# Patient Record
Sex: MALE | Race: WHITE | NOT HISPANIC OR LATINO | Employment: OTHER | URBAN - METROPOLITAN AREA
[De-identification: names, ages, dates, MRNs, and addresses within clinical notes are randomized per-mention and may not be internally consistent; named-entity substitution may affect disease eponyms.]

---

## 2018-01-15 NOTE — MISCELLANEOUS
Dear  Mr Salazarning Issac:      We missed you for your originally scheduled neurological followup appointment with Dr Saman Porras    Please call at your earliest convenience to reschedule this appointment  Sincerely,     Verenice Miranda      Extension  [phone]           Electronically signed Tim Nino   Feb 23 2016  3:02PM EST Author

## 2019-10-04 ENCOUNTER — TELEPHONE (OUTPATIENT)
Dept: UROLOGY | Facility: CLINIC | Age: 84
End: 2019-10-04

## 2019-10-04 NOTE — TELEPHONE ENCOUNTER
Received [facility] referral     Called pt and left voicemail for patient to call back and make appt - closest office is [place]  Referral is entered and scanned in [facility]      Records were faxed to [facility]

## 2021-01-01 ENCOUNTER — NURSING HOME VISIT (OUTPATIENT)
Dept: GERIATRICS | Facility: OTHER | Age: 86
End: 2021-01-01
Payer: MEDICARE

## 2021-01-01 VITALS
RESPIRATION RATE: 18 BRPM | SYSTOLIC BLOOD PRESSURE: 138 MMHG | HEART RATE: 67 BPM | WEIGHT: 162 LBS | DIASTOLIC BLOOD PRESSURE: 61 MMHG | OXYGEN SATURATION: 96 % | TEMPERATURE: 96.8 F | BODY MASS INDEX: 26.96 KG/M2

## 2021-01-01 VITALS
HEART RATE: 70 BPM | WEIGHT: 162 LBS | DIASTOLIC BLOOD PRESSURE: 55 MMHG | TEMPERATURE: 97.3 F | BODY MASS INDEX: 26.96 KG/M2 | OXYGEN SATURATION: 97 % | RESPIRATION RATE: 18 BRPM | SYSTOLIC BLOOD PRESSURE: 111 MMHG

## 2021-01-01 DIAGNOSIS — K86.9 PANCREATIC LESION: ICD-10-CM

## 2021-01-01 DIAGNOSIS — N18.32 STAGE 3B CHRONIC KIDNEY DISEASE (HCC): ICD-10-CM

## 2021-01-01 DIAGNOSIS — R26.2 AMBULATORY DYSFUNCTION: ICD-10-CM

## 2021-01-01 DIAGNOSIS — G70.00 MG (MYASTHENIA GRAVIS) (HCC): ICD-10-CM

## 2021-01-01 DIAGNOSIS — N40.0 BENIGN PROSTATIC HYPERPLASIA, UNSPECIFIED WHETHER LOWER URINARY TRACT SYMPTOMS PRESENT: ICD-10-CM

## 2021-01-01 DIAGNOSIS — G70.00 MG (MYASTHENIA GRAVIS) (HCC): Primary | ICD-10-CM

## 2021-01-01 DIAGNOSIS — K25.3 ACUTE ULCER OF PYLORIC ANTRUM: Primary | ICD-10-CM

## 2021-01-01 PROCEDURE — 99306 1ST NF CARE HIGH MDM 50: CPT | Performed by: FAMILY MEDICINE

## 2021-01-01 PROCEDURE — 99309 SBSQ NF CARE MODERATE MDM 30: CPT | Performed by: NURSE PRACTITIONER

## 2021-01-01 RX ORDER — PANTOPRAZOLE SODIUM 40 MG/1
40 TABLET, DELAYED RELEASE ORAL 2 TIMES DAILY
COMMUNITY
End: 2022-01-01 | Stop reason: HOSPADM

## 2021-01-01 RX ORDER — OXYBUTYNIN CHLORIDE 5 MG/1
1 TABLET, EXTENDED RELEASE ORAL DAILY
COMMUNITY
End: 2022-01-01 | Stop reason: ALTCHOICE

## 2021-01-01 RX ORDER — SUCRALFATE 1 G/1
1 TABLET ORAL 4 TIMES DAILY
COMMUNITY
End: 2022-01-01 | Stop reason: HOSPADM

## 2021-01-01 RX ORDER — GABAPENTIN 300 MG/1
300 CAPSULE ORAL
COMMUNITY
End: 2022-01-01 | Stop reason: HOSPADM

## 2021-01-01 RX ORDER — DOXEPIN HYDROCHLORIDE 10 MG/1
10 CAPSULE ORAL AS NEEDED
COMMUNITY
End: 2022-01-01 | Stop reason: ALTCHOICE

## 2021-01-01 RX ORDER — TERAZOSIN 10 MG/1
5 CAPSULE ORAL
COMMUNITY
End: 2022-01-01 | Stop reason: HOSPADM

## 2021-01-01 RX ORDER — FOLIC ACID 1 MG/1
TABLET ORAL DAILY
COMMUNITY
End: 2022-01-01 | Stop reason: ALTCHOICE

## 2021-01-01 RX ORDER — LOVASTATIN 40 MG/1
1 TABLET ORAL DAILY
COMMUNITY
End: 2022-01-01 | Stop reason: ALTCHOICE

## 2021-12-22 PROBLEM — G70.00 MG (MYASTHENIA GRAVIS) (HCC): Status: ACTIVE | Noted: 2021-01-01

## 2021-12-22 PROBLEM — K25.9 ANTRAL ULCER: Status: ACTIVE | Noted: 2021-01-01

## 2021-12-22 PROBLEM — R26.2 AMBULATORY DYSFUNCTION: Status: ACTIVE | Noted: 2021-01-01

## 2021-12-22 PROBLEM — H91.90 HEARING LOSS: Status: ACTIVE | Noted: 2021-01-01

## 2021-12-22 PROBLEM — N18.30 STAGE 3 CHRONIC KIDNEY DISEASE (HCC): Status: ACTIVE | Noted: 2021-01-01

## 2021-12-22 PROBLEM — R53.81 PHYSICAL DECONDITIONING: Status: ACTIVE | Noted: 2021-01-01

## 2021-12-22 PROBLEM — K86.9 PANCREATIC LESION: Status: ACTIVE | Noted: 2021-01-01

## 2021-12-30 NOTE — PROGRESS NOTES
[facility]  [address]ak[address]  [phone]  [place]  Code 31 (STR)    NAME: Carley Garcia  AGE: 81 y o  SEX: male CODE STATUS: DNR/DNI    DATE OF ENCOUNTER: 12/30/2021    Assessment and Plan     1  MG (myasthenia gravis) ([facility] [address] [address] )  Assessment & Plan:  · Follows with Neurology as OP  · Received IVIG cycles  · Continue PT/OT at [facility]  · Fall Precautions       2  Benign prostatic hyperplasia, unspecified whether lower urinary tract symptoms present  Assessment & Plan:  · Denies symptoms on exam   · Continue Oxybutynin and Terazosin      3  Stage 3b chronic kidney disease ([facility] [address] )  Assessment & Plan:  · JUDITH while inpatient - received gentle IVF  · BUN/Crt 42/1 67- stable  · Monitor with PCP at d/c from [facility]  · Avoid hypotension  · Avoid NSAIDS  · Renal dose medications       4  Ambulatory dysfunction  Assessment & Plan:  · Patient states he still feels weak  · Good Bed mobility- having difficulty with transfers and ambulation  · Continue PT/OT  · Fall Precautions  ·  following for d/c plan- potential LTC here vs Family to take home          All medications and routine orders were reviewed and updated as needed  Chief Complaint     STR follow up visit    History of Present Illness     Nette Xiao is a 81year old male admitted to [address] [place] [address] on 12/21/2021 for STR  Past Medical Hx including but not limited to Myastenia Gravis, BPH, Prostate CA, Neuropathy, ambulatory dysfunction seen in collaboration with nursing for medical mgmt and STR follow up  Presented to [facility] with c/o 2 week worsening weakness and loss of appetite  He developed coffee ground emesis- s/p EDG showing antral ulceration- started on PPI and carafate  Patient had JUDITH s/p gentle IVF with improvement  CT abdomen revealed a concerning pancreatic mass and an MRI was done to reveal 5 mm pancreatic cystic lesion- no change from prior imaging per records       Seen on exam today, he is sitting on edge of bed eating chocolate cake for breakfast  Patient states his appetite is returning  Patient denies any pain on exam  Patient is [place] but able to provide a reliable history  Patient states he still feels weak and is "thankful" for therapy but is requesting to go home  Patient denies any bowel or bladder issues  Patient denies CP/SOB/N/V/D  Denies lightheadedness, dizziness, headaches, vision changes  Per review of SNF records, Patient is eating 1-3 meals per day, consuming  %  Last documented BM 12/30/21 x 2  Patient is actively participating in therapy  No concerns from nursing at this time  The patient's allergies, past medical, surgical, social and family history were reviewed and unchanged  Review of Systems     Review of Systems   Constitutional: Positive for activity change  Negative for appetite change, fatigue and fever  HENT: Positive for hearing loss  Negative for ear pain, rhinorrhea and trouble swallowing  Eyes: Negative for pain and visual disturbance  Respiratory: Negative for cough, shortness of breath and wheezing  Cardiovascular: Negative for chest pain and palpitations  Gastrointestinal: Negative for abdominal distention, abdominal pain, blood in stool, constipation, diarrhea, nausea and vomiting  Genitourinary: Negative for decreased urine volume, difficulty urinating, dysuria, frequency and hematuria  Musculoskeletal: Positive for gait problem  Negative for arthralgias and back pain  Skin: Negative for color change and rash  Neurological: Positive for weakness  Negative for dizziness, syncope, light-headedness, numbness and headaches  Psychiatric/Behavioral: Negative for dysphoric mood and sleep disturbance  Objective     Vitals:   Vitals:    12/30/21 0523   BP: 138/61   Pulse: 67   Resp: 18   Temp: (!) 96 8 °F (36 °C)   SpO2: 96%         Physical Exam  Vitals and nursing note reviewed     Constitutional:       General: He is not in acute distress  Appearance: Normal appearance  HENT:      Head: Normocephalic and atraumatic  Ears:      Comments: Hard of Hearing      Nose: No congestion or rhinorrhea  Mouth/Throat:      Mouth: Mucous membranes are moist    Eyes:      General: No scleral icterus  Extraocular Movements: Extraocular movements intact  Conjunctiva/sclera: Conjunctivae normal       Pupils: Pupils are equal, round, and reactive to light  Cardiovascular:      Rate and Rhythm: Normal rate and regular rhythm  Pulses: Normal pulses  Heart sounds: Normal heart sounds  No murmur heard  Pulmonary:      Effort: Pulmonary effort is normal       Breath sounds: Normal breath sounds  No wheezing, rhonchi or rales  Abdominal:      General: Bowel sounds are normal  There is no distension  Palpations: Abdomen is soft  Tenderness: There is no abdominal tenderness  Musculoskeletal:         General: No swelling or signs of injury  Lymphadenopathy:      Cervical: No cervical adenopathy  Skin:     General: Skin is warm and dry  Findings: No erythema  Neurological:      Mental Status: He is alert and oriented to person, place, and time  Mental status is at baseline  Sensory: No sensory deficit  Motor: Weakness present  Gait: Gait abnormal       Comments: AAOx3 - forgetful about names of medications, "foggy" on details of recent events    Psychiatric:         Mood and Affect: Mood normal          Behavior: Behavior normal          Pertinent Laboratory/Diagnostic Studies:   Reviewed in facility chart-stable    Current Medications   Medications reviewed and updated see facility [facility] for details        Current Outpatient Medications:     Aspirin 81 MG CAPS, Take by mouth, Disp: , Rfl:     doxepin (SINEquan) 10 mg capsule, Take 10 mg by mouth as needed for insomnia, Disp: , Rfl:     folic acid (FOLVITE) 1 mg tablet, Take by mouth daily, Disp: , Rfl:     gabapentin (NEURONTIN) 300 mg capsule, Take 300 mg by mouth daily at bedtime, Disp: , Rfl:     lovastatin (MEVACOR) 40 MG tablet, Take 1 tablet by mouth daily, Disp: , Rfl:     oxybutynin (DITROPAN-XL) 5 mg 24 hr tablet, Take 1 tablet by mouth daily, Disp: , Rfl:     pantoprazole (PROTONIX) 40 mg tablet, Take 40 mg by mouth 2 (two) times a day, Disp: , Rfl:     sucralfate (CARAFATE) 1 g tablet, Take 1 g by mouth 4 (four) times a day, Disp: , Rfl:     terazosin (HYTRIN) 10 MG capsule, Take 10 mg by mouth daily at bedtime, Disp: , Rfl:      Plan discussed with Dr Analisa Lopez noted agreement with assessment and plan  Please note:  Voice-recognition software may have been used in the preparation of this document  Occasional wrong word or "sound-alike" substitutions may have occurred due to the inherent limitations of voice recognition software  Interpretation should be guided by JOSE Gamboa  12/30/2021  5:22 AM

## 2022-01-01 ENCOUNTER — NURSING HOME VISIT (OUTPATIENT)
Dept: GERIATRICS | Facility: OTHER | Age: 87
End: 2022-01-01
Payer: COMMERCIAL

## 2022-01-01 ENCOUNTER — TELEPHONE (OUTPATIENT)
Dept: SURGICAL ONCOLOGY | Facility: CLINIC | Age: 87
End: 2022-01-01

## 2022-01-01 ENCOUNTER — DOCUMENTATION (OUTPATIENT)
Dept: OTHER | Facility: HOSPITAL | Age: 87
End: 2022-01-01

## 2022-01-01 ENCOUNTER — TELEPHONE (OUTPATIENT)
Dept: NEUROLOGY | Facility: CLINIC | Age: 87
End: 2022-01-01

## 2022-01-01 ENCOUNTER — HOSPITAL ENCOUNTER (INPATIENT)
Facility: HOSPITAL | Age: 87
LOS: 8 days | Discharge: HOSPICE | DRG: 871 | End: 2022-03-16
Attending: EMERGENCY MEDICINE | Admitting: INTERNAL MEDICINE
Payer: COMMERCIAL

## 2022-01-01 ENCOUNTER — APPOINTMENT (EMERGENCY)
Dept: CT IMAGING | Facility: HOSPITAL | Age: 87
DRG: 871 | End: 2022-01-01
Payer: COMMERCIAL

## 2022-01-01 ENCOUNTER — TELEPHONE (OUTPATIENT)
Dept: OTHER | Facility: OTHER | Age: 87
End: 2022-01-01

## 2022-01-01 ENCOUNTER — OFFICE VISIT (OUTPATIENT)
Dept: NEUROLOGY | Facility: CLINIC | Age: 87
End: 2022-01-01
Payer: COMMERCIAL

## 2022-01-01 ENCOUNTER — TELEPHONE (OUTPATIENT)
Dept: HEMATOLOGY ONCOLOGY | Facility: CLINIC | Age: 87
End: 2022-01-01

## 2022-01-01 ENCOUNTER — NURSING HOME VISIT (OUTPATIENT)
Dept: WOUND CARE | Facility: HOSPITAL | Age: 87
End: 2022-01-01
Payer: COMMERCIAL

## 2022-01-01 VITALS — BODY MASS INDEX: 25.68 KG/M2 | WEIGHT: 154.3 LBS

## 2022-01-01 VITALS
OXYGEN SATURATION: 93 % | RESPIRATION RATE: 30 BRPM | HEIGHT: 65 IN | HEART RATE: 74 BPM | BODY MASS INDEX: 28.06 KG/M2 | TEMPERATURE: 99.1 F | WEIGHT: 168.43 LBS | SYSTOLIC BLOOD PRESSURE: 129 MMHG | DIASTOLIC BLOOD PRESSURE: 59 MMHG

## 2022-01-01 VITALS
HEART RATE: 74 BPM | DIASTOLIC BLOOD PRESSURE: 72 MMHG | TEMPERATURE: 95.9 F | OXYGEN SATURATION: 96 % | RESPIRATION RATE: 16 BRPM | SYSTOLIC BLOOD PRESSURE: 142 MMHG

## 2022-01-01 VITALS
TEMPERATURE: 98.2 F | BODY MASS INDEX: 25.68 KG/M2 | SYSTOLIC BLOOD PRESSURE: 94 MMHG | HEART RATE: 57 BPM | RESPIRATION RATE: 20 BRPM | OXYGEN SATURATION: 96 % | WEIGHT: 154.3 LBS | DIASTOLIC BLOOD PRESSURE: 55 MMHG

## 2022-01-01 DIAGNOSIS — J41.1 MUCOPURULENT CHRONIC BRONCHITIS (HCC): ICD-10-CM

## 2022-01-01 DIAGNOSIS — N40.0 BENIGN PROSTATIC HYPERPLASIA, UNSPECIFIED WHETHER LOWER URINARY TRACT SYMPTOMS PRESENT: ICD-10-CM

## 2022-01-01 DIAGNOSIS — G93.40 ENCEPHALOPATHY: Primary | ICD-10-CM

## 2022-01-01 DIAGNOSIS — G70.00 MG (MYASTHENIA GRAVIS) (HCC): ICD-10-CM

## 2022-01-01 DIAGNOSIS — G70.00 MG (MYASTHENIA GRAVIS) (HCC): Primary | ICD-10-CM

## 2022-01-01 DIAGNOSIS — T80.1XXA INTRAVENOUS INFILTRATION, INITIAL ENCOUNTER: Primary | ICD-10-CM

## 2022-01-01 DIAGNOSIS — N40.0 BENIGN PROSTATIC HYPERPLASIA, UNSPECIFIED WHETHER LOWER URINARY TRACT SYMPTOMS PRESENT: Primary | ICD-10-CM

## 2022-01-01 DIAGNOSIS — C61 MALIGNANT NEOPLASM OF PROSTATE (HCC): ICD-10-CM

## 2022-01-01 DIAGNOSIS — R54 FRAILTY SYNDROME IN GERIATRIC PATIENT: ICD-10-CM

## 2022-01-01 DIAGNOSIS — N18.32 STAGE 3B CHRONIC KIDNEY DISEASE (HCC): ICD-10-CM

## 2022-01-01 DIAGNOSIS — K86.9 PANCREATIC LESION: ICD-10-CM

## 2022-01-01 DIAGNOSIS — R26.2 AMBULATORY DYSFUNCTION: ICD-10-CM

## 2022-01-01 DIAGNOSIS — K25.3 ACUTE ULCER OF PYLORIC ANTRUM: Primary | ICD-10-CM

## 2022-01-01 DIAGNOSIS — E16.2 HYPOGLYCEMIA: ICD-10-CM

## 2022-01-01 LAB
2HR DELTA HS TROPONIN: -2 NG/L
4HR DELTA HS TROPONIN: 2 NG/L
ABO GROUP BLD: NORMAL
ACETOHEXAMIDE SERPL-MCNC: NEGATIVE UG/ML (ref 20–60)
ACHR BIND AB SER-SCNC: 7.21 NMOL/L (ref 0–0.24)
ACHR BLOCK AB/ACHR TOTAL SFR SER: 46 % (ref 0–25)
ALBUMIN SERPL BCP-MCNC: 1.4 G/DL (ref 3.5–5)
ALBUMIN SERPL BCP-MCNC: 1.6 G/DL (ref 3.5–5)
ALBUMIN SERPL BCP-MCNC: 2 G/DL (ref 3.5–5)
ALBUMIN SERPL BCP-MCNC: 2.6 G/DL (ref 3.5–5)
ALP SERPL-CCNC: 114 U/L (ref 46–116)
ALP SERPL-CCNC: 116 U/L (ref 46–116)
ALP SERPL-CCNC: 117 U/L (ref 46–116)
ALP SERPL-CCNC: 95 U/L (ref 46–116)
ALT SERPL W P-5'-P-CCNC: 49 U/L (ref 12–78)
ALT SERPL W P-5'-P-CCNC: 65 U/L (ref 12–78)
ALT SERPL W P-5'-P-CCNC: 69 U/L (ref 12–78)
ALT SERPL W P-5'-P-CCNC: 70 U/L (ref 12–78)
AMMONIA PLAS-SCNC: 16 UMOL/L (ref 11–35)
AMPHETAMINES SERPL QL SCN: NEGATIVE
ANION GAP SERPL CALCULATED.3IONS-SCNC: 10 MMOL/L (ref 4–13)
ANION GAP SERPL CALCULATED.3IONS-SCNC: 10 MMOL/L (ref 4–13)
ANION GAP SERPL CALCULATED.3IONS-SCNC: 11 MMOL/L (ref 4–13)
ANION GAP SERPL CALCULATED.3IONS-SCNC: 11 MMOL/L (ref 4–13)
ANION GAP SERPL CALCULATED.3IONS-SCNC: 12 MMOL/L (ref 4–13)
ANION GAP SERPL CALCULATED.3IONS-SCNC: 12 MMOL/L (ref 4–13)
ANION GAP SERPL CALCULATED.3IONS-SCNC: 14 MMOL/L (ref 4–13)
ANION GAP SERPL CALCULATED.3IONS-SCNC: 14 MMOL/L (ref 4–13)
ANION GAP SERPL CALCULATED.3IONS-SCNC: 9 MMOL/L (ref 4–13)
APAP SERPL-MCNC: <2 UG/ML (ref 10–20)
APTT PPP: 55 SECONDS (ref 23–37)
AST SERPL W P-5'-P-CCNC: 42 U/L (ref 5–45)
AST SERPL W P-5'-P-CCNC: 55 U/L (ref 5–45)
AST SERPL W P-5'-P-CCNC: 62 U/L (ref 5–45)
AST SERPL W P-5'-P-CCNC: 63 U/L (ref 5–45)
ATRIAL RATE: 0 BPM
ATRIAL RATE: 150 BPM
ATRIAL RATE: 441 BPM
ATRIAL RATE: 80 BPM
ATRIAL RATE: 86 BPM
ATRIAL RATE: 99 BPM
BACTERIA BLD CULT: NORMAL
BACTERIA BLD CULT: NORMAL
BACTERIA SPT RESP CULT: ABNORMAL
BACTERIA SPT RESP CULT: ABNORMAL
BACTERIA UR QL AUTO: ABNORMAL /HPF
BARBITURATES UR QL: NEGATIVE
BASOPHILS # BLD AUTO: 0.01 THOUSANDS/ΜL (ref 0–0.1)
BASOPHILS # BLD AUTO: 0.02 THOUSANDS/ΜL (ref 0–0.1)
BASOPHILS NFR BLD AUTO: 0 % (ref 0–1)
BENZODIAZ UR QL: NEGATIVE
BETA-HYDROXYBUTYRATE: 0 MMOL/L
BILIRUB SERPL-MCNC: 0.28 MG/DL (ref 0.2–1)
BILIRUB SERPL-MCNC: 0.31 MG/DL (ref 0.2–1)
BILIRUB SERPL-MCNC: 0.55 MG/DL (ref 0.2–1)
BILIRUB SERPL-MCNC: 0.67 MG/DL (ref 0.2–1)
BILIRUB UR QL STRIP: NEGATIVE
BLD GP AB SCN SERPL QL: NEGATIVE
BUN SERPL-MCNC: 23 MG/DL (ref 5–25)
BUN SERPL-MCNC: 24 MG/DL (ref 5–25)
BUN SERPL-MCNC: 24 MG/DL (ref 5–25)
BUN SERPL-MCNC: 25 MG/DL (ref 5–25)
BUN SERPL-MCNC: 26 MG/DL (ref 5–25)
BUN SERPL-MCNC: 30 MG/DL (ref 5–25)
BUN SERPL-MCNC: 30 MG/DL (ref 5–25)
BUN SERPL-MCNC: 36 MG/DL (ref 5–25)
BUN SERPL-MCNC: 51 MG/DL (ref 5–25)
C PEPTIDE SERPL-MCNC: 3.8 NG/ML (ref 1.1–4.4)
CA-I BLD-SCNC: 1.22 MMOL/L (ref 1.12–1.32)
CALCIUM ALBUM COR SERPL-MCNC: 10.1 MG/DL (ref 8.3–10.1)
CALCIUM ALBUM COR SERPL-MCNC: 10.3 MG/DL (ref 8.3–10.1)
CALCIUM ALBUM COR SERPL-MCNC: 11.1 MG/DL (ref 8.3–10.1)
CALCIUM ALBUM COR SERPL-MCNC: 11.4 MG/DL (ref 8.3–10.1)
CALCIUM SERPL-MCNC: 8.2 MG/DL (ref 8.3–10.1)
CALCIUM SERPL-MCNC: 8.3 MG/DL (ref 8.3–10.1)
CALCIUM SERPL-MCNC: 8.3 MG/DL (ref 8.3–10.1)
CALCIUM SERPL-MCNC: 8.5 MG/DL (ref 8.3–10.1)
CALCIUM SERPL-MCNC: 8.8 MG/DL (ref 8.3–10.1)
CALCIUM SERPL-MCNC: 8.9 MG/DL (ref 8.3–10.1)
CALCIUM SERPL-MCNC: 9.2 MG/DL (ref 8.3–10.1)
CALCIUM SERPL-MCNC: 9.2 MG/DL (ref 8.3–10.1)
CALCIUM SERPL-MCNC: 9.3 MG/DL (ref 8.3–10.1)
CARDIAC TROPONIN I PNL SERPL HS: 15 NG/L
CARDIAC TROPONIN I PNL SERPL HS: 17 NG/L
CARDIAC TROPONIN I PNL SERPL HS: 19 NG/L
CHLORIDE SERPL-SCNC: 106 MMOL/L (ref 100–108)
CHLORIDE SERPL-SCNC: 107 MMOL/L (ref 100–108)
CHLORIDE SERPL-SCNC: 112 MMOL/L (ref 100–108)
CHLORIDE SERPL-SCNC: 114 MMOL/L (ref 100–108)
CHLORIDE SERPL-SCNC: 115 MMOL/L (ref 100–108)
CHLORIDE SERPL-SCNC: 115 MMOL/L (ref 100–108)
CHLORIDE SERPL-SCNC: 117 MMOL/L (ref 100–108)
CHLORPROPAMIDE SERPL-MCNC: NEGATIVE UG/ML (ref 75–250)
CLARITY UR: CLEAR
CO2 SERPL-SCNC: 16 MMOL/L (ref 21–32)
CO2 SERPL-SCNC: 16 MMOL/L (ref 21–32)
CO2 SERPL-SCNC: 17 MMOL/L (ref 21–32)
CO2 SERPL-SCNC: 17 MMOL/L (ref 21–32)
CO2 SERPL-SCNC: 18 MMOL/L (ref 21–32)
CO2 SERPL-SCNC: 19 MMOL/L (ref 21–32)
CO2 SERPL-SCNC: 20 MMOL/L (ref 21–32)
CO2 SERPL-SCNC: 20 MMOL/L (ref 21–32)
CO2 SERPL-SCNC: 23 MMOL/L (ref 21–32)
COCAINE UR QL: NEGATIVE
COLOR UR: YELLOW
CORTIS SERPL-MCNC: 21.8 UG/DL
CORTIS SERPL-MCNC: 23.5 UG/DL
CREAT SERPL-MCNC: 1.66 MG/DL (ref 0.6–1.3)
CREAT SERPL-MCNC: 1.7 MG/DL (ref 0.6–1.3)
CREAT SERPL-MCNC: 1.73 MG/DL (ref 0.6–1.3)
CREAT SERPL-MCNC: 1.78 MG/DL (ref 0.6–1.3)
CREAT SERPL-MCNC: 1.78 MG/DL (ref 0.6–1.3)
CREAT SERPL-MCNC: 1.99 MG/DL (ref 0.6–1.3)
CREAT SERPL-MCNC: 2.18 MG/DL (ref 0.6–1.3)
CREAT SERPL-MCNC: 2.18 MG/DL (ref 0.6–1.3)
CREAT SERPL-MCNC: 2.23 MG/DL (ref 0.6–1.3)
EOSINOPHIL # BLD AUTO: 0.02 THOUSAND/ΜL (ref 0–0.61)
EOSINOPHIL # BLD AUTO: 0.06 THOUSAND/ΜL (ref 0–0.61)
EOSINOPHIL # BLD AUTO: 0.1 THOUSAND/ΜL (ref 0–0.61)
EOSINOPHIL # BLD AUTO: 0.14 THOUSAND/ΜL (ref 0–0.61)
EOSINOPHIL # BLD AUTO: 0.18 THOUSAND/ΜL (ref 0–0.61)
EOSINOPHIL NFR BLD AUTO: 0 % (ref 0–6)
EOSINOPHIL NFR BLD AUTO: 1 % (ref 0–6)
EOSINOPHIL NFR BLD AUTO: 1 % (ref 0–6)
EOSINOPHIL NFR BLD AUTO: 2 % (ref 0–6)
EOSINOPHIL NFR BLD AUTO: 3 % (ref 0–6)
ERYTHROCYTE [DISTWIDTH] IN BLOOD BY AUTOMATED COUNT: 14.8 % (ref 11.6–15.1)
ERYTHROCYTE [DISTWIDTH] IN BLOOD BY AUTOMATED COUNT: 15.2 % (ref 11.6–15.1)
ERYTHROCYTE [DISTWIDTH] IN BLOOD BY AUTOMATED COUNT: 15.2 % (ref 11.6–15.1)
ERYTHROCYTE [DISTWIDTH] IN BLOOD BY AUTOMATED COUNT: 15.3 % (ref 11.6–15.1)
ERYTHROCYTE [DISTWIDTH] IN BLOOD BY AUTOMATED COUNT: 15.4 % (ref 11.6–15.1)
ERYTHROCYTE [DISTWIDTH] IN BLOOD BY AUTOMATED COUNT: 15.4 % (ref 11.6–15.1)
ERYTHROCYTE [DISTWIDTH] IN BLOOD BY AUTOMATED COUNT: 15.9 % (ref 11.6–15.1)
EST. AVERAGE GLUCOSE BLD GHB EST-MCNC: 85 MG/DL
ETHANOL SERPL-MCNC: <3 MG/DL (ref 0–3)
FLUAV RNA RESP QL NAA+PROBE: NEGATIVE
FLUBV RNA RESP QL NAA+PROBE: NEGATIVE
GFR SERPL CREATININE-BSD FRML MDRD: 25 ML/MIN/1.73SQ M
GFR SERPL CREATININE-BSD FRML MDRD: 26 ML/MIN/1.73SQ M
GFR SERPL CREATININE-BSD FRML MDRD: 26 ML/MIN/1.73SQ M
GFR SERPL CREATININE-BSD FRML MDRD: 29 ML/MIN/1.73SQ M
GFR SERPL CREATININE-BSD FRML MDRD: 34 ML/MIN/1.73SQ M
GFR SERPL CREATININE-BSD FRML MDRD: 34 ML/MIN/1.73SQ M
GFR SERPL CREATININE-BSD FRML MDRD: 35 ML/MIN/1.73SQ M
GFR SERPL CREATININE-BSD FRML MDRD: 35 ML/MIN/1.73SQ M
GFR SERPL CREATININE-BSD FRML MDRD: 37 ML/MIN/1.73SQ M
GLIMEPIRIDE SERPL-MCNC: NEGATIVE NG/ML (ref 80–250)
GLIPIZIDE SERPL-MCNC: NEGATIVE NG/ML (ref 200–1000)
GLUCOSE SERPL-MCNC: 100 MG/DL (ref 65–140)
GLUCOSE SERPL-MCNC: 101 MG/DL (ref 65–140)
GLUCOSE SERPL-MCNC: 101 MG/DL (ref 65–140)
GLUCOSE SERPL-MCNC: 104 MG/DL (ref 65–140)
GLUCOSE SERPL-MCNC: 105 MG/DL (ref 65–140)
GLUCOSE SERPL-MCNC: 105 MG/DL (ref 65–140)
GLUCOSE SERPL-MCNC: 106 MG/DL (ref 65–140)
GLUCOSE SERPL-MCNC: 107 MG/DL (ref 65–140)
GLUCOSE SERPL-MCNC: 108 MG/DL (ref 65–140)
GLUCOSE SERPL-MCNC: 110 MG/DL (ref 65–140)
GLUCOSE SERPL-MCNC: 112 MG/DL (ref 65–140)
GLUCOSE SERPL-MCNC: 113 MG/DL (ref 65–140)
GLUCOSE SERPL-MCNC: 114 MG/DL (ref 65–140)
GLUCOSE SERPL-MCNC: 114 MG/DL (ref 65–140)
GLUCOSE SERPL-MCNC: 115 MG/DL (ref 65–140)
GLUCOSE SERPL-MCNC: 117 MG/DL (ref 65–140)
GLUCOSE SERPL-MCNC: 118 MG/DL (ref 65–140)
GLUCOSE SERPL-MCNC: 119 MG/DL (ref 65–140)
GLUCOSE SERPL-MCNC: 120 MG/DL (ref 65–140)
GLUCOSE SERPL-MCNC: 121 MG/DL (ref 65–140)
GLUCOSE SERPL-MCNC: 121 MG/DL (ref 65–140)
GLUCOSE SERPL-MCNC: 123 MG/DL (ref 65–140)
GLUCOSE SERPL-MCNC: 124 MG/DL (ref 65–140)
GLUCOSE SERPL-MCNC: 124 MG/DL (ref 65–140)
GLUCOSE SERPL-MCNC: 125 MG/DL (ref 65–140)
GLUCOSE SERPL-MCNC: 126 MG/DL (ref 65–140)
GLUCOSE SERPL-MCNC: 127 MG/DL (ref 65–140)
GLUCOSE SERPL-MCNC: 134 MG/DL (ref 65–140)
GLUCOSE SERPL-MCNC: 136 MG/DL (ref 65–140)
GLUCOSE SERPL-MCNC: 136 MG/DL (ref 65–140)
GLUCOSE SERPL-MCNC: 139 MG/DL (ref 65–140)
GLUCOSE SERPL-MCNC: 141 MG/DL (ref 65–140)
GLUCOSE SERPL-MCNC: 144 MG/DL (ref 65–140)
GLUCOSE SERPL-MCNC: 145 MG/DL (ref 65–140)
GLUCOSE SERPL-MCNC: 146 MG/DL (ref 65–140)
GLUCOSE SERPL-MCNC: 147 MG/DL (ref 65–140)
GLUCOSE SERPL-MCNC: 148 MG/DL (ref 65–140)
GLUCOSE SERPL-MCNC: 148 MG/DL (ref 65–140)
GLUCOSE SERPL-MCNC: 150 MG/DL (ref 65–140)
GLUCOSE SERPL-MCNC: 154 MG/DL (ref 65–140)
GLUCOSE SERPL-MCNC: 159 MG/DL (ref 65–140)
GLUCOSE SERPL-MCNC: 161 MG/DL (ref 65–140)
GLUCOSE SERPL-MCNC: 163 MG/DL (ref 65–140)
GLUCOSE SERPL-MCNC: 165 MG/DL (ref 65–140)
GLUCOSE SERPL-MCNC: 166 MG/DL (ref 65–140)
GLUCOSE SERPL-MCNC: 169 MG/DL (ref 65–140)
GLUCOSE SERPL-MCNC: 187 MG/DL (ref 65–140)
GLUCOSE SERPL-MCNC: 211 MG/DL (ref 65–140)
GLUCOSE SERPL-MCNC: 22 MG/DL (ref 65–140)
GLUCOSE SERPL-MCNC: 295 MG/DL (ref 65–140)
GLUCOSE SERPL-MCNC: 30 MG/DL (ref 65–140)
GLUCOSE SERPL-MCNC: 354 MG/DL (ref 65–140)
GLUCOSE SERPL-MCNC: 37 MG/DL (ref 65–140)
GLUCOSE SERPL-MCNC: 53 MG/DL (ref 65–140)
GLUCOSE SERPL-MCNC: 56 MG/DL (ref 65–140)
GLUCOSE SERPL-MCNC: 59 MG/DL (ref 65–140)
GLUCOSE SERPL-MCNC: 72 MG/DL (ref 65–140)
GLUCOSE SERPL-MCNC: 73 MG/DL (ref 65–140)
GLUCOSE SERPL-MCNC: 74 MG/DL (ref 65–140)
GLUCOSE SERPL-MCNC: 77 MG/DL (ref 65–140)
GLUCOSE SERPL-MCNC: 78 MG/DL (ref 65–140)
GLUCOSE SERPL-MCNC: 80 MG/DL (ref 65–140)
GLUCOSE SERPL-MCNC: 81 MG/DL (ref 65–140)
GLUCOSE SERPL-MCNC: 82 MG/DL (ref 65–140)
GLUCOSE SERPL-MCNC: 83 MG/DL (ref 65–140)
GLUCOSE SERPL-MCNC: 84 MG/DL (ref 65–140)
GLUCOSE SERPL-MCNC: 85 MG/DL (ref 65–140)
GLUCOSE SERPL-MCNC: 88 MG/DL (ref 65–140)
GLUCOSE SERPL-MCNC: 89 MG/DL (ref 65–140)
GLUCOSE SERPL-MCNC: 90 MG/DL (ref 65–140)
GLUCOSE SERPL-MCNC: 91 MG/DL (ref 65–140)
GLUCOSE SERPL-MCNC: 92 MG/DL (ref 65–140)
GLUCOSE SERPL-MCNC: 94 MG/DL (ref 65–140)
GLUCOSE SERPL-MCNC: 94 MG/DL (ref 65–140)
GLUCOSE SERPL-MCNC: 97 MG/DL (ref 65–140)
GLUCOSE SERPL-MCNC: 98 MG/DL (ref 65–140)
GLUCOSE SERPL-MCNC: 98 MG/DL (ref 65–140)
GLUCOSE SERPL-MCNC: 99 MG/DL (ref 65–140)
GLUCOSE UR STRIP-MCNC: NEGATIVE MG/DL
GLYBURIDE SERPL-MCNC: NEGATIVE NG/ML
GRAM STN SPEC: ABNORMAL
HBA1C MFR BLD: 4.6 %
HCT VFR BLD AUTO: 18.3 % (ref 36.5–49.3)
HCT VFR BLD AUTO: 18.7 % (ref 36.5–49.3)
HCT VFR BLD AUTO: 19.9 % (ref 36.5–49.3)
HCT VFR BLD AUTO: 20.3 % (ref 36.5–49.3)
HCT VFR BLD AUTO: 23.2 % (ref 36.5–49.3)
HCT VFR BLD AUTO: 25 % (ref 36.5–49.3)
HCT VFR BLD AUTO: 28.6 % (ref 36.5–49.3)
HGB BLD-MCNC: 10.3 G/DL (ref 12–17)
HGB BLD-MCNC: 6.7 G/DL (ref 12–17)
HGB BLD-MCNC: 6.7 G/DL (ref 12–17)
HGB BLD-MCNC: 7 G/DL (ref 12–17)
HGB BLD-MCNC: 7.4 G/DL (ref 12–17)
HGB BLD-MCNC: 7.6 G/DL (ref 12–17)
HGB BLD-MCNC: 7.7 G/DL (ref 12–17)
HGB BLD-MCNC: 8.5 G/DL (ref 12–17)
HGB BLD-MCNC: 8.9 G/DL (ref 12–17)
HGB UR QL STRIP.AUTO: ABNORMAL
HYALINE CASTS #/AREA URNS LPF: ABNORMAL /LPF
IMM GRANULOCYTES # BLD AUTO: 0.03 THOUSAND/UL (ref 0–0.2)
IMM GRANULOCYTES # BLD AUTO: 0.03 THOUSAND/UL (ref 0–0.2)
IMM GRANULOCYTES # BLD AUTO: 0.04 THOUSAND/UL (ref 0–0.2)
IMM GRANULOCYTES # BLD AUTO: 0.05 THOUSAND/UL (ref 0–0.2)
IMM GRANULOCYTES # BLD AUTO: 0.09 THOUSAND/UL (ref 0–0.2)
IMM GRANULOCYTES NFR BLD AUTO: 0 % (ref 0–2)
IMM GRANULOCYTES NFR BLD AUTO: 1 % (ref 0–2)
IMM GRANULOCYTES NFR BLD AUTO: 2 % (ref 0–2)
INR PPP: 1.14 (ref 0.84–1.19)
INR PPP: 1.28 (ref 0.84–1.19)
INSULIN AB SER-ACNC: 8.1 UU/ML
KETONES UR STRIP-MCNC: ABNORMAL MG/DL
LACTATE SERPL-SCNC: 2 MMOL/L (ref 0.5–2)
LACTATE SERPL-SCNC: 2.3 MMOL/L (ref 0.5–2)
LACTATE SERPL-SCNC: 2.3 MMOL/L (ref 0.5–2)
LACTATE SERPL-SCNC: 2.4 MMOL/L (ref 0.5–2)
LACTATE SERPL-SCNC: 2.5 MMOL/L (ref 0.5–2)
LACTATE SERPL-SCNC: 3.6 MMOL/L (ref 0.5–2)
LEUKOCYTE ESTERASE UR QL STRIP: NEGATIVE
LYMPHOCYTES # BLD AUTO: 0.51 THOUSANDS/ΜL (ref 0.6–4.47)
LYMPHOCYTES # BLD AUTO: 0.55 THOUSANDS/ΜL (ref 0.6–4.47)
LYMPHOCYTES # BLD AUTO: 0.65 THOUSANDS/ΜL (ref 0.6–4.47)
LYMPHOCYTES # BLD AUTO: 0.73 THOUSANDS/ΜL (ref 0.6–4.47)
LYMPHOCYTES # BLD AUTO: 0.87 THOUSANDS/ΜL (ref 0.6–4.47)
LYMPHOCYTES NFR BLD AUTO: 12 % (ref 14–44)
LYMPHOCYTES NFR BLD AUTO: 13 % (ref 14–44)
LYMPHOCYTES NFR BLD AUTO: 6 % (ref 14–44)
LYMPHOCYTES NFR BLD AUTO: 9 % (ref 14–44)
LYMPHOCYTES NFR BLD AUTO: 9 % (ref 14–44)
MAGNESIUM SERPL-MCNC: 1.8 MG/DL (ref 1.6–2.6)
MAGNESIUM SERPL-MCNC: 1.9 MG/DL (ref 1.6–2.6)
MAGNESIUM SERPL-MCNC: 1.9 MG/DL (ref 1.6–2.6)
MCH RBC QN AUTO: 33.7 PG (ref 26.8–34.3)
MCH RBC QN AUTO: 33.9 PG (ref 26.8–34.3)
MCH RBC QN AUTO: 34.5 PG (ref 26.8–34.3)
MCH RBC QN AUTO: 34.7 PG (ref 26.8–34.3)
MCH RBC QN AUTO: 34.7 PG (ref 26.8–34.3)
MCH RBC QN AUTO: 35.1 PG (ref 26.8–34.3)
MCH RBC QN AUTO: 36 PG (ref 26.8–34.3)
MCHC RBC AUTO-ENTMCNC: 35.6 G/DL (ref 31.4–37.4)
MCHC RBC AUTO-ENTMCNC: 36 G/DL (ref 31.4–37.4)
MCHC RBC AUTO-ENTMCNC: 36.6 G/DL (ref 31.4–37.4)
MCHC RBC AUTO-ENTMCNC: 36.6 G/DL (ref 31.4–37.4)
MCHC RBC AUTO-ENTMCNC: 37.2 G/DL (ref 31.4–37.4)
MCHC RBC AUTO-ENTMCNC: 37.4 G/DL (ref 31.4–37.4)
MCHC RBC AUTO-ENTMCNC: 37.4 G/DL (ref 31.4–37.4)
MCV RBC AUTO: 92 FL (ref 82–98)
MCV RBC AUTO: 94 FL (ref 82–98)
MCV RBC AUTO: 94 FL (ref 82–98)
MCV RBC AUTO: 95 FL (ref 82–98)
MCV RBC AUTO: 96 FL (ref 82–98)
METHADONE UR QL: NEGATIVE
MISCELLANEOUS LAB TEST RESULT: NORMAL
MONOCYTES # BLD AUTO: 0.32 THOUSAND/ΜL (ref 0.17–1.22)
MONOCYTES # BLD AUTO: 0.4 THOUSAND/ΜL (ref 0.17–1.22)
MONOCYTES # BLD AUTO: 0.57 THOUSAND/ΜL (ref 0.17–1.22)
MONOCYTES # BLD AUTO: 0.6 THOUSAND/ΜL (ref 0.17–1.22)
MONOCYTES # BLD AUTO: 0.66 THOUSAND/ΜL (ref 0.17–1.22)
MONOCYTES NFR BLD AUTO: 11 % (ref 4–12)
MONOCYTES NFR BLD AUTO: 5 % (ref 4–12)
MONOCYTES NFR BLD AUTO: 7 % (ref 4–12)
MUSK AB SER IA-ACNC: <1 U/ML
NATEGLINIDE SERPL-MCNC: NEGATIVE NG/ML
NEUTROPHILS # BLD AUTO: 3.92 THOUSANDS/ΜL (ref 1.85–7.62)
NEUTROPHILS # BLD AUTO: 4.48 THOUSANDS/ΜL (ref 1.85–7.62)
NEUTROPHILS # BLD AUTO: 5.24 THOUSANDS/ΜL (ref 1.85–7.62)
NEUTROPHILS # BLD AUTO: 7.38 THOUSANDS/ΜL (ref 1.85–7.62)
NEUTROPHILS # BLD AUTO: 8.15 THOUSANDS/ΜL (ref 1.85–7.62)
NEUTS SEG NFR BLD AUTO: 72 % (ref 43–75)
NEUTS SEG NFR BLD AUTO: 77 % (ref 43–75)
NEUTS SEG NFR BLD AUTO: 83 % (ref 43–75)
NEUTS SEG NFR BLD AUTO: 85 % (ref 43–75)
NEUTS SEG NFR BLD AUTO: 85 % (ref 43–75)
NITRITE UR QL STRIP: NEGATIVE
NON-SQ EPI CELLS URNS QL MICRO: ABNORMAL /HPF
NRBC BLD AUTO-RTO: 0 /100 WBCS
OPIATES UR QL SCN: NEGATIVE
OXYCODONE+OXYMORPHONE UR QL SCN: NEGATIVE
P AXIS: 58 DEGREES
P AXIS: 68 DEGREES
P AXIS: 83 DEGREES
PCP UR QL: NEGATIVE
PH UR STRIP.AUTO: 5 [PH]
PHOSPHATE SERPL-MCNC: 3.2 MG/DL (ref 2.3–4.1)
PLATELET # BLD AUTO: 138 THOUSANDS/UL (ref 149–390)
PLATELET # BLD AUTO: 140 THOUSANDS/UL (ref 149–390)
PLATELET # BLD AUTO: 142 THOUSANDS/UL (ref 149–390)
PLATELET # BLD AUTO: 149 THOUSANDS/UL (ref 149–390)
PLATELET # BLD AUTO: 159 THOUSANDS/UL (ref 149–390)
PLATELET # BLD AUTO: 165 THOUSANDS/UL (ref 149–390)
PLATELET # BLD AUTO: 175 THOUSANDS/UL (ref 149–390)
PLATELET # BLD AUTO: 186 THOUSANDS/UL (ref 149–390)
PMV BLD AUTO: 11.3 FL (ref 8.9–12.7)
PMV BLD AUTO: 11.3 FL (ref 8.9–12.7)
PMV BLD AUTO: 11.4 FL (ref 8.9–12.7)
PMV BLD AUTO: 11.5 FL (ref 8.9–12.7)
PMV BLD AUTO: 11.6 FL (ref 8.9–12.7)
PMV BLD AUTO: 11.7 FL (ref 8.9–12.7)
PMV BLD AUTO: 11.8 FL (ref 8.9–12.7)
PMV BLD AUTO: 11.9 FL (ref 8.9–12.7)
POTASSIUM SERPL-SCNC: 3.2 MMOL/L (ref 3.5–5.3)
POTASSIUM SERPL-SCNC: 3.4 MMOL/L (ref 3.5–5.3)
POTASSIUM SERPL-SCNC: 3.7 MMOL/L (ref 3.5–5.3)
POTASSIUM SERPL-SCNC: 3.7 MMOL/L (ref 3.5–5.3)
POTASSIUM SERPL-SCNC: 3.9 MMOL/L (ref 3.5–5.3)
POTASSIUM SERPL-SCNC: 3.9 MMOL/L (ref 3.5–5.3)
POTASSIUM SERPL-SCNC: 4.1 MMOL/L (ref 3.5–5.3)
PR INTERVAL: 284 MS
PR INTERVAL: 312 MS
PR INTERVAL: 354 MS
PROCALCITONIN SERPL-MCNC: 0.08 NG/ML
PROCALCITONIN SERPL-MCNC: 0.6 NG/ML
PROCALCITONIN SERPL-MCNC: 0.85 NG/ML
PROCALCITONIN SERPL-MCNC: 1.06 NG/ML
PROCALCITONIN SERPL-MCNC: 1.09 NG/ML
PROT SERPL-MCNC: 5.8 G/DL (ref 6.4–8.2)
PROT SERPL-MCNC: 5.8 G/DL (ref 6.4–8.2)
PROT SERPL-MCNC: 6.2 G/DL (ref 6.4–8.2)
PROT SERPL-MCNC: 6.4 G/DL (ref 6.4–8.2)
PROT UR STRIP-MCNC: NEGATIVE MG/DL
PROTHROMBIN TIME: 14.6 SECONDS (ref 11.6–14.5)
PROTHROMBIN TIME: 15.9 SECONDS (ref 11.6–14.5)
QRS AXIS: 0 DEGREES
QRS AXIS: 25 DEGREES
QRS AXIS: 36 DEGREES
QRS AXIS: 4 DEGREES
QRS AXIS: 45 DEGREES
QRS AXIS: 6 DEGREES
QRSD INTERVAL: 0 MS
QRSD INTERVAL: 100 MS
QRSD INTERVAL: 102 MS
QRSD INTERVAL: 112 MS
QRSD INTERVAL: 94 MS
QRSD INTERVAL: 98 MS
QT INTERVAL: 0 MS
QT INTERVAL: 314 MS
QT INTERVAL: 358 MS
QT INTERVAL: 360 MS
QT INTERVAL: 400 MS
QT INTERVAL: 408 MS
QTC INTERVAL: 0 MS
QTC INTERVAL: 402 MS
QTC INTERVAL: 408 MS
QTC INTERVAL: 412 MS
QTC INTERVAL: 415 MS
QTC INTERVAL: 428 MS
RBC # BLD AUTO: 1.93 MILLION/UL (ref 3.88–5.62)
RBC # BLD AUTO: 2.03 MILLION/UL (ref 3.88–5.62)
RBC # BLD AUTO: 2.11 MILLION/UL (ref 3.88–5.62)
RBC # BLD AUTO: 2.11 MILLION/UL (ref 3.88–5.62)
RBC # BLD AUTO: 2.45 MILLION/UL (ref 3.88–5.62)
RBC # BLD AUTO: 2.64 MILLION/UL (ref 3.88–5.62)
RBC # BLD AUTO: 3.04 MILLION/UL (ref 3.88–5.62)
RBC #/AREA URNS AUTO: ABNORMAL /HPF
REPAGLINIDE SERPL-MCNC: NEGATIVE NG/ML
RH BLD: POSITIVE
RSV RNA RESP QL NAA+PROBE: NEGATIVE
SALICYLATES SERPL-MCNC: <3 MG/DL (ref 3–20)
SARS-COV-2 RNA RESP QL NAA+PROBE: NEGATIVE
SODIUM SERPL-SCNC: 134 MMOL/L (ref 136–145)
SODIUM SERPL-SCNC: 138 MMOL/L (ref 136–145)
SODIUM SERPL-SCNC: 142 MMOL/L (ref 136–145)
SODIUM SERPL-SCNC: 143 MMOL/L (ref 136–145)
SODIUM SERPL-SCNC: 144 MMOL/L (ref 136–145)
SODIUM SERPL-SCNC: 144 MMOL/L (ref 136–145)
SODIUM SERPL-SCNC: 145 MMOL/L (ref 136–145)
SODIUM SERPL-SCNC: 145 MMOL/L (ref 136–145)
SODIUM SERPL-SCNC: 148 MMOL/L (ref 136–145)
SP GR UR STRIP.AUTO: 1.02 (ref 1–1.03)
SPECIMEN EXPIRATION DATE: NORMAL
T WAVE AXIS: 0 DEGREES
T WAVE AXIS: 101 DEGREES
T WAVE AXIS: 115 DEGREES
T WAVE AXIS: 164 DEGREES
T WAVE AXIS: 72 DEGREES
T WAVE AXIS: 84 DEGREES
T3 SERPL-MCNC: 0.5 NG/ML (ref 0.6–1.8)
T4 FREE SERPL-MCNC: 1.21 NG/DL (ref 0.76–1.46)
THC UR QL: NEGATIVE
TOLAZAMIDE SERPL-MCNC: NEGATIVE UG/ML
TOLBUTAMIDE SERPL-MCNC: NEGATIVE UG/ML (ref 40–100)
TSH SERPL DL<=0.05 MIU/L-ACNC: 4.98 UIU/ML (ref 0.36–3.74)
UROBILINOGEN UR QL STRIP.AUTO: 0.2 E.U./DL
VENTRICULAR RATE: 0 BPM
VENTRICULAR RATE: 60 BPM
VENTRICULAR RATE: 64 BPM
VENTRICULAR RATE: 80 BPM
VENTRICULAR RATE: 86 BPM
VENTRICULAR RATE: 99 BPM
WBC # BLD AUTO: 5.45 THOUSAND/UL (ref 4.31–10.16)
WBC # BLD AUTO: 5.57 THOUSAND/UL (ref 4.31–10.16)
WBC # BLD AUTO: 5.81 THOUSAND/UL (ref 4.31–10.16)
WBC # BLD AUTO: 6.17 THOUSAND/UL (ref 4.31–10.16)
WBC # BLD AUTO: 7.31 THOUSAND/UL (ref 4.31–10.16)
WBC # BLD AUTO: 8.61 THOUSAND/UL (ref 4.31–10.16)
WBC # BLD AUTO: 9.79 THOUSAND/UL (ref 4.31–10.16)
WBC #/AREA URNS AUTO: ABNORMAL /HPF

## 2022-01-01 PROCEDURE — 85025 COMPLETE CBC W/AUTO DIFF WBC: CPT

## 2022-01-01 PROCEDURE — 83605 ASSAY OF LACTIC ACID: CPT | Performed by: PHYSICIAN ASSISTANT

## 2022-01-01 PROCEDURE — 94640 AIRWAY INHALATION TREATMENT: CPT

## 2022-01-01 PROCEDURE — 82330 ASSAY OF CALCIUM: CPT | Performed by: PHYSICIAN ASSISTANT

## 2022-01-01 PROCEDURE — 92610 EVALUATE SWALLOWING FUNCTION: CPT

## 2022-01-01 PROCEDURE — 94760 N-INVAS EAR/PLS OXIMETRY 1: CPT

## 2022-01-01 PROCEDURE — C9113 INJ PANTOPRAZOLE SODIUM, VIA: HCPCS

## 2022-01-01 PROCEDURE — 84484 ASSAY OF TROPONIN QUANT: CPT

## 2022-01-01 PROCEDURE — 85730 THROMBOPLASTIN TIME PARTIAL: CPT

## 2022-01-01 PROCEDURE — 85027 COMPLETE CBC AUTOMATED: CPT | Performed by: INTERNAL MEDICINE

## 2022-01-01 PROCEDURE — 80377 DRUG/SUBSTANCE NOS 7/MORE: CPT | Performed by: INTERNAL MEDICINE

## 2022-01-01 PROCEDURE — 99232 SBSQ HOSP IP/OBS MODERATE 35: CPT | Performed by: INTERNAL MEDICINE

## 2022-01-01 PROCEDURE — 82948 REAGENT STRIP/BLOOD GLUCOSE: CPT

## 2022-01-01 PROCEDURE — 99291 CRITICAL CARE FIRST HOUR: CPT

## 2022-01-01 PROCEDURE — 85610 PROTHROMBIN TIME: CPT

## 2022-01-01 PROCEDURE — 83735 ASSAY OF MAGNESIUM: CPT | Performed by: PHYSICIAN ASSISTANT

## 2022-01-01 PROCEDURE — 99233 SBSQ HOSP IP/OBS HIGH 50: CPT | Performed by: INTERNAL MEDICINE

## 2022-01-01 PROCEDURE — 84145 PROCALCITONIN (PCT): CPT | Performed by: INTERNAL MEDICINE

## 2022-01-01 PROCEDURE — 87040 BLOOD CULTURE FOR BACTERIA: CPT

## 2022-01-01 PROCEDURE — 84443 ASSAY THYROID STIM HORMONE: CPT

## 2022-01-01 PROCEDURE — 93005 ELECTROCARDIOGRAM TRACING: CPT

## 2022-01-01 PROCEDURE — 0241U HB NFCT DS VIR RESP RNA 4 TRGT: CPT

## 2022-01-01 PROCEDURE — 99204 OFFICE O/P NEW MOD 45 MIN: CPT | Performed by: PSYCHIATRY & NEUROLOGY

## 2022-01-01 PROCEDURE — 83605 ASSAY OF LACTIC ACID: CPT

## 2022-01-01 PROCEDURE — 84145 PROCALCITONIN (PCT): CPT | Performed by: NURSE PRACTITIONER

## 2022-01-01 PROCEDURE — 84145 PROCALCITONIN (PCT): CPT

## 2022-01-01 PROCEDURE — 99309 SBSQ NF CARE MODERATE MDM 30: CPT | Performed by: NURSE PRACTITIONER

## 2022-01-01 PROCEDURE — 96361 HYDRATE IV INFUSION ADD-ON: CPT

## 2022-01-01 PROCEDURE — 82140 ASSAY OF AMMONIA: CPT | Performed by: PSYCHIATRY & NEUROLOGY

## 2022-01-01 PROCEDURE — 94669 MECHANICAL CHEST WALL OSCILL: CPT

## 2022-01-01 PROCEDURE — 94664 DEMO&/EVAL PT USE INHALER: CPT

## 2022-01-01 PROCEDURE — 86337 INSULIN ANTIBODIES: CPT | Performed by: INTERNAL MEDICINE

## 2022-01-01 PROCEDURE — 70450 CT HEAD/BRAIN W/O DYE: CPT

## 2022-01-01 PROCEDURE — 87186 SC STD MICRODIL/AGAR DIL: CPT | Performed by: NURSE PRACTITIONER

## 2022-01-01 PROCEDURE — 86850 RBC ANTIBODY SCREEN: CPT | Performed by: PHYSICIAN ASSISTANT

## 2022-01-01 PROCEDURE — 85025 COMPLETE CBC W/AUTO DIFF WBC: CPT | Performed by: INTERNAL MEDICINE

## 2022-01-01 PROCEDURE — 83036 HEMOGLOBIN GLYCOSYLATED A1C: CPT | Performed by: INTERNAL MEDICINE

## 2022-01-01 PROCEDURE — 82010 KETONE BODYS QUAN: CPT | Performed by: PHYSICIAN ASSISTANT

## 2022-01-01 PROCEDURE — 80143 DRUG ASSAY ACETAMINOPHEN: CPT

## 2022-01-01 PROCEDURE — 80307 DRUG TEST PRSMV CHEM ANLYZR: CPT

## 2022-01-01 PROCEDURE — 80048 BASIC METABOLIC PNL TOTAL CA: CPT | Performed by: INTERNAL MEDICINE

## 2022-01-01 PROCEDURE — 86901 BLOOD TYPING SEROLOGIC RH(D): CPT | Performed by: PHYSICIAN ASSISTANT

## 2022-01-01 PROCEDURE — 99232 SBSQ HOSP IP/OBS MODERATE 35: CPT | Performed by: PSYCHIATRY & NEUROLOGY

## 2022-01-01 PROCEDURE — 85049 AUTOMATED PLATELET COUNT: CPT

## 2022-01-01 PROCEDURE — 87070 CULTURE OTHR SPECIMN AEROBIC: CPT | Performed by: NURSE PRACTITIONER

## 2022-01-01 PROCEDURE — 83519 RIA NONANTIBODY: CPT | Performed by: PHYSICIAN ASSISTANT

## 2022-01-01 PROCEDURE — 99222 1ST HOSP IP/OBS MODERATE 55: CPT | Performed by: PSYCHIATRY & NEUROLOGY

## 2022-01-01 PROCEDURE — 74177 CT ABD & PELVIS W/CONTRAST: CPT

## 2022-01-01 PROCEDURE — 99291 CRITICAL CARE FIRST HOUR: CPT | Performed by: EMERGENCY MEDICINE

## 2022-01-01 PROCEDURE — 99222 1ST HOSP IP/OBS MODERATE 55: CPT | Performed by: INTERNAL MEDICINE

## 2022-01-01 PROCEDURE — 96365 THER/PROPH/DIAG IV INF INIT: CPT

## 2022-01-01 PROCEDURE — 80053 COMPREHEN METABOLIC PANEL: CPT

## 2022-01-01 PROCEDURE — 84100 ASSAY OF PHOSPHORUS: CPT | Performed by: PHYSICIAN ASSISTANT

## 2022-01-01 PROCEDURE — 85025 COMPLETE CBC W/AUTO DIFF WBC: CPT | Performed by: PHYSICIAN ASSISTANT

## 2022-01-01 PROCEDURE — NC001 PR NO CHARGE: Performed by: PHYSICIAN ASSISTANT

## 2022-01-01 PROCEDURE — 93010 ELECTROCARDIOGRAM REPORT: CPT | Performed by: INTERNAL MEDICINE

## 2022-01-01 PROCEDURE — 81001 URINALYSIS AUTO W/SCOPE: CPT

## 2022-01-01 PROCEDURE — 80053 COMPREHEN METABOLIC PANEL: CPT | Performed by: INTERNAL MEDICINE

## 2022-01-01 PROCEDURE — 92526 ORAL FUNCTION THERAPY: CPT

## 2022-01-01 PROCEDURE — 80053 COMPREHEN METABOLIC PANEL: CPT | Performed by: PHYSICIAN ASSISTANT

## 2022-01-01 PROCEDURE — G1004 CDSM NDSC: HCPCS

## 2022-01-01 PROCEDURE — 80179 DRUG ASSAY SALICYLATE: CPT

## 2022-01-01 PROCEDURE — 80048 BASIC METABOLIC PNL TOTAL CA: CPT | Performed by: PHYSICIAN ASSISTANT

## 2022-01-01 PROCEDURE — 85610 PROTHROMBIN TIME: CPT | Performed by: PHYSICIAN ASSISTANT

## 2022-01-01 PROCEDURE — 84480 ASSAY TRIIODOTHYRONINE (T3): CPT

## 2022-01-01 PROCEDURE — 84439 ASSAY OF FREE THYROXINE: CPT

## 2022-01-01 PROCEDURE — 99448 NTRPROF PH1/NTRNET/EHR 21-30: CPT | Performed by: EMERGENCY MEDICINE

## 2022-01-01 PROCEDURE — 99239 HOSP IP/OBS DSCHRG MGMT >30: CPT | Performed by: HOSPITALIST

## 2022-01-01 PROCEDURE — 83605 ASSAY OF LACTIC ACID: CPT | Performed by: NURSE PRACTITIONER

## 2022-01-01 PROCEDURE — 99232 SBSQ HOSP IP/OBS MODERATE 35: CPT | Performed by: PHYSICIAN ASSISTANT

## 2022-01-01 PROCEDURE — 82533 TOTAL CORTISOL: CPT | Performed by: PHYSICIAN ASSISTANT

## 2022-01-01 PROCEDURE — 94668 MNPJ CHEST WALL SBSQ: CPT

## 2022-01-01 PROCEDURE — NC001 PR NO CHARGE: Performed by: INTERNAL MEDICINE

## 2022-01-01 PROCEDURE — 71260 CT THORAX DX C+: CPT

## 2022-01-01 PROCEDURE — 84681 ASSAY OF C-PEPTIDE: CPT | Performed by: INTERNAL MEDICINE

## 2022-01-01 PROCEDURE — 87205 SMEAR GRAM STAIN: CPT | Performed by: NURSE PRACTITIONER

## 2022-01-01 PROCEDURE — 82533 TOTAL CORTISOL: CPT | Performed by: INTERNAL MEDICINE

## 2022-01-01 PROCEDURE — 99291 CRITICAL CARE FIRST HOUR: CPT | Performed by: NURSE PRACTITIONER

## 2022-01-01 PROCEDURE — 83519 RIA NONANTIBODY: CPT

## 2022-01-01 PROCEDURE — 36415 COLL VENOUS BLD VENIPUNCTURE: CPT

## 2022-01-01 PROCEDURE — 85018 HEMOGLOBIN: CPT | Performed by: PHYSICIAN ASSISTANT

## 2022-01-01 PROCEDURE — 96375 TX/PRO/DX INJ NEW DRUG ADDON: CPT

## 2022-01-01 PROCEDURE — 86900 BLOOD TYPING SEROLOGIC ABO: CPT | Performed by: PHYSICIAN ASSISTANT

## 2022-01-01 PROCEDURE — 99304 1ST NF CARE SF/LOW MDM 25: CPT | Performed by: NURSE PRACTITIONER

## 2022-01-01 PROCEDURE — 96367 TX/PROPH/DG ADDL SEQ IV INF: CPT

## 2022-01-01 PROCEDURE — 82077 ASSAY SPEC XCP UR&BREATH IA: CPT

## 2022-01-01 RX ORDER — DIPHENHYDRAMINE HCL 25 MG
25 TABLET ORAL ONCE
Status: CANCELLED | OUTPATIENT
Start: 2022-01-01

## 2022-01-01 RX ORDER — SODIUM CHLORIDE 9 MG/ML
20 INJECTION, SOLUTION INTRAVENOUS ONCE
Status: CANCELLED | OUTPATIENT
Start: 2022-01-01

## 2022-01-01 RX ORDER — POTASSIUM CHLORIDE 14.9 MG/ML
20 INJECTION INTRAVENOUS
Status: COMPLETED | OUTPATIENT
Start: 2022-01-01 | End: 2022-01-01

## 2022-01-01 RX ORDER — LIDOCAINE 50 MG/G
1 PATCH TOPICAL DAILY
Status: DISCONTINUED | OUTPATIENT
Start: 2022-01-01 | End: 2022-01-01

## 2022-01-01 RX ORDER — ACETAMINOPHEN 325 MG/1
975 TABLET ORAL ONCE
Status: CANCELLED | OUTPATIENT
Start: 2022-01-01

## 2022-01-01 RX ORDER — MAGNESIUM SULFATE HEPTAHYDRATE 40 MG/ML
2 INJECTION, SOLUTION INTRAVENOUS ONCE
Status: COMPLETED | OUTPATIENT
Start: 2022-01-01 | End: 2022-01-01

## 2022-01-01 RX ORDER — MORPHINE SULFATE 100 MG/5ML
10 SOLUTION ORAL EVERY 2 HOUR PRN
Qty: 60 ML | Refills: 0 | Status: SHIPPED | OUTPATIENT
Start: 2022-01-01 | End: 2022-03-26

## 2022-01-01 RX ORDER — POTASSIUM CHLORIDE 20MEQ/15ML
40 LIQUID (ML) ORAL ONCE
Status: DISCONTINUED | OUTPATIENT
Start: 2022-01-01 | End: 2022-01-01

## 2022-01-01 RX ORDER — POTASSIUM CHLORIDE 14.9 MG/ML
20 INJECTION INTRAVENOUS ONCE
Status: COMPLETED | OUTPATIENT
Start: 2022-01-01 | End: 2022-01-01

## 2022-01-01 RX ORDER — DEXTROSE AND SODIUM CHLORIDE 5; .45 G/100ML; G/100ML
150 INJECTION, SOLUTION INTRAVENOUS CONTINUOUS
Status: DISCONTINUED | OUTPATIENT
Start: 2022-01-01 | End: 2022-01-01

## 2022-01-01 RX ORDER — DOXEPIN HYDROCHLORIDE 6 MG/1
6 TABLET ORAL DAILY PRN
COMMUNITY
End: 2022-01-01 | Stop reason: HOSPADM

## 2022-01-01 RX ORDER — GLYCOPYRROLATE 0.2 MG/ML
0.1 INJECTION INTRAMUSCULAR; INTRAVENOUS EVERY 4 HOURS PRN
Status: DISCONTINUED | OUTPATIENT
Start: 2022-01-01 | End: 2022-01-01 | Stop reason: HOSPADM

## 2022-01-01 RX ORDER — IPRATROPIUM BROMIDE AND ALBUTEROL SULFATE 2.5; .5 MG/3ML; MG/3ML
3 SOLUTION RESPIRATORY (INHALATION) 4 TIMES DAILY
COMMUNITY

## 2022-01-01 RX ORDER — LORAZEPAM 2 MG/ML
1 CONCENTRATE ORAL EVERY 4 HOURS PRN
Qty: 30 ML | Refills: 0 | Status: SHIPPED | OUTPATIENT
Start: 2022-01-01 | End: 2022-03-26

## 2022-01-01 RX ORDER — FENTANYL CITRATE 50 UG/ML
50 INJECTION, SOLUTION INTRAMUSCULAR; INTRAVENOUS ONCE
Status: COMPLETED | OUTPATIENT
Start: 2022-01-01 | End: 2022-01-01

## 2022-01-01 RX ORDER — DEXTROSE MONOHYDRATE 100 MG/ML
INJECTION, SOLUTION INTRAVENOUS
Status: COMPLETED
Start: 2022-01-01 | End: 2022-01-01

## 2022-01-01 RX ORDER — DEXTROSE AND SODIUM CHLORIDE 5; .9 G/100ML; G/100ML
125 INJECTION, SOLUTION INTRAVENOUS CONTINUOUS
Status: DISCONTINUED | OUTPATIENT
Start: 2022-01-01 | End: 2022-01-01

## 2022-01-01 RX ORDER — ESOMEPRAZOLE MAGNESIUM 40 MG/1
FOR SUSPENSION ORAL
COMMUNITY
Start: 2022-01-01 | End: 2022-01-01 | Stop reason: HOSPADM

## 2022-01-01 RX ORDER — DEXTROSE 10 % IN WATER 10 %
250 INTRAVENOUS SOLUTION INTRAVENOUS ONCE
Status: COMPLETED | OUTPATIENT
Start: 2022-01-01 | End: 2022-01-01

## 2022-01-01 RX ORDER — POTASSIUM CHLORIDE 14.9 MG/ML
20 INJECTION INTRAVENOUS ONCE
Status: DISCONTINUED | OUTPATIENT
Start: 2022-01-01 | End: 2022-01-01

## 2022-01-01 RX ORDER — HALOPERIDOL 5 MG/ML
0.5 INJECTION INTRAMUSCULAR EVERY 4 HOURS
Status: DISCONTINUED | OUTPATIENT
Start: 2022-01-01 | End: 2022-01-01 | Stop reason: HOSPADM

## 2022-01-01 RX ORDER — GLYCOPYRROLATE 0.2 MG/ML
0.1 INJECTION INTRAMUSCULAR; INTRAVENOUS EVERY 4 HOURS PRN
Qty: 1 ML | Refills: 0 | Status: SHIPPED | OUTPATIENT
Start: 2022-01-01

## 2022-01-01 RX ORDER — DEXTROSE MONOHYDRATE 100 MG/ML
75 INJECTION, SOLUTION INTRAVENOUS CONTINUOUS
Status: DISCONTINUED | OUTPATIENT
Start: 2022-01-01 | End: 2022-01-01

## 2022-01-01 RX ORDER — ACETAMINOPHEN 160 MG/5ML
650 SUSPENSION, ORAL (FINAL DOSE FORM) ORAL EVERY 6 HOURS SCHEDULED
Status: DISCONTINUED | OUTPATIENT
Start: 2022-01-01 | End: 2022-01-01

## 2022-01-01 RX ORDER — LEVALBUTEROL 1.25 MG/.5ML
1.25 SOLUTION, CONCENTRATE RESPIRATORY (INHALATION) 3 TIMES DAILY PRN
Status: DISCONTINUED | OUTPATIENT
Start: 2022-01-01 | End: 2022-01-01 | Stop reason: HOSPADM

## 2022-01-01 RX ORDER — ERGOCALCIFEROL (VITAMIN D2) 1250 MCG
50000 CAPSULE ORAL WEEKLY
COMMUNITY
End: 2022-01-01 | Stop reason: HOSPADM

## 2022-01-01 RX ORDER — HALOPERIDOL 5 MG/ML
2 INJECTION INTRAMUSCULAR ONCE
Status: COMPLETED | OUTPATIENT
Start: 2022-01-01 | End: 2022-01-01

## 2022-01-01 RX ORDER — HALOPERIDOL 5 MG/ML
0.5 INJECTION INTRAMUSCULAR EVERY 2 HOUR PRN
Status: DISCONTINUED | OUTPATIENT
Start: 2022-01-01 | End: 2022-01-01

## 2022-01-01 RX ORDER — AMLODIPINE BESYLATE 5 MG/1
5 TABLET ORAL DAILY
COMMUNITY

## 2022-01-01 RX ORDER — HALOPERIDOL 5 MG/ML
5 INJECTION INTRAMUSCULAR ONCE
Status: COMPLETED | OUTPATIENT
Start: 2022-01-01 | End: 2022-01-01

## 2022-01-01 RX ORDER — MORPHINE SULFATE 100 MG/5ML
10 SOLUTION ORAL EVERY 2 HOUR PRN
Qty: 60 ML | Refills: 0 | Status: SHIPPED | OUTPATIENT
Start: 2022-01-01 | End: 2022-01-01 | Stop reason: SDUPTHER

## 2022-01-01 RX ORDER — LEVALBUTEROL INHALATION SOLUTION 1.25 MG/3ML
1.25 SOLUTION RESPIRATORY (INHALATION)
Status: DISCONTINUED | OUTPATIENT
Start: 2022-01-01 | End: 2022-01-01

## 2022-01-01 RX ORDER — DEXTROSE, SODIUM CHLORIDE, AND POTASSIUM CHLORIDE 5; .45; .15 G/100ML; G/100ML; G/100ML
50 INJECTION INTRAVENOUS CONTINUOUS
Status: DISCONTINUED | OUTPATIENT
Start: 2022-01-01 | End: 2022-01-01

## 2022-01-01 RX ORDER — IPRATROPIUM BROMIDE AND ALBUTEROL SULFATE 2.5; .5 MG/3ML; MG/3ML
3 SOLUTION RESPIRATORY (INHALATION) EVERY 6 HOURS PRN
COMMUNITY
End: 2022-01-01 | Stop reason: HOSPADM

## 2022-01-01 RX ORDER — VANCOMYCIN HYDROCHLORIDE 1 G/200ML
12.5 INJECTION, SOLUTION INTRAVENOUS EVERY 24 HOURS
Status: DISCONTINUED | OUTPATIENT
Start: 2022-01-01 | End: 2022-01-01

## 2022-01-01 RX ORDER — DEXTROSE MONOHYDRATE 100 MG/ML
125 INJECTION, SOLUTION INTRAVENOUS CONTINUOUS
Status: DISCONTINUED | OUTPATIENT
Start: 2022-01-01 | End: 2022-01-01

## 2022-01-01 RX ORDER — LIDOCAINE 50 MG/G
1 PATCH TOPICAL DAILY
Status: DISCONTINUED | OUTPATIENT
Start: 2022-01-01 | End: 2022-01-01 | Stop reason: HOSPADM

## 2022-01-01 RX ORDER — SODIUM CHLORIDE 9 MG/ML
3 INJECTION INTRAVENOUS
Status: DISCONTINUED | OUTPATIENT
Start: 2022-01-01 | End: 2022-01-01 | Stop reason: HOSPADM

## 2022-01-01 RX ORDER — HEPARIN SODIUM 5000 [USP'U]/ML
5000 INJECTION, SOLUTION INTRAVENOUS; SUBCUTANEOUS EVERY 8 HOURS SCHEDULED
Status: CANCELLED | OUTPATIENT
Start: 2022-01-01

## 2022-01-01 RX ORDER — PANTOPRAZOLE SODIUM 40 MG/1
40 INJECTION, POWDER, FOR SOLUTION INTRAVENOUS EVERY 12 HOURS SCHEDULED
Status: DISCONTINUED | OUTPATIENT
Start: 2022-01-01 | End: 2022-01-01

## 2022-01-01 RX ORDER — VIT A/VIT C/VIT E/ZINC/COPPER 2148-113
1 TABLET ORAL DAILY
COMMUNITY
End: 2022-01-01 | Stop reason: HOSPADM

## 2022-01-01 RX ORDER — OCTREOTIDE ACETATE 100 UG/ML
50 INJECTION, SOLUTION INTRAVENOUS; SUBCUTANEOUS ONCE
Status: COMPLETED | OUTPATIENT
Start: 2022-01-01 | End: 2022-01-01

## 2022-01-01 RX ORDER — OXYBUTYNIN CHLORIDE 5 MG/1
5 TABLET ORAL DAILY
COMMUNITY
End: 2022-01-01 | Stop reason: ALTCHOICE

## 2022-01-01 RX ORDER — LORAZEPAM 2 MG/ML
1 CONCENTRATE ORAL EVERY 4 HOURS PRN
Qty: 30 ML | Refills: 0 | Status: SHIPPED | OUTPATIENT
Start: 2022-01-01 | End: 2022-01-01 | Stop reason: SDUPTHER

## 2022-01-01 RX ORDER — HEPARIN SODIUM 5000 [USP'U]/ML
5000 INJECTION, SOLUTION INTRAVENOUS; SUBCUTANEOUS EVERY 8 HOURS SCHEDULED
Status: DISCONTINUED | OUTPATIENT
Start: 2022-01-01 | End: 2022-01-01

## 2022-01-01 RX ORDER — HALOPERIDOL 5 MG/ML
INJECTION INTRAMUSCULAR
Status: COMPLETED
Start: 2022-01-01 | End: 2022-01-01

## 2022-01-01 RX ORDER — DEXTROSE 10 % IN WATER 10 %
100 INTRAVENOUS SOLUTION INTRAVENOUS ONCE
Status: COMPLETED | OUTPATIENT
Start: 2022-01-01 | End: 2022-01-01

## 2022-01-01 RX ORDER — ATROPINE SULFATE 1 MG/ML
0.5 INJECTION, SOLUTION INTRAMUSCULAR; INTRAVENOUS; SUBCUTANEOUS ONCE
Status: DISCONTINUED | OUTPATIENT
Start: 2022-01-01 | End: 2022-01-01

## 2022-01-01 RX ORDER — VANCOMYCIN HYDROCHLORIDE 1 G/200ML
15 INJECTION, SOLUTION INTRAVENOUS ONCE
Status: COMPLETED | OUTPATIENT
Start: 2022-01-01 | End: 2022-01-01

## 2022-01-01 RX ADMIN — LEVALBUTEROL HYDROCHLORIDE 1.25 MG: 1.25 SOLUTION RESPIRATORY (INHALATION) at 08:05

## 2022-01-01 RX ADMIN — LEVALBUTEROL HYDROCHLORIDE 1.25 MG: 1.25 SOLUTION RESPIRATORY (INHALATION) at 21:25

## 2022-01-01 RX ADMIN — Medication 20 G: at 00:28

## 2022-01-01 RX ADMIN — HALOPERIDOL 5 MG: 5 INJECTION INTRAMUSCULAR at 01:06

## 2022-01-01 RX ADMIN — Medication 20 G: at 00:24

## 2022-01-01 RX ADMIN — LEVALBUTEROL HYDROCHLORIDE 1.25 MG: 1.25 SOLUTION RESPIRATORY (INHALATION) at 14:29

## 2022-01-01 RX ADMIN — MAGNESIUM SULFATE HEPTAHYDRATE 2 G: 40 INJECTION, SOLUTION INTRAVENOUS at 09:40

## 2022-01-01 RX ADMIN — LEVALBUTEROL HYDROCHLORIDE 1.25 MG: 1.25 SOLUTION RESPIRATORY (INHALATION) at 08:15

## 2022-01-01 RX ADMIN — VANCOMYCIN HYDROCHLORIDE 1000 MG: 1 INJECTION, SOLUTION INTRAVENOUS at 16:10

## 2022-01-01 RX ADMIN — HEPARIN SODIUM 5000 UNITS: 5000 INJECTION INTRAVENOUS; SUBCUTANEOUS at 21:04

## 2022-01-01 RX ADMIN — DEXTROSE, SODIUM CHLORIDE, AND POTASSIUM CHLORIDE 75 ML/HR: 5; .45; .15 INJECTION INTRAVENOUS at 05:00

## 2022-01-01 RX ADMIN — PANTOPRAZOLE SODIUM 40 MG: 40 INJECTION, POWDER, FOR SOLUTION INTRAVENOUS at 07:46

## 2022-01-01 RX ADMIN — PANTOPRAZOLE SODIUM 40 MG: 40 INJECTION, POWDER, FOR SOLUTION INTRAVENOUS at 21:17

## 2022-01-01 RX ADMIN — POTASSIUM CHLORIDE 20 MEQ: 200 INJECTION, SOLUTION INTRAVENOUS at 05:23

## 2022-01-01 RX ADMIN — PANTOPRAZOLE SODIUM 40 MG: 40 INJECTION, POWDER, FOR SOLUTION INTRAVENOUS at 07:39

## 2022-01-01 RX ADMIN — DEXTROSE AND SODIUM CHLORIDE 100 ML/HR: 5; .45 INJECTION, SOLUTION INTRAVENOUS at 16:54

## 2022-01-01 RX ADMIN — PANTOPRAZOLE SODIUM 40 MG: 40 INJECTION, POWDER, FOR SOLUTION INTRAVENOUS at 07:52

## 2022-01-01 RX ADMIN — HEPARIN SODIUM 5000 UNITS: 5000 INJECTION INTRAVENOUS; SUBCUTANEOUS at 14:32

## 2022-01-01 RX ADMIN — LEVALBUTEROL HYDROCHLORIDE 1.25 MG: 1.25 SOLUTION RESPIRATORY (INHALATION) at 13:15

## 2022-01-01 RX ADMIN — LIDOCAINE 5% 1 PATCH: 700 PATCH TOPICAL at 10:40

## 2022-01-01 RX ADMIN — HEPARIN SODIUM 5000 UNITS: 5000 INJECTION INTRAVENOUS; SUBCUTANEOUS at 21:32

## 2022-01-01 RX ADMIN — LIDOCAINE 5% 1 PATCH: 700 PATCH TOPICAL at 07:46

## 2022-01-01 RX ADMIN — IPRATROPIUM BROMIDE 0.5 MG: 0.5 SOLUTION RESPIRATORY (INHALATION) at 08:30

## 2022-01-01 RX ADMIN — PANTOPRAZOLE SODIUM 40 MG: 40 INJECTION, POWDER, FOR SOLUTION INTRAVENOUS at 08:08

## 2022-01-01 RX ADMIN — POTASSIUM CHLORIDE 20 MEQ: 200 INJECTION, SOLUTION INTRAVENOUS at 22:19

## 2022-01-01 RX ADMIN — HEPARIN SODIUM 5000 UNITS: 5000 INJECTION INTRAVENOUS; SUBCUTANEOUS at 05:56

## 2022-01-01 RX ADMIN — THIAMINE HYDROCHLORIDE 100 MG: 100 INJECTION, SOLUTION INTRAMUSCULAR; INTRAVENOUS at 12:28

## 2022-01-01 RX ADMIN — MAGNESIUM SULFATE HEPTAHYDRATE 2 G: 40 INJECTION, SOLUTION INTRAVENOUS at 13:40

## 2022-01-01 RX ADMIN — Medication 250 ML: at 13:39

## 2022-01-01 RX ADMIN — IPRATROPIUM BROMIDE 0.5 MG: 0.5 SOLUTION RESPIRATORY (INHALATION) at 08:05

## 2022-01-01 RX ADMIN — POTASSIUM CHLORIDE 20 MEQ: 200 INJECTION, SOLUTION INTRAVENOUS at 11:30

## 2022-01-01 RX ADMIN — HEPARIN SODIUM 5000 UNITS: 5000 INJECTION INTRAVENOUS; SUBCUTANEOUS at 21:52

## 2022-01-01 RX ADMIN — FENTANYL CITRATE 50 MCG: 50 INJECTION INTRAMUSCULAR; INTRAVENOUS at 06:14

## 2022-01-01 RX ADMIN — DEXTROSE 125 ML/HR: 10 SOLUTION INTRAVENOUS at 07:06

## 2022-01-01 RX ADMIN — SODIUM CHLORIDE 500 ML: 0.9 INJECTION, SOLUTION INTRAVENOUS at 13:18

## 2022-01-01 RX ADMIN — MORPHINE SULFATE 2 MG: 2 INJECTION, SOLUTION INTRAMUSCULAR; INTRAVENOUS at 19:10

## 2022-01-01 RX ADMIN — POTASSIUM CHLORIDE 20 MEQ: 14.9 INJECTION, SOLUTION INTRAVENOUS at 09:40

## 2022-01-01 RX ADMIN — LEVALBUTEROL HYDROCHLORIDE 1.25 MG: 1.25 SOLUTION RESPIRATORY (INHALATION) at 13:08

## 2022-01-01 RX ADMIN — DEXTROSE 125 ML/HR: 10 SOLUTION INTRAVENOUS at 22:37

## 2022-01-01 RX ADMIN — HEPARIN SODIUM 5000 UNITS: 5000 INJECTION INTRAVENOUS; SUBCUTANEOUS at 05:19

## 2022-01-01 RX ADMIN — IPRATROPIUM BROMIDE 0.5 MG: 0.5 SOLUTION RESPIRATORY (INHALATION) at 19:40

## 2022-01-01 RX ADMIN — POTASSIUM CHLORIDE 20 MEQ: 200 INJECTION, SOLUTION INTRAVENOUS at 07:39

## 2022-01-01 RX ADMIN — PANTOPRAZOLE SODIUM 40 MG: 40 INJECTION, POWDER, FOR SOLUTION INTRAVENOUS at 21:52

## 2022-01-01 RX ADMIN — LEVALBUTEROL HYDROCHLORIDE 1.25 MG: 1.25 SOLUTION RESPIRATORY (INHALATION) at 08:42

## 2022-01-01 RX ADMIN — CEFEPIME HYDROCHLORIDE 1000 MG: 2 INJECTION, POWDER, FOR SOLUTION INTRAVENOUS at 14:32

## 2022-01-01 RX ADMIN — IPRATROPIUM BROMIDE 0.5 MG: 0.5 SOLUTION RESPIRATORY (INHALATION) at 08:42

## 2022-01-01 RX ADMIN — CEFEPIME HYDROCHLORIDE 1000 MG: 2 INJECTION, POWDER, FOR SOLUTION INTRAVENOUS at 01:48

## 2022-01-01 RX ADMIN — LEVALBUTEROL HYDROCHLORIDE 1.25 MG: 1.25 SOLUTION RESPIRATORY (INHALATION) at 08:30

## 2022-01-01 RX ADMIN — POTASSIUM CHLORIDE 20 MEQ: 14.9 INJECTION, SOLUTION INTRAVENOUS at 18:31

## 2022-01-01 RX ADMIN — ACETAMINOPHEN 650 MG: 650 SUSPENSION ORAL at 17:00

## 2022-01-01 RX ADMIN — PANTOPRAZOLE SODIUM 40 MG: 40 INJECTION, POWDER, FOR SOLUTION INTRAVENOUS at 21:05

## 2022-01-01 RX ADMIN — CEFEPIME HYDROCHLORIDE 1000 MG: 2 INJECTION, POWDER, FOR SOLUTION INTRAVENOUS at 01:58

## 2022-01-01 RX ADMIN — Medication 250 ML: at 15:10

## 2022-01-01 RX ADMIN — HALOPERIDOL LACTATE 0.5 MG: 5 INJECTION, SOLUTION INTRAMUSCULAR at 13:43

## 2022-01-01 RX ADMIN — THIAMINE HYDROCHLORIDE 100 MG: 100 INJECTION, SOLUTION INTRAMUSCULAR; INTRAVENOUS at 08:09

## 2022-01-01 RX ADMIN — HALOPERIDOL LACTATE 5 MG: 5 INJECTION, SOLUTION INTRAMUSCULAR at 01:06

## 2022-01-01 RX ADMIN — IODIXANOL 100 ML: 320 INJECTION, SOLUTION INTRAVASCULAR at 14:56

## 2022-01-01 RX ADMIN — VANCOMYCIN HYDROCHLORIDE 1500 MG: 5 INJECTION, POWDER, LYOPHILIZED, FOR SOLUTION INTRAVENOUS at 13:35

## 2022-01-01 RX ADMIN — POTASSIUM CHLORIDE 20 MEQ: 200 INJECTION, SOLUTION INTRAVENOUS at 04:27

## 2022-01-01 RX ADMIN — CEFEPIME HYDROCHLORIDE 1000 MG: 2 INJECTION, POWDER, FOR SOLUTION INTRAVENOUS at 13:52

## 2022-01-01 RX ADMIN — PANTOPRAZOLE SODIUM 40 MG: 40 INJECTION, POWDER, FOR SOLUTION INTRAVENOUS at 20:38

## 2022-01-01 RX ADMIN — DEXTROSE MONOHYDRATE 250 ML: 100 INJECTION, SOLUTION INTRAVENOUS at 15:10

## 2022-01-01 RX ADMIN — THIAMINE HYDROCHLORIDE 500 MG: 100 INJECTION, SOLUTION INTRAMUSCULAR; INTRAVENOUS at 17:22

## 2022-01-01 RX ADMIN — THIAMINE HYDROCHLORIDE 500 MG: 100 INJECTION, SOLUTION INTRAMUSCULAR; INTRAVENOUS at 07:46

## 2022-01-01 RX ADMIN — PANTOPRAZOLE SODIUM 40 MG: 40 INJECTION, POWDER, FOR SOLUTION INTRAVENOUS at 08:09

## 2022-01-01 RX ADMIN — OCTREOTIDE ACETATE 50 MCG: 100 INJECTION, SOLUTION INTRAVENOUS; SUBCUTANEOUS at 15:24

## 2022-01-01 RX ADMIN — IPRATROPIUM BROMIDE 0.5 MG: 0.5 SOLUTION RESPIRATORY (INHALATION) at 13:15

## 2022-01-01 RX ADMIN — HEPARIN SODIUM 5000 UNITS: 5000 INJECTION INTRAVENOUS; SUBCUTANEOUS at 05:00

## 2022-01-01 RX ADMIN — PANTOPRAZOLE SODIUM 40 MG: 40 INJECTION, POWDER, FOR SOLUTION INTRAVENOUS at 08:30

## 2022-01-01 RX ADMIN — DEXTROSE MONOHYDRATE 250 ML: 100 INJECTION, SOLUTION INTRAVENOUS at 13:39

## 2022-01-01 RX ADMIN — DEXTROSE AND SODIUM CHLORIDE 125 ML/HR: 5; .9 INJECTION, SOLUTION INTRAVENOUS at 15:03

## 2022-01-01 RX ADMIN — IPRATROPIUM BROMIDE 0.5 MG: 0.5 SOLUTION RESPIRATORY (INHALATION) at 08:15

## 2022-01-01 RX ADMIN — POTASSIUM CHLORIDE 20 MEQ: 200 INJECTION, SOLUTION INTRAVENOUS at 00:19

## 2022-01-01 RX ADMIN — HEPARIN SODIUM 5000 UNITS: 5000 INJECTION INTRAVENOUS; SUBCUTANEOUS at 13:40

## 2022-01-01 RX ADMIN — IPRATROPIUM BROMIDE 0.5 MG: 0.5 SOLUTION RESPIRATORY (INHALATION) at 14:29

## 2022-01-01 RX ADMIN — HEPARIN SODIUM 5000 UNITS: 5000 INJECTION INTRAVENOUS; SUBCUTANEOUS at 23:00

## 2022-01-01 RX ADMIN — PANTOPRAZOLE SODIUM 40 MG: 40 INJECTION, POWDER, FOR SOLUTION INTRAVENOUS at 21:31

## 2022-01-01 RX ADMIN — CEFTRIAXONE SODIUM 2000 MG: 10 INJECTION, POWDER, FOR SOLUTION INTRAVENOUS at 15:26

## 2022-01-01 RX ADMIN — HEPARIN SODIUM 5000 UNITS: 5000 INJECTION INTRAVENOUS; SUBCUTANEOUS at 21:17

## 2022-01-01 RX ADMIN — Medication 20 G: at 00:50

## 2022-01-01 RX ADMIN — IPRATROPIUM BROMIDE 0.5 MG: 0.5 SOLUTION RESPIRATORY (INHALATION) at 21:25

## 2022-01-01 RX ADMIN — SODIUM CHLORIDE 125 ML/HR: 234 INJECTION, SOLUTION INTRAVENOUS at 14:22

## 2022-01-01 RX ADMIN — HALOPERIDOL LACTATE 0.5 MG: 5 INJECTION, SOLUTION INTRAMUSCULAR at 10:39

## 2022-01-01 RX ADMIN — DEXTROSE MONOHYDRATE 250 ML: 100 INJECTION, SOLUTION INTRAVENOUS at 19:23

## 2022-01-01 RX ADMIN — IPRATROPIUM BROMIDE 0.5 MG: 0.5 SOLUTION RESPIRATORY (INHALATION) at 13:59

## 2022-01-01 RX ADMIN — SODIUM CHLORIDE 70 ML/HR: 234 INJECTION, SOLUTION INTRAVENOUS at 01:49

## 2022-01-01 RX ADMIN — THIAMINE HYDROCHLORIDE 500 MG: 100 INJECTION, SOLUTION INTRAMUSCULAR; INTRAVENOUS at 12:53

## 2022-01-01 RX ADMIN — SODIUM CHLORIDE 100 ML/HR: 234 INJECTION, SOLUTION INTRAVENOUS at 08:34

## 2022-01-01 RX ADMIN — HEPARIN SODIUM 5000 UNITS: 5000 INJECTION INTRAVENOUS; SUBCUTANEOUS at 22:31

## 2022-01-01 RX ADMIN — PANTOPRAZOLE SODIUM 40 MG: 40 INJECTION, POWDER, FOR SOLUTION INTRAVENOUS at 22:20

## 2022-01-01 RX ADMIN — LIDOCAINE 5% 1 PATCH: 700 PATCH TOPICAL at 17:22

## 2022-01-01 RX ADMIN — HALOPERIDOL LACTATE 0.5 MG: 5 INJECTION, SOLUTION INTRAMUSCULAR at 17:13

## 2022-01-01 RX ADMIN — IPRATROPIUM BROMIDE 0.5 MG: 0.5 SOLUTION RESPIRATORY (INHALATION) at 20:42

## 2022-01-01 RX ADMIN — HEPARIN SODIUM 5000 UNITS: 5000 INJECTION INTRAVENOUS; SUBCUTANEOUS at 13:51

## 2022-01-01 RX ADMIN — LEVALBUTEROL HYDROCHLORIDE 1.25 MG: 1.25 SOLUTION RESPIRATORY (INHALATION) at 13:59

## 2022-01-01 RX ADMIN — HEPARIN SODIUM 5000 UNITS: 5000 INJECTION INTRAVENOUS; SUBCUTANEOUS at 06:03

## 2022-01-01 RX ADMIN — THIAMINE HYDROCHLORIDE 500 MG: 100 INJECTION, SOLUTION INTRAMUSCULAR; INTRAVENOUS at 20:54

## 2022-01-01 RX ADMIN — HEPARIN SODIUM 5000 UNITS: 5000 INJECTION INTRAVENOUS; SUBCUTANEOUS at 13:52

## 2022-01-01 RX ADMIN — DEXTROSE, SODIUM CHLORIDE, AND POTASSIUM CHLORIDE 75 ML/HR: 5; .45; .15 INJECTION INTRAVENOUS at 15:41

## 2022-01-01 RX ADMIN — DEXTROSE 100 ML/HR: 10 SOLUTION INTRAVENOUS at 06:30

## 2022-01-01 RX ADMIN — IPRATROPIUM BROMIDE 0.5 MG: 0.5 SOLUTION RESPIRATORY (INHALATION) at 13:08

## 2022-01-01 RX ADMIN — HEPARIN SODIUM 5000 UNITS: 5000 INJECTION INTRAVENOUS; SUBCUTANEOUS at 13:57

## 2022-01-01 RX ADMIN — HALOPERIDOL LACTATE 2 MG: 5 INJECTION, SOLUTION INTRAMUSCULAR at 22:17

## 2022-01-01 RX ADMIN — CEFTRIAXONE SODIUM 2000 MG: 10 INJECTION, POWDER, FOR SOLUTION INTRAVENOUS at 14:53

## 2022-01-01 RX ADMIN — VANCOMYCIN HYDROCHLORIDE 1500 MG: 5 INJECTION, POWDER, LYOPHILIZED, FOR SOLUTION INTRAVENOUS at 16:58

## 2022-01-01 RX ADMIN — DEXTROSE 100 ML: 10 SOLUTION INTRAVENOUS at 17:09

## 2022-01-01 RX ADMIN — DEXTROSE 125 ML/HR: 10 SOLUTION INTRAVENOUS at 14:57

## 2022-01-01 RX ADMIN — DEXTROSE 75 ML/HR: 10 SOLUTION INTRAVENOUS at 20:44

## 2022-01-01 RX ADMIN — DEXTROSE MONOHYDRATE 250 ML: 100 INJECTION, SOLUTION INTRAVENOUS at 19:17

## 2022-01-01 RX ADMIN — LEVALBUTEROL HYDROCHLORIDE 1.25 MG: 1.25 SOLUTION RESPIRATORY (INHALATION) at 19:40

## 2022-01-01 RX ADMIN — POTASSIUM CHLORIDE 20 MEQ: 200 INJECTION, SOLUTION INTRAVENOUS at 13:40

## 2022-01-01 RX ADMIN — LEVALBUTEROL HYDROCHLORIDE 1.25 MG: 1.25 SOLUTION RESPIRATORY (INHALATION) at 20:42

## 2022-01-01 RX ADMIN — SODIUM CHLORIDE, SODIUM LACTATE, POTASSIUM CHLORIDE, AND CALCIUM CHLORIDE 500 ML: .6; .31; .03; .02 INJECTION, SOLUTION INTRAVENOUS at 18:22

## 2022-01-01 RX ADMIN — HEPARIN SODIUM 5000 UNITS: 5000 INJECTION INTRAVENOUS; SUBCUTANEOUS at 06:14

## 2022-01-01 RX ADMIN — SODIUM CHLORIDE 125 ML/HR: 234 INJECTION, SOLUTION INTRAVENOUS at 22:04

## 2022-01-01 RX ADMIN — HEPARIN SODIUM 5000 UNITS: 5000 INJECTION INTRAVENOUS; SUBCUTANEOUS at 05:23

## 2022-01-04 NOTE — PROGRESS NOTES
[address], [address] [place] [address], [address] [place] [address]  [phone]    NAME: Negra Jung  AGE: 81 y o  SEX: male    Progress Note    Location:   POS: 32 (SNF)    Assessment/Plan:    Antral ulcer  Stable  Patient denies any GI related concerns  Continue Pantoprazole 40mg BID    MG (myasthenia gravis) ([address] )  Followed by Neurology as out-patient  Stage 3 chronic kidney disease (HCC)  Recent JUDITH while in-patient  Recent Renal functions slightly elevated (12/23/2022):  Crea: 1 67 / BUN: 42 / eGFR: 37  Start bladder scan Q4 hours x 24 hours ( to check for retention)  CBC with diff and BMP on 1/5/2022  Continue to avoid hypotensive episode and use of nephrotoxic medication  Continue to monitor hydration while in [facility]    Ambulatory dysfunction  Continue PT/OT as scheduled  Fall precaution    Benign prostatic hyperplasia  No  related symptoms presented  Continue Oxybutynin 5mg daily    Pancreatic lesion  Deemed stable on last imaging  To follow-up with PCP upon discharge      Chief complaint / Reason for visit: Follow-up visit    History of Present Illness: This is a 81 y o  Male patient currently admitted at [facility] (12/21/2021 to present) following discharge from acute care hospitalization [facility]) with Dx of Acute Antral Ulceration - S/P EGD, JUDITH, Pancreatic cystic lesion and ambulatory dysfunction  Patient is seen and examined today to follow-up acute and chronic medical conditions as mentioned above and MG, CKD3 and BPH  Patient is in bed - awake, alert, cooperative, calm and pleasant  Patient is verbal with clear coherent speech - oriented to name/birthday/ current date and place  Patient expressed worry with regards to suspended driving privileges on this visit - otherwise no other acute medical concerns during ROS assessment  Per nursing, no acute medical concerns for this visit      Review of Systems:  Per history of present illness, all other systems reviewed and negative  HISTORY:  Medical Hx: Reviewed, unchanged  Family Hx: Reviewed, unchanged  Soc Hx: Reviewed,  unchanged    ALLERGY: Reviewed, unchanged  No Known Allergies     PHYSICAL EXAM:  Vital Signs: T98 2F -P68 -R17 BP: 132/70 SpO2: 97% RA  Weight: 160 0 lbs (12/21/2021)    General: NAD  Head: Atraumatic  Normocephalic  Ear: Hard of hearing - has B/L hearing aids  Eye Exam: anicteric sclera, no discharge, PERRLA, No injection  Oral Exam: moist mucous membranes, no buccaloropharyngeal erythema, palatine tonsils WNL  Neck Exam: no anterior cervical lymphadenopathy noted, neck supple  Cardiovascular: regular rate, irregular rhythm, no murmurs, rubs, or gallops  Pulmonary: no wheeze, no rhonchi, no rales  No chest tenderness  Abdominal: soft, non-tender, nondistended, bowel sounds audible x 4 quadrants  Ave meal completion: 50-75%  : Non distended bladder  Continent  Last documented BM: 12/30/2021  Patient self toilets  Extremities and skin: no edema noted, no rashes  Intact skin  Resolving ecchymosis to Right hand  Neurological: alert, cooperative and responsive, Oriented x 3, moving all 4 extremities symmetrically      Laboratory results / Imaging reviewed: Hard copy/ies in medical chart:    * BMP (12/23/2021):   GLUCOSE 78 mg/dL 65-99  Final              BUN 42 mg/dL 7-28 H Final             CREATININE 1 67 mg/dL 0 53-1 30 H Final             SODIUM 145 mmol/L 135-145  Final             POTASSIUM 3 7 mmol/L 3 5-5 2  Final             CHLORIDE 115 mmol/L 100-109 H Final             CARBON DIOXIDE 24 mmol/L 23-31  Final             CALCIUM 8 9 mg/dL 8 5-10 1  Final             ANION GAP 6  3-11  Final             eGFR, NON  AM 37  >60 L Final             eGFR,  AMER 43  >60 L Final     * CBC w/o diff (12/23/2021):   HEMOGLOBIN 9 6 g/dL 12 5-17 0 L Final              HEMATOCRIT 26 7 % 37 0-48 0 L Final             WBC 4 0 thou/cmm 4 0-10 5  Final             RBC 2 84 mill/cmm 4 00-5 40 L Final        PLATELET COUNT 169 thou/cmm 140-350  Final             MPV 10 0 fL 7 5-11 3  Final             MCV 94 fL   Final             MCH 33 7 pg 27 0-36 0  Final             MCHC 35 8 g/dL 32 0-37 0  Final             RDW 15 0 % 12 0-16 0  Final       Current Medications: All medications reviewed and updated in Nursing Home Chart    CURRENT MEDICATION LIST IN OO-STR:    Current Outpatient Medications:     amLODIPine (NORVASC) 5 mg tablet, Take 5 mg by mouth daily, Disp: , Rfl:     Diclofenac Sodium (VOLTAREN) 1 %, Apply 2 g topically daily To B/L UE, Disp: , Rfl:     Doxepin HCl 6 MG TABS, Take 6 mg by mouth daily as needed, Disp: , Rfl:     ergocalciferol (ERGOCALCIFEROL) 1 25 MG (09994 UT) capsule, Take 50,000 Units by mouth once a week Every Monday, Disp: , Rfl:     gabapentin (NEURONTIN) 300 mg capsule, Take 300 mg by mouth daily at bedtime, Disp: , Rfl:     ipratropium-albuterol (DUO-NEB) 0 5-2 5 mg/3 mL nebulizer solution, Take 3 mL by nebulization 4 (four) times a day, Disp: , Rfl:     ipratropium-albuterol (DUO-NEB) 0 5-2 5 mg/3 mL nebulizer solution, Take 3 mL by nebulization every 6 (six) hours as needed, Disp: , Rfl:     Multiple Vitamins-Minerals (PreserVision AREDS) TABS, Take 1 tablet by mouth daily, Disp: , Rfl:     oxybutynin (DITROPAN) 5 mg tablet, Take 5 mg by mouth daily, Disp: , Rfl:     pantoprazole (PROTONIX) 40 mg tablet, Take 40 mg by mouth 2 (two) times a day, Disp: , Rfl:     sucralfate (CARAFATE) 1 g tablet, Take 1 g by mouth 4 (four) times a day, Disp: , Rfl:     terazosin (HYTRIN) 10 MG capsule, Take 10 mg by mouth daily at bedtime, Disp: , Rfl:       Please note:  Voice-recognition software may have been used in the preparation of this document  Occasional wrong word or "sound-alike" substitutions may have occurred due to the inherent limitations of voice recognition software  Interpretation should be guided by context      JOSE Cali  1/5/2022

## 2022-01-05 NOTE — ASSESSMENT & PLAN NOTE
· Follows with Neurology as OP  · Received IVIG cycles  · Continue PT/OT at [facility]  · Fall Precautions

## 2022-01-05 NOTE — ASSESSMENT & PLAN NOTE
· JUDITH while inpatient - received gentle IVF  · BUN/Crt 42/1 67- stable  · Monitor with PCP at d/c from SNF  · Avoid hypotension  · Avoid NSAIDS  · Renal dose medications

## 2022-01-05 NOTE — ASSESSMENT & PLAN NOTE
Recent JUDITH while in-patient  Recent Renal functions slightly elevated (12/23/2022):  Crea: 1 67 / BUN: 42 / eGFR: 37  Start bladder scan Q4 hours x 24 hours ( to check for retention)  CBC with diff and BMP on 1/5/2022  Continue to avoid hypotensive episode and use of nephrotoxic medication  Continue to monitor hydration while in STR

## 2022-01-05 NOTE — ASSESSMENT & PLAN NOTE
· Patient states he still feels weak  · Good Bed mobility- having difficulty with transfers and ambulation  · Continue PT/OT  · Fall Precautions  ·  following for d/c plan- potential LTC here vs Family to take home

## 2022-01-19 NOTE — TELEPHONE ENCOUNTER
New Patient Intake Form   Patient Details:    Cynthia Whitehead  1935  [id number]    Appointment Information   Who is calling to schedule? [place]    If not self, what is the caller's name? Please put name of [facility] nurse as well  Jerald Maurer    Referring provider Self     What is the diagnosis? myasthenia gravis   Is there a confirmed tissue diagnosis? No   Is patient aware of diagnosis? Yes    Have you had any imaging or labs done? If yes, where? (If imaging done outside of [facility], please remind patient to bring a disk ) Yes    Have you been seen by another Oncologist/Hematologist?  If so, who and where? Jerald Maurer is unsure of the doctors name    Are the records in [facility] or Care Everywhere? Yes    Are records needed from an outside facility? yes   If yes, Name of facility, city and state where facility is located  [place] [employer]   Is the patient willing to be seen by another provider?   (This is for breast patients only)    Miscellaneous Information:

## 2022-02-04 NOTE — TELEPHONE ENCOUNTER
Patient states he goes to [facility] for his blood work but when he went on 1/25 they only ana 1 tube when they normally draw 2  Only a CA19 9 is resulted  CBC/CMP drawn today with port access  Awaiting results  Tried calling the patient at home number but the call cannot be completed as dialed  Wanted to confirm with him that he is using his [place] insurance for his consult appt  on 2/10/22 w/ Dr Holli Hernandez  Spoke to [place], [place] at the [place]  She took down information for the referral  She said that the Primary Care doctor will need to approve the request and if it is approved, [employer]  will process it and reach out to the facility for more information

## 2022-02-07 NOTE — TELEPHONE ENCOUNTER
Spoke to PADMINI WHELAN from [place] regarding obtaining a referral for this patient's consult appt  On 2/10/22 w/ Dr Gelacio Guerrero  She was not sure if pts  [facility] P[facility] will [facility] a referral as the [place] has its own Hematology Oncology dept

## 2022-02-08 NOTE — TELEPHONE ENCOUNTER
Called the [facility] to followup on referral for patient's apptointment on 2/10/22 and was told that Jacques Jimenes was still working on it  She will fax it to us when it's completed

## 2022-02-11 NOTE — TELEPHONE ENCOUNTER
I called and left a v/m for Imelda to return my call about an appt for Joelle Bruce with Dr Bibi Pace Neurologist on 02/16/22   I left my name and number

## 2022-02-15 NOTE — TELEPHONE ENCOUNTER
----- Message from Ramón Mccartney sent at 2/15/2022  9:17 AM EST -----  Regarding: ADD ON  Patient is a add on for Dr Mike Bauer for tomorrow at 8 am

## 2022-02-15 NOTE — TELEPHONE ENCOUNTER
[facility], spoke to Rick Couch and Yuli Zamora 3 separate times trying to check on referral  Was given a number that routed back to the main line, called again got transferred over only for the phone to hang up  Tried again with the same results

## 2022-02-16 NOTE — PROGRESS NOTES
Patient ID: Khari De Los Santos is a 81 y o  male  Assessment/Plan:    MG (myasthenia gravis) ([facility] )  This is an  60-year-old gentleman who was diagnosed with myasthenia gravis predominantly ocular prior to 2011  Acetylcholine levels are unavailable today  He has been tried on various different medications and was following at Neurology [address]  He has been receiving IVIG every 4 to 6 weeks at 1 gram/kilogram but had not received a since his hospitalization  He does report diffuse fatigue but his neck flexion extension were normal   Today's mg ADLs score was two  We discussed his various medications and his current symptoms  I have asked him to try Mestinon 30 mg t i d  As he does also complain of severe constipation  This may help his myasthenic symptoms and also his constipation  If he does have side effects or if he notices this is ineffective we can try IVIG as an outpatient  The dose would probably be 1 gram/kilogram over three days lasting over 4 hours with 250 cc of IV fluids prior given his prior kidney dysfunction  He currently lives in the skilled nursing facility  We can discuss at upcoming visit regarding the use of Imuran and or the use of Soliris or by Vyvgart    I have asked him to obtain acetylcholine receptor antibodies and Anti-MuSK as well as a CT scan of the chest to determine any evidence of a thymoma  He is to return to our offices in several months         Diagnoses and all orders for this visit:    MG (myasthenia gravis) ([facility] )  -     Acetylcholine Receptor (AChR) Binding Antibodies with Reflex to MuSK Antibodies; Future  -     CT chest wo contrast; Future  -     Comprehensive metabolic panel; Future  -     CBC and Platelet; Future    Other orders  -     Discontinue: Multiple Vitamins-Minerals (ICAPS AREDS 2 PO); Take 1 tablet by mouth 2 (two) times a day  -     Calcium Carbonate-Vitamin D 600-400 MG-UNIT per tablet;  Take by mouth  -     cyanocobalamin (VITAMIN B-12) 100 MCG tablet; Take by mouth  -     esomeprazole (NexIUM) 40 mg packet         Subjective: This is an 81 gentleman right-handed who is a  resident skilled nursing facility  He has a known history of myasthenia gravis and has been following with  uziel Murillo with Dr Caprice Serrano center since 2014   he was on Mestinon which caused GI side effects  This occurred a 60 mg t i d  And this was discontinued  His major symptoms included ocular weakness  He then was tried on prednisone and CellCept and fortunately both resulted in side effects  He was last evaluated at neurology [facility] more than three years ago  He does receive IVIG every 4 to 6 weeks which has been helpful  His last IVIG was in November 2021   In the interim he was subsequently admitted to the hospital due to GI complaints  Presented to the [facility] with c/o 2 week worsening weakness and loss of appetite  He developed coffee ground emesis- s/p EDG showing antral ulceration- started on PPI and carafate  Patient had JUDITH s/p gentle IVF with improvement  CT abdomen revealed a concerning pancreatic mass and an MRI was done to reveal 5 mm pancreatic cystic lesion- no change from prior imaging per records  Soumya Stevensonvis He is doing better but is now in a skilled nursing facility  Prior to that he was living at home  Today he presents from a skilled nursing facility  He is hard of hearing and has bilateral hearing aids  He was unable to provide a great deal of history and history is obtained from the patient's chart  Mestinon 60 mg t i d   but caused GI his symptoms suggest diarrhea  He does admit that he would like IVIG therapy and notices diffuse fatigue  Other complaints today include constipation  Today's mg at ADLs score was two    MG; Dx prior to 2011   Seen by dr Pearson Friday , dr Doni East and dr Emry Kocher   Antibodies: ?    Musk ?  cts can of chest ?     Tried Mestinon in 2014 but this resulted in GI symptoms such as diarrhea    Was given prednisone was up to 20 mg a day but developed side effects and tapered off in 2015     Was on CellCept but developed side effects with shortness of breath, lower extremity swelling arthralgias  This was tapered  He has been receiving IVIG monotherapy every 4 to 6 weeks 1 gram/kilograms     The past he was also treated with plasmapheresis    The history he currently is a resident of [facility] nursing [facility]  This is not a smoker during the previously worked as a    He is     He admits to abnormalities in the right shoulder due to rotator cuff problem    A CBC and a CMP in January 2022 was normal             The following portions of the patient's history were reviewed and updated as appropriate:   He  has a past medical history of Cancer ([facility] ), Kidney disease, SYED (obstructive sleep apnea), Prostate cancer ([facility] ), and Rotator cuff rupture (10/20/2006)  He  has no past surgical history on file  His family history includes Cancer in his father; Diabetes in his mother  He  reports that he has quit smoking  His smoking use included pipe  He has quit using smokeless tobacco  He reports that he does not drink alcohol and does not use drugs    Current Outpatient Medications   Medication Sig Dispense Refill    amLODIPine (NORVASC) 5 mg tablet Take 5 mg by mouth daily      Calcium Carbonate-Vitamin D 600-400 MG-UNIT per tablet Take by mouth      Diclofenac Sodium (VOLTAREN) 1 % Apply 2 g topically daily To B/L UE      ergocalciferol (ERGOCALCIFEROL) 1 25 MG (59870 UT) capsule Take 50,000 Units by mouth once a week Every Monday      esomeprazole (NexIUM) 40 mg packet       gabapentin (NEURONTIN) 300 mg capsule Take 300 mg by mouth daily at bedtime      ipratropium-albuterol (DUO-NEB) 0 5-2 5 mg/3 mL nebulizer solution Take 3 mL by nebulization 4 (four) times a day      ipratropium-albuterol (DUO-NEB) 0 5-2 5 mg/3 mL nebulizer solution Take 3 mL by nebulization every 6 (six) hours as needed      Multiple Vitamins-Minerals (PreserVision AREDS) TABS Take 1 tablet by mouth daily      oxybutynin (DITROPAN) 5 mg tablet Take 5 mg by mouth daily      pantoprazole (PROTONIX) 40 mg tablet Take 40 mg by mouth 2 (two) times a day      sucralfate (CARAFATE) 1 g tablet Take 1 g by mouth 4 (four) times a day      terazosin (HYTRIN) 10 MG capsule Take 10 mg by mouth daily at bedtime      cyanocobalamin (VITAMIN B-12) 100 MCG tablet Take by mouth      Doxepin HCl 6 MG TABS Take 6 mg by mouth daily as needed (Patient not taking: Reported on 2/16/2022 )       No current facility-administered medications for this visit  He has No Known Allergies                Objective:    Blood pressure 142/72, pulse 74, temperature (!) 95 9 °F (35 5 °C), temperature source Tympanic, resp  rate 16, SpO2 96 %  Physical Exam  Cardiovascular:      Rate and Rhythm: Normal rate and regular rhythm  Pulses: Normal pulses  Heart sounds: Normal heart sounds  Musculoskeletal:      Cervical back: Normal range of motion  Neurological:      Mental Status: He is alert  Deep Tendon Reflexes:      Reflex Scores:       Tricep reflexes are 1+ on the right side and 1+ on the left side  Bicep reflexes are 1+ on the right side and 1+ on the left side  Patellar reflexes are 1+ on the right side and 1+ on the left side  Achilles reflexes are 1+ on the right side and 1+ on the left side  Neurological Exam  Mental Status  Alert  Oriented only to person and place  Language is fluent with no aphasia  He Was hard of hearing and bilateral hearing aids in place   Cranial Nerves  CN VII:  Right: There is central facial weakness  CN VIII:  Right: Hearing is decreased  Left: Hearing is decreased  CN IX, X:  Right: Palate is normal   CN XI: Shoulder shrug strength is normal   CN XII: Tongue midline without atrophy or fasciculations    Double with suerperiro gaze, msytaturm toward the elft, vaughn dsuepriroa dnd l fatlatied on rith nfla and adand  , hearing aided both ear   Motor  Normal muscle bulk throughout  No fasciculations present  Strength is 5/5 in all four extremities except as noted  Right/proximal extremity weakness  His neck flexion and neck extension were normal   He was able to stand without any assistance but with the use of the sides  He did not a sign  No fasciculations were noted   Sensory  Light touch is normal in upper and lower extremities  Temperature is normal in upper and lower extremities  Reflexes                                           Right                      Left  Biceps                                 1+                         1+  Triceps                                1+                         1+  Patellar                                1+                         1+  Achilles                                1+                         1+  Plantar                           Downgoing                Downgoing    Right pathological reflexes: Devika's absent  Left pathological reflexes: Devika's absent  Coordination  Right: Finger-to-nose normal   Left: Finger-to-nose normal     Gait  Presented in a wheelchair but he was able to stand independently with using the signs  Was unable to tandem gait  He did not a Romberg sign       Review of systems was obtained the patient    ROS:    Review of Systems   Unable to perform ROS: Other   Constitutional: Positive for fatigue  HENT: Negative  Eyes: Negative  Respiratory: Negative  Cardiovascular: Negative  Gastrointestinal: Positive for constipation  Endocrine: Negative  Genitourinary: Negative  Musculoskeletal: Positive for gait problem and myalgias  Allergic/Immunologic: Negative  Neurological: Positive for weakness  Psychiatric/Behavioral: Negative

## 2022-02-16 NOTE — ASSESSMENT & PLAN NOTE
This is an  50-year-old gentleman who was diagnosed with myasthenia gravis predominantly ocular prior to 2011  Acetylcholine levels are unavailable today  He has been tried on various different medications and was following at Neurology [address]  He has been receiving IVIG every 4 to 6 weeks at 1 gram/kilogram but had not received a since his hospitalization  He does report diffuse fatigue but his neck flexion extension were normal   Today's mg ADLs score was two  We discussed his various medications and his current symptoms  I have asked him to try Mestinon 30 mg t i d  As he does also complain of severe constipation  This may help his myasthenic symptoms and also his constipation  If he does have side effects or if he notices this is ineffective we can try IVIG as an outpatient  The dose would probably be 1 gram/kilogram over three days lasting over 4 hours with 250 cc of IV fluids prior given his prior kidney dysfunction  He currently lives in the skilled nursing facility  We can discuss at upcoming visit regarding the use of Imuran and or the use of Soliris or by Vyvgart    I have asked him to obtain acetylcholine receptor antibodies and Anti-MuSK as well as a CT scan of the chest to determine any evidence of a thymoma    He is to return to our offices in several months

## 2022-02-21 NOTE — TELEPHONE ENCOUNTER
I spoke to Kurtis   I have informed her that Mestinon may have caused the symptoms on Friday however at most the side effects would last 24 hr but he would not have slurred speech today ( 72 hours later) due to Mestinon  The Mestinon has been on hold  I have asked her to discontinue it completely    We will work on IVIG

## 2022-02-21 NOTE — TELEPHONE ENCOUNTER
Nurse Pippa Emerson from [address] [place] [address] [address] calling back  Stated pt's Mestinon has been held since Friday  Per report from NP, pt took one dose of Mestinon 30 mg and then said he couldn't see, had slurred speech, and couldn't lift his arms B/L    Pt denied any trouble swallowing  Since holding medication pt is basically back to baseline other than still having some slurred speech  Juany Montana, ANUSHA is looking for your recommendations  She is available until 5 pm today or 8am tomorrow again      # [phone]

## 2022-02-21 NOTE — TELEPHONE ENCOUNTER
Eugene'shira VM from Ashley Guevara, Nurse Practioner at [facility] regarding patient and Mestinon medication  Tried calling back # [phone], left a VM for CB to our office

## 2022-02-23 NOTE — PROGRESS NOTES
[address] [facility]  [address]  [phone]  [place]  Code 32 ([facility])        NAME: Uche Garcia  AGE: 81 y o  SEX: male CODE STATUS: No CPR    DATE OF ENCOUNTER: 2/23/2022    Assessment and Plan     1  Benign prostatic hyperplasia, unspecified whether lower urinary tract symptoms present  Assessment & Plan:  · Denies symptoms on exam   · Patient is having soft BP's  · Decrease Terazosin to 5 mg QHS  · Due to Hx of Myasthenia Gravis - use of Oxybutynin is contraindicated - also would avoid use in elderly   · D/C Oxybutynin      2  Malignant neoplasm of prostate [facility])  Assessment & Plan:  · Denies any symptoms currently   · Follow with Urology as OP as scheduled       3  Stage 3b chronic kidney disease ([facility] )  Assessment & Plan:  · Baseline creatinine appears to be between 1 4-1 6  · Last BMP obtained Jan 5, 2021 stable   · Due to recent c/o of poor appetite and hypotension - obtain CMP, CBCD and Mg level on Friday 2/25/22  · Avoid hypotension  · Avoid NSAIDS  · Renal dose medications       4  MG (myasthenia gravis) ([facility] )  Assessment & Plan:  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   · Received IVIG cycles in past but has not received since his hospitalization  · He continues with c/o fatigue and some neck tightness  · He was started on Mestinon 30 mg t i d  However he did develop change/worsening vision and this was stopped due to these complaints  · Per Neurology if Mestinon does not help they can try IVIG as an outpatient  These would be 4 hour treatments over 3 days  · Neuro also recommend:   · " obtain acetylcholine receptor antibodies and Anti-MuSK as well as a CT scan of the chest to determine any evidence of a thymoma  He is to return to our offices in several months "       5  Pancreatic lesion  Assessment & Plan:  · While inpatient at [facility] he had a CT scan abdomen was concerning for pancreatic mass  · MRI noted for 5mm cystic lesion not changed from prior  · Continue conservative management at this time       6  Frailty syndrome in geriatric patient  Assessment & Plan:  · Advanced age with multiple co morbidities contributing myasthenia gravis, CKD stage 3, prostate CA  · Patient appears frail and weak  · Today patient states he feels very tired and weak he has refused to take medications per nursing  · His appetite is decreased  · Recommend ADM/palliative care consult  · Called family, had to leave a voicemail for Andrzej Palacios his son awaiting callback to discuss plan of care  · Patient is DNR/DNI         All medications and routine orders were reviewed and updated as needed  Chief Complaint     LTC follow up visit     Past Medical and Surgica History      Past Medical History:   Diagnosis Date    Cancer ([facility] )     Kidney disease     SYED (obstructive sleep apnea)     Prostate cancer ([facility] )     Rotator cuff rupture 10/20/2006     No past surgical history on file  No Known Allergies       History of Present Illness     Jennifer Stewart is a 81year old male admitted to [facility] on 12/21/2021 for STR and later transitioned to LTC  Past Medical Hx including but not limited to Myastenia Gravis, BPH, Prostate CA, Neuropathy, ambulatory dysfunction, Covid 19, seen in collaboration with nursing for medical mgmt and LTC follow up visit  He is also followed/managed by Evaristo Campos NP at [facility]  Seen and examined at bedside  He is very [place] and has poor eyesight  He is able to answer questions appropriately and is AAOx3 on exam  Nursing is concerned about low BP/HR  He was 94/55 and HR of 57  Patient was recently seen by Neurology for his Myastenia gravis and was started on Mestinon but developed decreased vision and increased weakness and this medication was stopped  On exam he states he feels "weak" and has c/o of some mild neck pain that is not new per patient   Review of medications done today, will d/c his oxybutinin due to contraindication with myasthenia gravis and in elderly  Will lower Terazosin dose by half to 5 mg daily which it appears this is given for BPH but due to low BP with weakness will lower dose and monitor vitals  Patient states he has a poor appetite but "i'm trying to eat as much as I can", he states he is drinking water, "when they give it to me, I drink it"  His loss of appetite is not new but worse, that is how he presented to STR  He does have a hx of gastric ulcer and was started on PPI prior as well as Carafate  Patient denies any bowel or bladder issues  Patient denies CP/SOB/N/V/D  Denies lightheadedness, dizziness, headaches, vision changes  Per review of SNF records, Patient is eating 2-3 meals per day, consuming  %  His continence varies, he is having every other day bowel movements since 2/19/2022, Last documented BM 2/21/2022  No other concerns at this time  Spoke with Son, Min Romero on the phone regarding patient's condition and plan of care  We discussed Consulting ADM/Palliative care at [facility] to assist in managing his chronic condition symptoms and to be available for goals of care discussions  We discussed re-starting IVIG treatments and that this will be arranged with the Neurology office  All questions answered and son in agreement with plan  The patient's allergies, past medical, surgical, social and family history were reviewed and unchanged  Review of Systems     Review of Systems   Constitutional: Positive for fatigue  Negative for activity change, appetite change and fever  HENT: Positive for hearing loss  Negative for ear pain, rhinorrhea and trouble swallowing  Eyes: Negative for pain and visual disturbance  Respiratory: Negative for cough, shortness of breath and wheezing  Cardiovascular: Negative for chest pain and palpitations     Gastrointestinal: Negative for abdominal distention, abdominal pain, blood in stool, constipation, diarrhea, nausea and vomiting  Genitourinary: Negative for decreased urine volume, difficulty urinating, dysuria, frequency and hematuria  Musculoskeletal: Positive for gait problem and neck pain  Negative for arthralgias and back pain  Skin: Negative for color change and rash  Neurological: Positive for weakness  Negative for dizziness, syncope, light-headedness, numbness and headaches  Psychiatric/Behavioral: Negative for dysphoric mood and sleep disturbance  Objective     Vitals:   Vitals:    02/23/22 1823   BP: 94/55   Pulse: 57   Resp: 20   Temp: 98 2 °F (36 8 °C)   SpO2: 96%         Physical Exam  Vitals and nursing note reviewed  Constitutional:       General: He is not in acute distress  Comments: Elderly Frail male, sleeping, easily woken, does not appear in any distress    HENT:      Head: Normocephalic and atraumatic  Ears:      Comments: Hard of Hearing      Nose: No congestion or rhinorrhea  Mouth/Throat:      Mouth: Mucous membranes are dry  Eyes:      General: No scleral icterus  Extraocular Movements: Extraocular movements intact  Conjunctiva/sclera: Conjunctivae normal       Pupils: Pupils are equal, round, and reactive to light  Cardiovascular:      Rate and Rhythm: Normal rate and regular rhythm  Pulses: Normal pulses  Heart sounds: Normal heart sounds  No murmur heard  Pulmonary:      Effort: Pulmonary effort is normal       Breath sounds: Normal breath sounds  No wheezing, rhonchi or rales  Comments: Barrel Chest   Abdominal:      General: Bowel sounds are normal  There is no distension  Palpations: Abdomen is soft  Tenderness: There is no abdominal tenderness  Musculoskeletal:         General: No swelling or signs of injury  Lymphadenopathy:      Cervical: No cervical adenopathy  Skin:     General: Skin is warm and dry  Findings: No erythema     Neurological:      Mental Status: He is alert and oriented to person, place, and time  Mental status is at baseline  Sensory: No sensory deficit  Motor: Weakness present  Gait: Gait abnormal       Comments: AAOx3 - forgetful    Psychiatric:         Mood and Affect: Mood normal          Behavior: Behavior normal          Pertinent Laboratory/Diagnostic Studies:   Reviewed in facility chart-stable      Current Medications   Medications reviewed and updated see facility [facility] for details  Current Outpatient Medications:     amLODIPine (NORVASC) 5 mg tablet, Take 5 mg by mouth daily, Disp: , Rfl:     Calcium Carbonate-Vitamin D 600-400 MG-UNIT per tablet, Take by mouth, Disp: , Rfl:     cyanocobalamin (VITAMIN B-12) 100 MCG tablet, Take by mouth, Disp: , Rfl:     Diclofenac Sodium (VOLTAREN) 1 %, Apply 2 g topically daily To B/L UE, Disp: , Rfl:     Doxepin HCl 6 MG TABS, Take 6 mg by mouth daily as needed (Patient not taking: Reported on 2/16/2022 ), Disp: , Rfl:     ergocalciferol (ERGOCALCIFEROL) 1 25 MG (21691 UT) capsule, Take 50,000 Units by mouth once a week Every Monday, Disp: , Rfl:     esomeprazole (NexIUM) 40 mg packet, , Disp: , Rfl:     gabapentin (NEURONTIN) 300 mg capsule, Take 300 mg by mouth daily at bedtime, Disp: , Rfl:     ipratropium-albuterol (DUO-NEB) 0 5-2 5 mg/3 mL nebulizer solution, Take 3 mL by nebulization 4 (four) times a day, Disp: , Rfl:     ipratropium-albuterol (DUO-NEB) 0 5-2 5 mg/3 mL nebulizer solution, Take 3 mL by nebulization every 6 (six) hours as needed, Disp: , Rfl:     Multiple Vitamins-Minerals (PreserVision AREDS) TABS, Take 1 tablet by mouth daily, Disp: , Rfl:     pantoprazole (PROTONIX) 40 mg tablet, Take 40 mg by mouth 2 (two) times a day, Disp: , Rfl:     sucralfate (CARAFATE) 1 g tablet, Take 1 g by mouth 4 (four) times a day, Disp: , Rfl:     terazosin (HYTRIN) 10 MG capsule, Take 5 mg by mouth daily at bedtime , Disp: , Rfl:      Plan discussed with Dr Uche Leggettair noted agreement with assessment and plan  Please note:  Voice-recognition software may have been used in the preparation of this document  Occasional wrong word or "sound-alike" substitutions may have occurred due to the inherent limitations of voice recognition software  Interpretation should be guided by context           JOSE Arrington  2/23/2022  6:03 PM

## 2022-02-23 NOTE — ASSESSMENT & PLAN NOTE
· While inpatient at [facility] he had a CT scan abdomen was concerning for pancreatic mass  · MRI noted for 5mm cystic lesion not changed from prior     · Continue conservative management at this time

## 2022-02-23 NOTE — ASSESSMENT & PLAN NOTE
· Baseline creatinine appears to be between 1 4-1 6  · Last BMP obtained Jan 5, 2021 stable   · Due to recent c/o of poor appetite and hypotension - obtain CMP, CBCD and Mg level on Friday 2/25/22  · Avoid hypotension  · Avoid NSAIDS  · Renal dose medications

## 2022-02-23 NOTE — TELEPHONE ENCOUNTER
Called Deidre Runner and made her aware we will be working on starting IVIG for this patient  Patient will likely have to go to [facility] for [address] [phone] to get updated patient weight  154 3 lbs as of 2/3/22  70 kg / 3 days = 23 33 (adjusts to 22 5g )    [facility] as [facility]  Patient is being seen/treated from our office without a referral from the [facility], therefore they cannot authorize/cover any treatments  If we want to get a referral from [facility] we would need to fax medical records  Fax#: Andrew Cardona #: [phone]    Called and spoke with Danielle Jessica from pre encounter  She states we are okay to send information to the [facility] to obtain authorization  This does not need to be discussed with the patient, it is up to the office to obtain referrals and authorizations  Faisal Reveles states we can just fax therapy plan and office note to the [facility] to obtain authorization       Faxed office note and [facility] plan to [facility] ATTN: Phillip Lopez [phone]    Will call tomorrow to confirm they received this

## 2022-02-23 NOTE — ASSESSMENT & PLAN NOTE
· Denies symptoms on exam   · Patient is having soft BP's  · Decrease Terazosin to 5 mg QHS  · Due to Hx of Myasthenia Gravis - use of Oxybutynin is contraindicated - also would avoid use in elderly   · D/C Oxybutynin

## 2022-02-23 NOTE — ASSESSMENT & PLAN NOTE
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   · Received IVIG cycles in past but has not received since his hospitalization  · He continues with c/o fatigue and some neck tightness  · He was started on Mestinon 30 mg t i d  However he did develop change/worsening vision and this was stopped due to these complaints  · Per Neurology if Mestinon does not help they can try IVIG as an outpatient  These would be 4 hour treatments over 3 days  · Neuro also recommend:   · " obtain acetylcholine receptor antibodies and Anti-MuSK as well as a CT scan of the chest to determine any evidence of a thymoma    He is to return to our offices in several months "

## 2022-02-24 PROBLEM — R54 FRAILTY SYNDROME IN GERIATRIC PATIENT: Status: ACTIVE | Noted: 2022-01-01

## 2022-02-24 NOTE — TELEPHONE ENCOUNTER
[facility]ed [facility] to see if documents were received  Was told that nothing had shown in their system as of yet       Will call back tomorrow

## 2022-02-24 NOTE — ASSESSMENT & PLAN NOTE
· Advanced age with multiple co morbidities contributing myasthenia gravis, CKD stage 3, prostate CA  · Patient appears frail and weak  · Today patient states he feels very tired and weak he has refused to take medications per nursing  · His appetite is decreased  · Recommend ADM/palliative care consult  · Called family, had to leave a voicemail for Kierra Castellano his son awaiting callback to discuss plan of care  · Patient is DNR/DNI

## 2022-02-28 NOTE — TELEPHONE ENCOUNTER
Called the [address] [facility], I was directed to their call center  Call center placed a note to the patient's PCP requesting a referral to [facility]       I was told this can take 24-48 hours to receive

## 2022-03-01 NOTE — TELEPHONE ENCOUNTER
Jerica To from [facility] called stated pt's last lab work showed an elevated BUN and CR  Pt was ordered by Practioner at [facility] to rec've IVF  Pt had a Midline PICC last week  On Sunday evening PICC became infiltrated  Pt had removed and now has a PICC in his R brachial     Nurse was asking if pt can have his IVIG treatments done at [facility] since pt has PICC line? I updated and said our office is waiting to hear from pt's [address]  regarding a referral to our office as per previous documentation  Should take 24 to 48 hours  Per previous encounter, no IVIG can be authorized until referral is authorized to our office      Jerica To said to call update on status of IVIG approval and where pt is to have treatments at #[phone] or # [phone] [address]

## 2022-03-02 PROBLEM — T80.1XXA INTRAVENOUS INFILTRATION: Status: ACTIVE | Noted: 2022-01-01

## 2022-03-02 NOTE — PATIENT INSTRUCTIONS
Orders Placed This Encounter   Procedures    Wound cleansing and dressings     Wound:  Left upper arm  Discontinue previous wound order  Turn and reposition frequently, maximum of every two hours  Instruct / Assist with weight shifting every 15 - 20 minutes when in chair  Increase protein intake  Monitor for any sign of infection or worsening, inform PCP or patient's primary physician in your facility       Standing Status:   Future     Standing Expiration Date:   3/2/2023

## 2022-03-03 NOTE — TELEPHONE ENCOUNTER
Red calling to advise son got a call from the [place] notifying IVIG was approved  I do not see anything in chart regarding approval, but red stating patient is declining quickly  Asking for ivig to be set up urgently

## 2022-03-03 NOTE — TELEPHONE ENCOUNTER
Zohaib Madsen fell and has a skin tear  Please call Ifrah Wallace RN from [address] [place] [address] [phone] Re: Carlos Dick 1935 Jesusprincess Chris has a skin tear    5:44 AM

## 2022-03-03 NOTE — PROGRESS NOTES
[address] MANAGEMENT   AND [facility]PER[facility]IC MED[facility]INE [facility]ENTER       Patient ID: Tim Park is a 81 y o  male Date of Birth 1935     Location of Service: [address] [place] [address]    Performed wound round with: Wound team       Chief Complaint   Patient presents with   [address] [facility] [facility] [address] [facility] Visit     Left upper arm wound       Wound Instructions:  Orders Placed This Encounter   Procedures    Wound cleansing and dressings     Wound:  Left upper arm  Discontinue previous wound order  Turn and reposition frequently, maximum of every two hours  Instruct / Assist with weight shifting every 15 - 20 minutes when in chair  Increase protein intake  Monitor for any sign of infection or worsening, inform PCP or patient's primary physician in your facility  Standing Status:   Future     Standing Expiration Date:   3/2/2023       Allergies  Patient has no known allergies  Assessment & Plan:  1  Intravenous infiltration, initial encounter  Assessment & Plan:  Left arm  - healed  - sign off    Orders:  -     Wound cleansing and dressings; Future           Subjective: This is a 71-year-old male referred to our service for wound on the left upper arm   Patient have a complex medical history including but not limited toMyastenia Gravis, BPH, Prostate CA, Neuropathy, ambulatory dysfunction, Covid 19   Patient was referred by Senior Care Team  Patient was seen in collaboration with the facility wound team      Received patient in bed, seems comfortable  As per patient report, the wound is almost healed  The wound was covered with dry sterile dressing  Patient denies pain  Patient does not have fever  Review of Systems   Constitutional: Negative  HENT: Negative  Eyes: Negative  Respiratory: Negative  Cardiovascular: Negative  Gastrointestinal: Negative  Endocrine: Negative  Genitourinary: Negative  Musculoskeletal: Positive for gait problem  Skin: Positive for wound          See HPI Hematological: Negative  Psychiatric/Behavioral: Negative  All other systems reviewed and are negative  Objective: There were no vitals taken for this visit  Physical Exam  Constitutional:       Appearance: Normal appearance  HENT:      Head: Normocephalic and atraumatic  Ears:      Comments: [place]     Nose: Nose normal       Mouth/Throat:      Mouth: Mucous membranes are moist    Eyes:      Conjunctiva/sclera: Conjunctivae normal    Cardiovascular:      Rate and Rhythm: Normal rate  Pulmonary:      Effort: Pulmonary effort is normal    Abdominal:      Tenderness: There is no abdominal tenderness  Genitourinary:     Comments: continent  Musculoskeletal:      Cervical back: Normal range of motion  Comments: LROM   Skin:     Findings: Lesion present  Comments: Left upper arm - wound healed, no redness, denies pain   Neurological:      Mental Status: He is alert  Gait: Gait abnormal    Psychiatric:         Mood and Affect: Mood normal          Behavior: Behavior normal               Procedures           Patient's care was coordinated with nursing facility staff  Recent vitals, labs and updated medications were reviewed on EMR or chart system of facility  Past Medical, surgical, social, medication and allergy history and patient's previous records were reviewed and updated as appropriate:     Patient Active Problem List   Diagnosis    Benign prostatic hyperplasia    Degeneration of thoracic intervertebral disc    Hearing loss    Malignant neoplasm of prostate [facility]    Antral ulcer    Pancreatic lesion    Ambulatory dysfunction    MG (myasthenia gravis) ([facility]    Stage 3 chronic kidney disease ([facility] )    Frailty syndrome in geriatric patient    Intravenous infiltration     Past Medical History:   Diagnosis Date    Cancer ([facility]     Kidney disease     SYED (obstructive sleep apnea)     [facility]     Rotator cuff rupture 10/20/2006     History reviewed   No pertinent surgical history    Social History     Socioeconomic History    Marital status: Unknown     Spouse name: None    Number of children: None    Years of education: None    Highest education level: None   Occupational History    None   Tobacco Use    Smoking status: Former Smoker     Types: Pipe    Smokeless tobacco: Former User   Substance and Sexual Activity    Alcohol use: Never    Drug use: Never    Sexual activity: None   Other Topics Concern    None   Social History Narrative    None     Social Determinants of Health     Financial Resource Strain: Not on file   Food Insecurity: Not on file   Transportation Needs: Not on file   Physical Activity: Not on file   Stress: Not on file   Social Connections: Not on file   Intimate Partner Violence: Not on file   Housing Stability: Not on file        Current Outpatient Medications:     amLODIPine (NORVASC) 5 mg tablet, Take 5 mg by mouth daily, Disp: , Rfl:     Calcium Carbonate-Vitamin D 600-400 MG-UNIT per tablet, Take by mouth, Disp: , Rfl:     cyanocobalamin (VITAMIN B-12) 100 MCG tablet, Take by mouth, Disp: , Rfl:     Diclofenac Sodium (VOLTAREN) 1 %, Apply 2 g topically daily To B/L UE, Disp: , Rfl:     Doxepin HCl 6 MG TABS, Take 6 mg by mouth daily as needed (Patient not taking: Reported on 2/16/2022 ), Disp: , Rfl:     ergocalciferol (ERGOCALCIFEROL) 1 25 MG (53960 UT) capsule, Take 50,000 Units by mouth once a week Every Monday, Disp: , Rfl:     esomeprazole (NexIUM) 40 mg packet, , Disp: , Rfl:     gabapentin (NEURONTIN) 300 mg capsule, Take 300 mg by mouth daily at bedtime, Disp: , Rfl:     ipratropium-albuterol (DUO-NEB) 0 5-2 5 mg/3 mL nebulizer solution, Take 3 mL by nebulization 4 (four) times a day, Disp: , Rfl:     ipratropium-albuterol (DUO-NEB) 0 5-2 5 mg/3 mL nebulizer solution, Take 3 mL by nebulization every 6 (six) hours as needed, Disp: , Rfl:     Multiple Vitamins-Minerals (PreserVision AREDS) TABS, Take 1 tablet by mouth daily, Disp: , Rfl:     pantoprazole (PROTONIX) 40 mg tablet, Take 40 mg by mouth 2 (two) times a day, Disp: , Rfl:     sucralfate (CARAFATE) 1 g tablet, Take 1 g by mouth 4 (four) times a day, Disp: , Rfl:     terazosin (HYTRIN) 10 MG capsule, Take 5 mg by mouth daily at bedtime , Disp: , Rfl:   Family History   Problem Relation Age of Onset    Diabetes Mother     Cancer Father               Coordination of Care: Wound team aware of the treatment plan  Facility nurse will provide wound treatment and monitor the wound for any changes  Patient / Staff education : Patient / Staff was given education on sign of infection and pressure ulcer prevention  Patient/ Staff verbalized understanding     Follow up :  Return sign off  Voice-recognition software may have been used in the preparation of this document  Occasional wrong word or "sound-alike" substitutions may have occurred due to the inherent limitations of voice recognition software  Interpretation should be guided by context        JOSE Barraza

## 2022-03-03 NOTE — TELEPHONE ENCOUNTER
[address] [place] [address] [phone]  Was informed that the referral is approved but they are still processing all of his medical records and documentation to send over to our office     [address] [place] [address] [phone], spoke with representative Dino Swanson  Provided Dino Swanson with codes of [id number], [id number], 57590 and states these codes do not require [place]  Reference#: Gerard Comment listed as: supplement returned    Attempted to clara patient's son to update him with all above information   He is not accepting calls at this time, call did not go through

## 2022-03-04 NOTE — TELEPHONE ENCOUNTER
[facility]ed [facility], spoke with representative Mojgan Del Valle  She confirms that with the referral to [facility], patient can go to any [facility] for his infusion  The referral is NOT site specific     Called [facility] to see when their next 3 consecutive day would be  Next available 3/28 - 3/30    [facility]  Next available 3/16 - 3/18, most likely 8 AM appointment     [facility]  Next available 3/8 - 3/10     [facility], next available 3/22 - 3/24    Called Jacques Jarvis, she would prefer the [facility]  Cannot do [place] as patient has CT on 3/16    Updated therapy plan to start 3/22 for TANNER   Once signed will call  Infusion center and schedule

## 2022-03-04 NOTE — TELEPHONE ENCOUNTER
Received referral approval from [place]  Please see scanned under media tab  Infusion therapy listed as a covered benefit    [address]  Next 3 consecutive days available are 3/29 - 3/31  Called Carleen Mccracken from [address]  She will accept those dates but would appreciate if we could get him in sooner  I am unsure if the infusion site can be changed with [facility] insurance  Will check on this     Updated therapy plan to start on 3/29 and forwarded to Larry Park for review and signature    Once signed, will call and get patient scheduled with [facility]   Will need to call Carleen Mccracken back with infusion times

## 2022-03-04 NOTE — TELEPHONE ENCOUNTER
Called [facility]  Patient is scheduled for:    3/22 @ 8:30  3/23 @ 8:00  3/24 @ 8:30    Attempted to call Mj Alvarez at [facility], left message for return call to office to confirm dates/times    Also attempted to call patient's son but phone does not ring   States he is not accepting calls at this time

## 2022-03-04 NOTE — TELEPHONE ENCOUNTER
Michael Both from [facility] returned call to office   Made her aware of times/dates for patient's IVIG     Also received updated contact information for patient's son:     (c): [phone]  (h): [phone]

## 2022-03-08 PROBLEM — E16.2 HYPOGLYCEMIA: Status: ACTIVE | Noted: 2022-01-01

## 2022-03-08 PROBLEM — G93.40 ENCEPHALOPATHY: Status: ACTIVE | Noted: 2022-01-01

## 2022-03-08 PROBLEM — R79.89 ELEVATED TSH: Status: ACTIVE | Noted: 2022-01-01

## 2022-03-08 NOTE — ASSESSMENT & PLAN NOTE
Diagnosed over 6 years ago, follows with [facility] neurology and Hematology  Last IVIG infusion documented December 2021, patient is supposed to receive treatments every 4-6 hours per chart review  [facility] on March 22, 2022 for IVIG infusion      Plan  · Neurology consulted; appreciate recs  · Started IVIG, first dose 3/8/22  · Monitor O2  · Pending Ach-R and MUSK antibodies  · Normal cortisol

## 2022-03-08 NOTE — ASSESSMENT & PLAN NOTE
69-year-old male presented to the ED via AMS from nursing home for hypoglycemia in the 30s that persisted after glucagon administration  Also having altered mental status  Patient was found hypothermic, 92 7  Pulse 30-60s  Blood pressures averaging in 90s/50s, satting >96% on RA  Patient is not on diabetes medications and neither has a history of diabetes  Blood sugars improved after D10 bolus into the 200-300s however continued blood sugar checks were hypoglycemic 22-53  POA unresponsive, was not speaking however improved and was Aaox4  DDx encephalopathy 2/2 metabolic derangements, hypothermia, infection, IVIG noncompliance for treatment of MG, medication error       Plan  · Continue SD 1  · Hypoglycemia  · Continue Q1H accucheck  · D10 @ 100 ml/hr  · Pending C-peptide  · Hypoglycemic protocol  · Monitor CMP  · Monitor temperature  · Continue gamaliel hugger  · Monitor temperature  · Cortisol Normal  · F/u blood cultures  · S/p ceftriaxone and vancomycin x1 in ED @ 1500  · 3/9: Day 2 ordered for 1pm  · PCT 0 08  · Pending AM reflex  · U/A remarkable for 1+ ketone, negative for leukocytes/nitrites  · Toxicology consulted; appreciate recs  · Octreotide 50 mcg given empirically, should cover for sulfonylurea

## 2022-03-08 NOTE — QUICK NOTE
Progress Note - Triage Assessment   Kenny Powers 81 y o  male MRN: [id number]    Time Called: 5348  Date Called: 03/08/22  Room#: ED 14  Time Evaluated: 2281  Person requesting evaluation: Dr Pierre Reason to ED to evaluate patient for possible admission to Critical Care Service, chart reviewed and patient evaluated  Patient presented hypothermic and hypoglycemic from a nursing home Ed called us to evaluate him due to glucose dropping below 30 despite giving D5 amps and ordering D5NSS gtt  On evaluation, patient was alert, knew his name and location  Unable to follow commands but, able to move extremities freely  Review of documented vitals show steady temperature and HR  improvement with improvement of glucose  There is was a drop in glucose to 22 at 1500 that is an outlier with adequate response to an amp of dextrose  Patient was receiving an antibiotic at time of critical care evaluation and IVF were not actively running  Given steady improvement of HR, BP and temp with overall stabilization of blood glucose  He does not require critical care or stepdown level 1  I would suggest changing IVF to D5 45% NSS given hypernatremia with q 1 hour BS checks till stabilized  and regular BMPs  Advised to reach out to [facility] for admission and to reach out if any concerns or patient were to worsen  Recommendations discussed with ED attending Dr Renard Pratt and Dr Myrna Jaeger         Triage Assessment:     Patient appropriate to be admitted to SD level 2 of care      If any questions or concerns please call the critical care team at ext [phone]

## 2022-03-08 NOTE — ASSESSMENT & PLAN NOTE
Lab Results   Component Value Date    HGB 10 3 (L) 03/08/2022   Per CareEverywhere chart review, patient Hgb baseline 10    Plan:  · Monitor CBC, transfuse Hgb <7

## 2022-03-08 NOTE — ASSESSMENT & PLAN NOTE
Lab Results   Component Value Date    CREATININE 1 66 (H) 03/08/2022     Per chart review, baseline Cr 1 5-1 6      Plan:  · Monitor Cr daily  · Monitor I/O

## 2022-03-08 NOTE — RESPIRATORY THERAPY NOTE
Attempted to have pt perform NIF and VC  Pt unable  to comprehend how  to perform  Dr James Strickland aware

## 2022-03-08 NOTE — ED ATTENDING ATTESTATION
3/8/2022  I, Rolando Pineda MD, saw and evaluated the patient  I have discussed the patient with the resident/non-physician practitioner and agree with the resident's/non-physician practitioner's findings, Plan of Care, and MDM as documented in the resident's/non-physician practitioner's note, except where noted  All available labs and Radiology studies were reviewed  I was present for key portions of any procedure(s) performed by the resident/non-physician practitioner and I was immediately available to provide assistance  At this point I agree with the current assessment done in the Emergency Department  I have conducted an independent evaluation of this patient a history and physical is as follows:    81-year-old male presents to the emergency department via EMS from his local nursing center residence [facility]) with altered mentation and hypoglycemia  Phone calls were received ahead of time by my colleague from a nurse practitioner caring for him as well as myself by his nurse providing some background history  They report he was being sent in for acute mental status change which was initially appreciated on yesterday's 15:00 to 23:00 shift  This morning he was found to be hypoglycemic with blood glucose of 38  He was combative, disrobing and refusing medications which is very uncharacteristic of him  He was administered glucagon although remained combative to the degree that no repeat point of care glucose testing had been performed at time of my conversation with nurse  She provided history that he does have myasthenia gravis  He has not had his IV infusion for this in some time and earliest it could be arranged for was the 21st   She relates that his son is aware of his change of condition and reports that he has had similar behavior changes when he is due for this medication    Noted on his chart was a diagnosis of hyperglycemia although he is not on any medications for diabetes/glucose control  At baseline he is extremely hard of hearing-borderline deaf, has dysphagia, COPD (without supplemental O2 requirement), sleep apnea and hypertension  On arrival he was found to be hypothermic  Repeat blood glucose was 169  Patient has good coloring and rests still on stretcher  Respirations are comfortable in appearance  Oxygen saturation is 99 to 100% on room air  Heart rate on monitor varies between the 30s and 60s  Uncertain whether P-waves are present as voltage is low  Complexes do appear similarly spaced apart  He does not respond to loud voice or most physical stimuli  Upon light palpation of face he did close his mouth from a slightly open position  Upon reopening this he does  Strong gag reflex is present  There are no pooled secretions  Heart sounds regular-slow  Lungs clear to auscultation  Abdomen is soft with normoactive bowel sounds  Umbilical hernia appreciated with smaller rounds erythema and skin fold  Small amount of discharge present-fungal in appearance and odor  Majority of this hernia reduces  Patient grimaces slightly upon this and site re expands to similar fullness  Remainder of abdomen seems nontender  Extremities are non-tender without swelling  They range easily with passive motions  Upon stimuli of the feet he does withdraw each slightly  Plus two PT pulses  Small areas of ecchymosis over the right upper arm as well as forearms and hands  No swelling  Nonverbal  No facial asymmetry/ deficit appreciated  Pupils briskly reactive to light (2 to 1 mm)  No gaze deviation  No nystagmus  midline & range this laterally  Symmetric/ intact sensation in the upper, mid & lower portions of the face  Differential diagnosis is broad  Patient was hypoglycemic earlier this morning  This has been corrected although duration of prior hypoglycemia unclear  This could be responsible for his hypothermia    Certainly must consists infectious process, ICH/ CVA, ACS contributing  Myasthenia gravis can be responsible for decreased strength  He is still preserving respiratory function  Would not distinctly expect this to affect mentation  Broad evaluation in progress  ED Course  ED Course as of 03/08/22 2058   Tue Mar 08, 2022   1341 BP dropped to SBP 81  HR 30s-50s during this Transient pause in respiration appreciated  BG less than 30  D10 bolus in progress  1434 HR varies - sinus alcon - NSR (30s-60s)  At times blood pressure is normal   It intermittently drops to 23J and 87X systolic fully  At this time with normal glucose cannot attributed to hypoglycemia  Will try small dose of atropine  26 I did contact Neurology (Dr Socrates Royal) with concern that some of his symptoms may be related to the myasthenia gravis  It has been a while since his last treatment for this  Although separate metabolic process likely contributing to symptoms administration of 1 dose would be appropriate  Will give IVIG at prior dose of 1 gram/kilogram   Neurology note from February reviewed  1456 Patient at CT scan now  Will view head images as soon as they are available  As IVIG dose is high and infusion may require titration on with close monitor as long as he can get to his inpatient bed in the next couple of hours it is more appropriate to initiate infusion on the floor as per contact w/ Pharmacist Michael  Will reach out to critical care immediately after reviewing CTs    1505 Patient began rousing while at [address]  Upon return to room his systolic pressures in the 130s  His temperature is up to the high 93s  Heart rate 50s to 60s  Atropine had not yet been administered  Holding on this presently  When questioned how he is doing patient not his head side to side suggesting poorly  He is able to  hands to command  Blood glucose has lowered again in less than 1 hour from 354 to less than 30  Second bolus of D10 as being ordered    No evidence of ICH on head CT  Formal interpretation pending  Contacting critical care  56 Dr Mariel Gomez advises dose of IVIG 1 grams/kilogram be administered evenly over 3 days  I spoke with pharmacy following this and they are making appropriate change to dosing  Critical care advanced practitioners have been down to evaluate patient  His temperature has further elevated above 94 and he is now conversing with them-dramatic improvement from that on arrival   Recent blood pressures have been within normal ranges well without return to hypotension  Heart rates now more consistently in the 60s as opposed to lower  Etiology hypoglycemia remains uncertain     Patient had significant further improvement of temperature & mentation with ability to respond verbally to questions  With recurrent episodes of hypoglycemia (of unclear etiology) still requires frequent Accu-Cheks  Admitting to SD1    Critical Care Time  CriticalCare Time  Performed by: Susie Del Valle MD  Authorized by: Susie Del Valle MD     Critical care provider statement:     Critical care time (minutes):  60    Critical care was necessary to treat or prevent imminent or life-threatening deterioration of the following conditions:  CNS failure or compromise and endocrine crisis    Critical care was time spent personally by me on the following activities:  Obtaining history from patient or surrogate, examination of patient, review of old charts, ordering and performing treatments and interventions, ordering and review of laboratory studies, ordering and review of radiographic studies, re-evaluation of patient's condition, evaluation of patient's response to treatment, development of treatment plan with patient or surrogate and discussions with consultants

## 2022-03-08 NOTE — ASSESSMENT & PLAN NOTE
Polly Lino is a 81 y o  male with myasthenia gravis (primarily occular), hearing deficit, HTN, baseline dysphagia, COPD, sleep apnea, CKD stage 3, and prostate cancer who presented to [facility] ED on 3/8/22 from [place] [address] with AMS in the setting of hypoglycemia, hypotension, hypothermia and multiple metabolic disturbances detailed below  Labs on presentation:  - Initial fingerstick glucose of 169,  BMP approximately 1 hour later revealed hypoglycemia at 37  - Creatinine elevated 1 66  - Hypernatremic, 148  - Hypokalemic, 3 4  - TSH elevated 4 983, Free T4 1 21  - Lactic acidosis, 2 3  - UA:  Negative nitrite, 0-1 WBC  - Results WNL: Procalcitonin, troponin, COVID/flu/RSV, RDU, Blood cultures x 2  - CTH negative for acute abnormality  Medical Toxicology consulted, reporting possible differential diagnosis for hypoglycemia to be sepsis vs patient having been given incorrect medications (ie  Sulfonylureas) at SNF  POC BS this morning 72  Patient remains encephalopathic and agitated, in bilateral upper extremity restraints  Patient continues to not follow commands, however remains nonfocal on exam, specifically without evidence of ocular weakness typically seen with MG  S/p  IVIG x 3 days  A few medications have been utilized to aid in agitation including Haldol (ineffective) and fentanyl (effective for a short period of time)  Continue to suspect Toxic Metabolic Encephalopathy secondary to suspected pulmonary infectious etiology  Clinical picture inconsistent with Myasthenia Gravis exacerbation  Meningitis less likely based on examine findings above  Cannot exclude component of seizure   Less likely central etiology as exam remains nonfocal     Plan:   - Completed IVIG at 1 g/kg daily x 3 days, lasting over 4 hours with 250 cc of IVF prior to dose  · 1st dose on 3/9 at 0028, 2nd dose on 03/10 at 0024, 3rd dose on 03/11 at 0050  - Pending: Acetylcholine receptors (binding, blocking, modulating), Musk antibody, Sulfonylurea screen  - Procalcitonin continues to trend upward - antibiotic management per ICU team  - Will trend creatinine in the setting of JUDITH and IVIG administration  - Obtain vital capacity and NIF if able, continue to monitor O2  - Difficulty obtaining routine EEG due to agitation and combativeness  - Patient unlikely to tolerate MRI brain  - Consider repeat CT head if patient remains encephalopathic without clear etiology   - Consider CXR  - Continue correction of toxic/metabolic/infectious etiologies   - Telemetry  - Medical management supportive care per primary team, notify with changes  - Patient follow-up with outpatient neuromuscular specialist on discharge  - Neurology to sign off at this time, please call with questions

## 2022-03-08 NOTE — PROGRESS NOTES
[facility]  Progress Note - Karen Poag 1935, 81 y o  male MRN: [id number]  Unit/Bed#: S -01 Encounter: [id number]  Primary Care Provider: Mickey Ugalde MD   Date and time admitted to hospital: 3/8/2022 12:14 PM    * Encephalopathy  Assessment & Plan  43-year-old male presented to the ED via AMS from nursing home for hypoglycemia in the 30s that persisted after glucagon administration  Also having altered mental status  Patient was found hypothermic, 92 7  Pulse 30-60s  Blood pressures averaging in 90s/50s, satting >96% on RA  Patient is not on diabetes medications and neither has a history of diabetes  Blood sugars improved after D10 bolus into the 200-300s however continued blood sugar checks were hypoglycemic 22-53  POA unresponsive, was not speaking however improved and was Aaox4  DDx encephalopathy 2/2 metabolic derangements, hypothermia, infection, IVIG noncompliance for treatment of MG, medication error  Plan  · Admit to CC SD1  · Hypoglycemia  · Q1H accucheck  · D51/2NS @ 100 ml/hr  · Pending C-peptide  · Monitor BMP  · Monitor temperature  · Continue gamaliel hugger  · Pending cortisol  · F/u blood cultures  · S/p ceftriaxone and vancomycin x1 in ED  · PCT 0 08  · Pending AM reflex  · U/A remarkable for 1+ ketone, negative for leukocytes/nitrites      Sepsis [facility])  Assessment & Plan  Hypothermic, hypotensive    Plan:      Chronic anemia  Assessment & Plan  Lab Results   Component Value Date    HGB 10 3 (L) 03/08/2022   Per CareEverywhere chart review, patient Hgb baseline 10    Plan:  · Monitor CBC, transfuse Hgb <7      SYED (obstructive sleep apnea)  Assessment & Plan  · CPAP qhs    Stage 3 chronic kidney disease ([facility] [address] )  Assessment & Plan  Lab Results   Component Value Date    CREATININE 1 66 (H) 03/08/2022     Per chart review, baseline Cr 1 5-1 6      Plan:  · Monitor Cr    MG (myasthenia gravis) ([facility] [address] )  Assessment & Plan  Diagnosed over 6 years ago, follows with [facility] neurology and Hematology  Last IVIG infusion documented December 2021, patient is supposed to receive treatments every 4-6 hours per chart review  Patient has an appointment with [facility] neurology on March 22, 2022 for IVIG infusion  Plan  · Begin IVIG  · Monitor O2  · Pending cortisol, Ach-R and MUSK antibodies    COPD (chronic obstructive pulmonary disease) ([address] )  Assessment & Plan  Takes duonebs at home    Plan  · Xopenex and Atrovent TID    Hypertension  Assessment & Plan  Hypotensive POA    Plan:  · Hold home Norvasc    Pancreatic lesion  Assessment & Plan  Per chart review 12/2021 - "While inpatient at [facility] he had a CT scan abdomen was concerning for pancreatic mass  MRI noted for 5mm cystic lesion not changed from prior  Continue conservative management at this time "  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    Plan  · 3/8/22 CT c/a/p: PANCREAS:  Unremarkable    Malignant neoplasm of prostate [facility])  Assessment & Plan  Prostate cancer s/p seed implants in 2006  Plan  · Hold home terazosin until HDS    Benign prostatic hyperplasia  Assessment & Plan  PMHx of prostate cancer 2006 s/p seed implants    Plan  · Hold home terazosin until HDS      -------------------------------------------------------------------------------------------------------------  Chief Complaint: Hypoglycemia and AMS from [address]    History of Present Illness   HX and PE limited by: Yareli Reynoso is a 81 y o  male who presents with PMHx of Myasthenia Gravis, CAD, HTN, COPD, BPH, Prostate cancer in 2006 s/p seed implants, benign pulmonary nodules, SYED on CPAP, presbycusis, no history of DM arrived via EMS from [address] [place] [address]  Overnight shift noticed blood sugars in 30s and gave glucacon  Recheck this AM was also 30s and patient also was combative and exhibited AMS  POA - hypothermic 92 7, improved with gamaliel hugger   HR ranged from 30-60s with linear improvement with increase in core temperature  Blood pressures in the /48-63  Breathing SpO2 % RA  Patient's blood sugars consistently hypoglycemic x3 (53, 22, 30) here after dextrose boluses  Patient receiving D51/2NS @ 100 ml/hr currently with q1 accucheks  Patient is awake and alert however does not respond to verbal commands  Squeezes my fingers immediately but does not let go  Spoke with patient's son - patient at baseline is high functioning  Son notes that mental status has been worsening since this past Sunday  Has been having difficulty getting insurance authorization for IVIG treatments  Confirms that last infusion was November 2021 and should be getting them every 4-6 weeks  Per chart review, patient's son has stated that patient has had a similar presentation whenever patient has been noncompliant with IVIG infusions  Followed by [facility] neurology and hematology  Last infusion November 2021, recommendations for patient to receive infusions every 4-6 weeks  Has future IVIG infusion scheduled for March 22, 2022 with [facility] neurology  ED workup: Lactate 2 3, procalcitonin WNL, electrolyte abnormalities, baseline creatinine 1 5-1 6  Given D10 bolus x2  BS rises to 200-300s then falls back to 30-50  Given atropine 0 5 mg x1 with HR increase into 60s  Vancomycin x1  CT-head: no acute intracranial abnormalities  CT c/a/p: small bibasilar pleural effusions w/ cardiomegaly  13mm RUL gronudglass opacity with recommendations to f/u repeat CT in 1 year  No acute findings in abdomen or pelvis  Remote 15 year smoking history, quit in 2001  History obtained from chart review and unobtainable from patient due to mental status and lack of cooperation   -------------------------------------------------------------------------------------------------------------  Dispo:  Admit to Stepdown Level 1    Code Status: No Order  --------------------------------------------------------------------------------------------------------------  Review of Systems    Review of systems was unable to be performed secondary to AMS    Physical Exam  Vitals and nursing note reviewed  Constitutional:       General: He is not in acute distress  Appearance: He is well-developed  HENT:      Head: Normocephalic and atraumatic  Right Ear: External ear normal       Left Ear: External ear normal       Nose: Nose normal    Eyes:      Conjunctiva/sclera: Conjunctivae normal    Cardiovascular:      Rate and Rhythm: Normal rate and regular rhythm  Pulses: Normal pulses  Heart sounds: Normal heart sounds  No murmur heard  Pulmonary:      Effort: Pulmonary effort is normal  No respiratory distress  Breath sounds: Normal breath sounds  Abdominal:      General: Abdomen is flat  There is no distension  Palpations: Abdomen is soft  Tenderness: There is no abdominal tenderness  There is no guarding  Musculoskeletal:      Cervical back: Neck supple  Right lower leg: No edema  Left lower leg: No edema  Skin:     General: Skin is warm and dry  Capillary Refill: Capillary refill takes less than 2 seconds  Findings: No rash  Neurological:      Mental Status: He is alert        Comments: Does not respond to commands  Per son, patient is not at baseline  At baseline, patient is high-functioning       --------------------------------------------------------------------------------------------------------------  Vitals:   Vitals:    03/08/22 1700 03/08/22 1715 03/08/22 1730 03/08/22 1745   BP: 106/57 95/55 119/57 100/53   BP Location:  Right arm  Right arm   Pulse: 66 66 72 72   Resp:  16  16   Temp: (!) 95 4 °F (35 2 °C) (!) 95 4 °F (35 2 °C) (!) 95 7 °F (35 4 °C) (!) 96 1 °F (35 6 °C)   TempSrc:       SpO2: 97% 97% 97% 97%   Weight:       Height:         Temp  Min: 92 1 °F (33 4 °C)  Max: 96 1 °F (35 6 °C)  IBW (Ideal Body Weight): 61 5 kg  Height: 5' 5" (165 1 cm)  Body mass index is 26 78 kg/m²  Laboratory and Diagnostics:  Results from last 7 days   Lab Units 03/08/22  1310   WBC Thousand/uL 9 79   HEMOGLOBIN g/dL 10 3*   HEMATOCRIT % 28 6*   PLATELETS Thousands/uL 165   NEUTROS PCT % 83*   MONOS PCT % 7     Results from last 7 days   Lab Units 03/08/22  1310   SODIUM mmol/L 148*   POTASSIUM mmol/L 3 4*   CHLORIDE mmol/L 115*   CO2 mmol/L 23   ANION GAP mmol/L 10   BUN mg/dL 51*   CREATININE mg/dL 1 66*   CALCIUM mg/dL 9 2   GLUCOSE RANDOM mg/dL 37*   ALT U/L 65   AST U/L 55*   ALK PHOS U/L 116   ALBUMIN g/dL 2 6*   TOTAL BILIRUBIN mg/dL 0 31          Results from last 7 days   Lab Units 03/08/22  1311   INR  1 14   PTT seconds 55*          Results from last 7 days   Lab Units 03/08/22  1520 03/08/22  1310   LACTIC ACID mmol/L 2 0 2 3*     ABG:    VBG:    Results from last 7 days   Lab Units 03/08/22  1311   PROCALCITONIN ng/ml 0 08       Micro:  Results from last 7 days   Lab Units 03/08/22  1316   BLOOD CULTURE  Received in Microbiology Lab  Culture in Progress  Received in Microbiology Lab  Culture in Progress  EKG: NSR 60  Imaging: I have personally reviewed pertinent reports  and I have personally reviewed pertinent films in PACS      Historical Information   Past Medical History:   Diagnosis Date    Cancer ([facility] )     Kidney disease     SYED (obstructive sleep apnea)     Prostate cancer ([facility] )     Rotator cuff rupture 10/20/2006     History reviewed  No pertinent surgical history    Social History   Social History     Substance and Sexual Activity   Alcohol Use Never     Social History     Substance and Sexual Activity   Drug Use Never     Social History     Tobacco Use   Smoking Status Former Smoker    Types: Pipe   Smokeless Tobacco Former User     Exercise History: none  Family History:   Family History   Problem Relation Age of Onset    Diabetes Mother     Cancer Father      I have reviewed this patient's family history and commented on sigificant items within the HPI      Medications:  Current Facility-Administered Medications   Medication Dose Route Frequency    dextrose 5 % and sodium chloride 0 45 % infusion  150 mL/hr Intravenous Continuous    dextrose infusion 10 % bolus  250 mL Intravenous Once    immune globulin, human (GAMUNEX-C) 20 g 200 mL IV soln  333 mg/kg (Ideal) Intravenous Q24H    sodium chloride (PF) 0 9 % injection 3 mL  3 mL Intravenous Q1H PRN     Home medications:  Prior to Admission Medications   Prescriptions Last Dose Informant Patient Reported? Taking?    Calcium Carbonate-Vitamin D 600-400 MG-UNIT per tablet   Yes No   Sig: Take by mouth   Diclofenac Sodium (VOLTAREN) 1 %   Yes No   Sig: Apply 2 g topically daily To B/L UE   Doxepin HCl 6 MG TABS   Yes No   Sig: Take 6 mg by mouth daily as needed   Patient not taking: Reported on 2/16/2022    Multiple Vitamins-Minerals (PreserVision AREDS) TABS   Yes No   Sig: Take 1 tablet by mouth daily   amLODIPine (NORVASC) 5 mg tablet   Yes No   Sig: Take 5 mg by mouth daily   cyanocobalamin (VITAMIN B-12) 100 MCG tablet   Yes No   Sig: Take by mouth   ergocalciferol (ERGOCALCIFEROL) 1 25 MG (14718 UT) capsule   Yes No   Sig: Take 50,000 Units by mouth once a week Every Monday   esomeprazole (NexIUM) 40 mg packet   Yes No   gabapentin (NEURONTIN) 300 mg capsule   Yes No   Sig: Take 300 mg by mouth daily at bedtime   ipratropium-albuterol (DUO-NEB) 0 5-2 5 mg/3 mL nebulizer solution   Yes No   Sig: Take 3 mL by nebulization 4 (four) times a day   ipratropium-albuterol (DUO-NEB) 0 5-2 5 mg/3 mL nebulizer solution   Yes No   Sig: Take 3 mL by nebulization every 6 (six) hours as needed   pantoprazole (PROTONIX) 40 mg tablet   Yes No   Sig: Take 40 mg by mouth 2 (two) times a day   sucralfate (CARAFATE) 1 g tablet   Yes No   Sig: Take 1 g by mouth 4 (four) times a day   terazosin (HYTRIN) 10 MG capsule   Yes No   Sig: Take 5 mg by mouth daily at bedtime       Facility-Administered Medications: None     Allergies:  No Known Allergies    ------------------------------------------------------------------------------------------------------------  Advance Directive and Living Will:      Power of :    POLST:    ------------------------------------------------------------------------------------------------------------  Anticipated Length of Stay is > 2 midnights    Care Time Delivered:   per ICU attending      Brennen Saba MD        Portions of the record may have been created with voice recognition software  Occasional wrong word or "sound a like" substitutions may have occurred due to the inherent limitations of voice recognition software    Read the chart carefully and recognize, using context, where substitutions have occurred

## 2022-03-08 NOTE — TELEPHONE ENCOUNTER
Recv'd call from Timo from [address] [place] [address]    I explained to her that we finally got the referral from the [place] and  pt is scheduled 3/22,3/23,3/24 for [facility]  This was already coordinated with Renita Lester from [address] [place] [address]  Renita Lester was supposed to reach out to pt's son to update him  We attempted to reach pt's son several times with no success  Which we communicated to Renita Lester from [address] [place] [address]  Pt son was asking why we had to wait for a referral from the [place], I explained because pt was a self referral to our office and they are his primary coverage  So we needed referral in order to get IVIG approved for patient  Kevin Walker said that pt is declining and pt's son is very upset that we are waiting to start IVIG  ( We tried for earlier times and coordination of this with [address] [place] [address]) Pt has a CT Chest next week  Which didn't work for 3 days in a row of IVIG  Kevin Walker states pt is declining: as he is not speaking much, has trouble swallowing, weaker, more confused and more combative      Is asking for a CB# [phone] Timo    Pt's son# [phone] (new updated phone #)

## 2022-03-08 NOTE — ASSESSMENT & PLAN NOTE
Per chart review 12/2021 - "While inpatient at 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

## 2022-03-08 NOTE — ED PROVIDER NOTES
History  Chief Complaint   Patient presents with    Altered Mental Status     Pt comes from [address] [place] [address] via EMS  Staff at facility said he was acting "strange" between the hours of 11p-7a (combative, spitting, altered mental status)  Blood sugar was 30 around 1030am, given IM glucagon  Hx of this behavior with low BS  EMS administred 200mL D10 in transit  Unknown baseline GCS  Pleasant and cvooperative normally per facility    Hypoglycemia - Symptomatic     Is an 81-year-old male who presents from his [address] [facility] via EMS due to altered mental status starting last evening associated with hypoglycemia  Past medical history significant for myasthenia gravis, COPD (not on O2 at home), SYED, HTN  This morning he was still hypoglycemic to 38, did not receive any medications until noon today (glucagon and D10 by EMS)  Report from the nursing facility states that he has been increasingly combative today, spitting and disrobing, and refusing medications at the nursing home  He has not had any changes in his medications, and hasn't been receiving IVIG recently, but has in the past  According to the nursing facility, son reports that he has been altered before when not receiving any medications for his myasthenia gravis  On arrival patient satting well on room air (100%), hypothermic 92 7, BP normotensive, and HR ranging on the monitor from 30s to 60  Glucose improved to 169 initially, but still altered  He was initially unresponsive to verbal stimuli, and only withdrew from tactile stimuli  Throughout his time in the ED, HR and BP were labile, and temperature slowly vickie with [employer]ger  Glucose very labile despite boluses of dextrose, and maintenance fluids  He received 1x 50mcg Octreotide per Toxicology, and was started on empiric ceftriaxone and vancomycin  Toxicology and Neuro were consulted (please see below in ED course)   He will be started on IVIG 1mg/kg bolus over the course of 3 days once on the floor            Prior to Admission Medications   Prescriptions Last Dose Informant Patient Reported? Taking? Calcium Carbonate-Vitamin D 600-400 MG-UNIT per tablet   Yes No   Sig: Take by mouth   Diclofenac Sodium (VOLTAREN) 1 %   Yes No   Sig: Apply 2 g topically daily To B/L UE   Doxepin HCl 6 MG TABS   Yes No   Sig: Take 6 mg by mouth daily as needed   Patient not taking: Reported on 2/16/2022    Multiple Vitamins-Minerals (PreserVision AREDS) TABS   Yes No   Sig: Take 1 tablet by mouth daily   amLODIPine (NORVASC) 5 mg tablet   Yes No   Sig: Take 5 mg by mouth daily   cyanocobalamin (VITAMIN B-12) 100 MCG tablet   Yes No   Sig: Take by mouth   ergocalciferol (ERGOCALCIFEROL) 1 25 MG (11396 UT) capsule   Yes No   Sig: Take 50,000 Units by mouth once a week Every Monday   esomeprazole (NexIUM) 40 mg packet   Yes No   gabapentin (NEURONTIN) 300 mg capsule   Yes No   Sig: Take 300 mg by mouth daily at bedtime   ipratropium-albuterol (DUO-NEB) 0 5-2 5 mg/3 mL nebulizer solution   Yes No   Sig: Take 3 mL by nebulization 4 (four) times a day   ipratropium-albuterol (DUO-NEB) 0 5-2 5 mg/3 mL nebulizer solution   Yes No   Sig: Take 3 mL by nebulization every 6 (six) hours as needed   pantoprazole (PROTONIX) 40 mg tablet   Yes No   Sig: Take 40 mg by mouth 2 (two) times a day   sucralfate (CARAFATE) 1 g tablet   Yes No   Sig: Take 1 g by mouth 4 (four) times a day   terazosin (HYTRIN) 10 MG capsule   Yes No   Sig: Take 5 mg by mouth daily at bedtime       Facility-Administered Medications: None       Past Medical History:   Diagnosis Date    [facility] [address] [facility]     Kidney disease     SYED (obstructive sleep apnea)     [facility]     Rotator cuff rupture 10/20/2006       History reviewed  No pertinent surgical history  Family History   Problem Relation Age of Onset    Diabetes Mother     Cancer Father      I have reviewed and agree with the history as documented      E-Cigarette/Vaping     E-Cigarette/Vaping Substances     Social History     Tobacco Use    Smoking status: Former Smoker     Types: Pipe    Smokeless tobacco: Former User   Substance Use Topics    Alcohol use: Never    Drug use: Never        Review of Systems   Unable to perform ROS: Mental status change       Physical Exam  ED Triage Vitals   Temperature Pulse Respirations Blood Pressure SpO2   03/08/22 1245 03/08/22 1220 03/08/22 1220 03/08/22 1220 03/08/22 1220   (S) (!) 92 7 °F (33 7 °C) 68 16 124/58 100 %      Temp Source Heart Rate Source Patient Position - Orthostatic VS BP Location FiO2 (%)   03/08/22 1245 03/08/22 1220 03/08/22 1220 03/08/22 1220 --   Rectal Monitor Sitting Left arm       Pain Score       03/08/22 1220       No Pain             Orthostatic Vital Signs  Vitals:    03/08/22 1545 03/08/22 1630 03/08/22 1645 03/08/22 1700   BP: 123/59 105/57 103/57 106/57   Pulse: 68 66 66 66   Patient Position - Orthostatic VS: Lying          Physical Exam  Vitals reviewed  Constitutional:       General: He is not in acute distress  HENT:      Head: Normocephalic and atraumatic  Mouth/Throat:      Mouth: Mucous membranes are moist       Pharynx: Oropharynx is clear  Eyes:      General: No scleral icterus  Pupils: Pupils are equal, round, and reactive to light  Cardiovascular:      Rate and Rhythm: Regular rhythm  Heart sounds: Normal heart sounds  Comments: HR ranging from 30 to 60  Pulmonary:      Effort: Pulmonary effort is normal  No respiratory distress  Breath sounds: Normal breath sounds  Abdominal:      General: Abdomen is flat  Bowel sounds are normal       Palpations: Abdomen is soft  Tenderness: There is no abdominal tenderness  Comments: Umbilical hernia   Musculoskeletal:         General: No swelling or tenderness  Skin:     General: Skin is dry  Coloration: Skin is not cyanotic or pale  Findings: No erythema  Neurological:      Mental Status: He is unresponsive        GCS: GCS eye subscore is 1  GCS verbal subscore is 1  GCS motor subscore is 4  ED Medications  Medications   sodium chloride (PF) 0 9 % injection 3 mL (has no administration in time range)   vancomycin (VANCOCIN) 1500 mg in sodium chloride 0 9% 250 mL IVPB (1,500 mg Intravenous New Bag 3/8/22 1658)   immune globulin, human (GAMUNEX-C) 20 g 200 mL IV soln (has no administration in time range)   dextrose 5 % and sodium chloride 0 45 % infusion (100 mL/hr Intravenous New Bag 3/8/22 1654)   dextrose infusion 10 % bolus (has no administration in time range)   sodium chloride 0 9 % bolus 500 mL (0 mL Intravenous Stopped 3/8/22 1429)   dextrose infusion 10 % bolus (0 mL Intravenous Stopped 3/8/22 1357)   octreotide (SandoSTATIN) injection 50 mcg (50 mcg Intravenous Given 3/8/22 1524)   cefTRIAXone (ROCEPHIN) 2,000 mg in dextrose 5 % 50 mL IVPB (0 mg Intravenous Stopped 3/8/22 1635)   iodixanol (VISIPAQUE) 320 MG/ML injection 100 mL (100 mL Intravenous Given 3/8/22 1456)   dextrose infusion 10 % bolus (0 mL Intravenous Stopped 3/8/22 1540)       Diagnostic Studies  Results Reviewed     Procedure Component Value Units Date/Time    Cortisol [[id number]] Collected: 03/08/22 1702    Lab Status: No result Specimen: Blood from Arm, Right     Fingerstick Glucose (POCT) [[id number]]  (Abnormal) Collected: 03/08/22 1700    Lab Status: Final result Updated: 03/08/22 1701     POC Glucose 53 mg/dl     C-peptide [[id number]] Collected: 03/08/22 1651    Lab Status: In process Specimen: Blood from Arm, Right Updated: 03/08/22 1657    Acetylcholine receptor, blocking [[id number]] Collected: 03/08/22 1651    Lab Status: In process Specimen: Blood from Arm, Right Updated: 03/08/22 1657    MUSK Antibody [[id number]] Collected: 03/08/22 1651    Lab Status: In process Specimen: Blood from Arm, Right Updated: 03/08/22 1657    Acetylcholine receptor, binding [[id number]] Collected: 03/08/22 1651    Lab Status:  In process Specimen: Blood from Arm, Right Updated: 03/08/22 1657    HS Troponin I 4hr [[id number]] Collected: 03/08/22 1651    Lab Status: In process Specimen: Blood from Arm, Right Updated: 03/08/22 1657    Lactic acid 2 Hours [[id number]]  (Normal) Collected: 03/08/22 1520    Lab Status: Final result Specimen: Blood from Arm, Left Updated: 03/08/22 1622     LACTIC ACID 2 0 mmol/L     Narrative:      Result may be elevated if tourniquet was used during collection  Acetylcholine receptor, modulating - Miscellaneous Test [[id number]]     Lab Status: No result Specimen: Blood     HS Troponin I 2hr [[id number]]  (Normal) Collected: 03/08/22 1520    Lab Status: Final result Specimen: Blood from Arm, Left Updated: 03/08/22 1609     hs TnI 2hr 15 ng/L      Delta 2hr hsTnI -2 ng/L     Fingerstick Glucose (POCT) [[id number]]  (Abnormal) Collected: 03/08/22 1558    Lab Status: Final result Updated: 03/08/22 1602     POC Glucose 211 mg/dl     Blood culture #1 [[id number]] Collected: 03/08/22 1316    Lab Status: Preliminary result Specimen: Blood from Arm, Left Updated: 03/08/22 1521     Blood Culture Received in Microbiology Lab  Culture in Progress  Blood culture #2 [[id number]] Collected: 03/08/22 1316    Lab Status: Preliminary result Specimen: Blood from Arm, Right Updated: 03/08/22 1521     Blood Culture Received in Microbiology Lab  Culture in Progress  T3 [[id number]] Collected: 03/08/22 1310    Lab Status: In process Specimen: Blood from Arm, Right Updated: 03/08/22 1517    T4, free [[id number]] Collected: 03/08/22 1311    Lab Status:  In process Specimen: Blood from Arm, Right Updated: 03/08/22 1516    Fingerstick Glucose (POCT) [[id number]]  (Abnormal) Collected: 03/08/22 1504    Lab Status: Final result Updated: 03/08/22 1505     POC Glucose 22 mg/dl     Lactic acid [[id number]]  (Abnormal) Collected: 03/08/22 1310    Lab Status: Final result Specimen: Blood from Arm, Right Updated: 03/08/22 1441     LACTIC ACID 2 3 mmol/L     Narrative:      Result may be elevated if tourniquet was used during collection      Procalcitonin with AM Reflex [[id number]]  (Normal) Collected: 03/08/22 1311    Lab Status: Final result Specimen: Blood from Arm, Right Updated: 03/08/22 1437     Procalcitonin 0 08 ng/ml     Procalcitonin Reflex [[id number]]     Lab Status: No result Specimen: Blood     Fingerstick Glucose (POCT) [[id number]]  (Abnormal) Collected: 03/08/22 1418    Lab Status: Final result Updated: 03/08/22 1420     POC Glucose 354 mg/dl     Comprehensive metabolic panel [[id number]]  (Abnormal) Collected: 03/08/22 1310    Lab Status: Final result Specimen: Blood from Arm, Right Updated: 03/08/22 1414     Sodium 148 mmol/L      Potassium 3 4 mmol/L      Chloride 115 mmol/L      CO2 23 mmol/L      ANION GAP 10 mmol/L      BUN 51 mg/dL      Creatinine 1 66 mg/dL      Glucose 37 mg/dL      Calcium 9 2 mg/dL      Corrected Calcium 10 3 mg/dL      AST 55 U/L      ALT 65 U/L      Alkaline Phosphatase 116 U/L      Total Protein 5 8 g/dL      Albumin 2 6 g/dL      Total Bilirubin 0 31 mg/dL      eGFR 37 ml/min/1 73sq m     Narrative:      [place] guidelines for Chronic Kidney Disease (CKD):     Stage 1 with normal or high GFR (GFR > 90 mL/min/1 73 square meters)    Stage 2 Mild CKD (GFR = 60-89 mL/min/1 73 square meters)    Stage 3A Moderate CKD (GFR = 45-59 mL/min/1 73 square meters)    Stage 3B Moderate CKD (GFR = 30-44 mL/min/1 73 square meters)    Stage 4 Severe CKD (GFR = 15-29 mL/min/1 73 square meters)    Stage 5 End Stage CKD (GFR <15 mL/min/1 73 square meters)  Note: GFR calculation is accurate only with a steady state creatinine    COVID/FLU/RSV - 2 hour TAT [[id number]]  (Normal) Collected: 03/08/22 1311    Lab Status: Final result Specimen: Nares from Nose Updated: 03/08/22 1411     SARS-CoV-2 Negative     INFLUENZA A PCR Negative     INFLUENZA B PCR Negative     RSV PCR Negative    Narrative:      FOR PEDIATRIC PATIENTS - copy/paste COVID Guidelines URL to browser: [email or web address]    SARS-CoV-2 assay is a Nucleic Acid Amplification assay intended for the  qualitative detection of nucleic acid from SARS-CoV-2 in nasopharyngeal  swabs  Results are for the presumptive identification of SARS-CoV-2 RNA  Positive results are indicative of infection with SARS-CoV-2, the virus  causing COVID-19, but do not rule out bacterial infection or co-infection  with other viruses  Laboratories within the [place] and its  territories are required to report all positive results to the appropriate  public health authorities  Negative results do not preclude SARS-CoV-2  infection and should not be used as the sole basis for treatment or other  patient management decisions  Negative results must be combined with  clinical observations, patient history, and epidemiological information  This test has not been FDA cleared or approved  This test has been authorized by FDA under an Emergency Use Authorization  (EUA)  This test is only authorized for the duration of time the  declaration that circumstances exist justifying the authorization of the  emergency use of an in vitro diagnostic tests for detection of SARS-CoV-2  virus and/or diagnosis of COVID-19 infection under section 564(b)(1) of  the Act, 21 U  S C  151XZO-4(B)(7), unless the authorization is terminated  or revoked sooner  The test has been validated but independent review by FDA  and CLIA is pending  Test performed using [employer] Gene[employer]pert: This RT-PCR assay targets N2,  a region unique to SARS-CoV-2  A conserved region in the E-gene was chosen  for pan-Sarbecovirus detection which includes SARS-CoV-2      HS Troponin 0hr (reflex protocol) [[id number]]  (Normal) Collected: 03/08/22 1311    Lab Status: Final result Specimen: Blood from Arm, Right Updated: 03/08/22 1409     hs TnI 0hr 17 ng/L     TSH [[id number]]  (Abnormal) Collected: 03/08/22 1310    Lab Status: Final result Specimen: Blood from Arm, Right Updated: 03/08/22 1409     TSH 3RD GENERATON 4 983 uIU/mL     Narrative:      Patients undergoing fluorescein dye angiography may retain small amounts of fluorescein in the body for 48-72 hours post procedure  Samples containing fluorescein can produce falsely depressed TSH values  If the patient had this procedure,a specimen should be resubmitted post fluorescein clearance  Salicylate level [[id number]]  (Abnormal) Collected: 03/08/22 1310    Lab Status: Final result Specimen: Blood from Arm, Right Updated: 32/67/79 5413     Salicylate Lvl <3 mg/dL     Acetaminophen level-If concentration is detectable, please discuss with medical  on call  [[id number]]  (Abnormal) Collected: 03/08/22 1310    Lab Status: Final result Specimen: Blood from Arm, Right Updated: 03/08/22 1409     Acetaminophen Level <2 ug/mL     Ethanol [[id number]]  (Normal) Collected: 03/08/22 1310    Lab Status: Final result Specimen: Blood from Arm, Right Updated: 03/08/22 1405     Ethanol Lvl <3 mg/dL     Rapid drug screen, urine [[id number]]  (Normal) Collected: 03/08/22 1322    Lab Status: Final result Specimen: Urine, Catheter Updated: 03/08/22 1404     Amph/Meth UR Negative     Barbiturate Ur Negative     Benzodiazepine Urine Negative     Cocaine Urine Negative     Methadone Urine Negative     Opiate Urine Negative     PCP Ur Negative     THC Urine Negative     Oxycodone Urine Negative    Narrative:      FOR MEDICAL PURPOSES ONLY  IF CONFIRMATION NEEDED PLEASE CONTACT THE LAB WITHIN 5 DAYS      Drug Screen Cutoff Levels:  AMPHETAMINE/METHAMPHETAMINES  1000 ng/mL  BARBITURATES     200 ng/mL  BENZODIAZEPINES     200 ng/mL  COCAINE      300 ng/mL  METHADONE      300 ng/mL  OPIATES      300 ng/mL  PHENCYCLIDINE     25 ng/mL  THC       50 ng/mL  OXYCODONE      100 ng/mL    Fingerstick Glucose (POCT) [[id number]]  (Abnormal) Collected: 03/08/22 1356    Lab Status: Final result Updated: 03/08/22 1357     POC Glucose 295 mg/dl     Urine Microscopic [[id number]]  (Abnormal) Collected: 03/08/22 1322    Lab Status: Final result Specimen: Urine, Indwelling Santamaria Catheter Updated: 03/08/22 1356     RBC, UA 2-4 /hpf      WBC, UA 0-1 /hpf      Epithelial Cells Occasional /hpf      Bacteria, UA Occasional /hpf      Hyaline Casts, UA 0-1 /lpf     Protime-INR [[id number]]  (Abnormal) Collected: 03/08/22 1311    Lab Status: Final result Specimen: Blood from Arm, Right Updated: 03/08/22 1356     Protime 14 6 seconds      INR 1 14    APTT [[id number]]  (Abnormal) Collected: 03/08/22 1311    Lab Status: Final result Specimen: Blood from Arm, Right Updated: 03/08/22 1356     PTT 55 seconds     Fingerstick Glucose (POCT) [[id number]]  (Abnormal) Collected: 03/08/22 1334    Lab Status: Final result Updated: 03/08/22 1341     POC Glucose 30 mg/dl     CBC and differential [[id number]]  (Abnormal) Collected: 03/08/22 1310    Lab Status: Final result Specimen: Blood from Arm, Right Updated: 03/08/22 1335     WBC 9 79 Thousand/uL      RBC 3 04 Million/uL      Hemoglobin 10 3 g/dL      Hematocrit 28 6 %      MCV 94 fL      MCH 33 9 pg      MCHC 36 0 g/dL      RDW 15 3 %      MPV 11 6 fL      Platelets 124 Thousands/uL      nRBC 0 /100 WBCs      Neutrophils Relative 83 %      Immat GRANS % 0 %      Lymphocytes Relative 9 %      Monocytes Relative 7 %      Eosinophils Relative 1 %      Basophils Relative 0 %      Neutrophils Absolute 8 15 Thousands/µL      Immature Grans Absolute 0 03 Thousand/uL      Lymphocytes Absolute 0 87 Thousands/µL      Monocytes Absolute 0 66 Thousand/µL      Eosinophils Absolute 0 06 Thousand/µL      Basophils Absolute 0 02 Thousands/µL     UA w Reflex to Microscopic w Reflex to Culture [[id number]]  (Abnormal) Collected: 03/08/22 1322    Lab Status: Final result Specimen: Urine, Indwelling Santamaria Catheter Updated: 03/08/22 1334     Color, UA Yellow     Clarity, UA Clear     Specific [place], UA 1 025     pH, UA 5 0     Leukocytes, UA Negative     Nitrite, UA Negative     Protein, UA Negative mg/dl      Glucose, UA Negative mg/dl      Ketones, UA 15 (1+) mg/dl      Urobilinogen, UA 0 2 E U /dl      Bilirubin, UA Negative     Blood, UA Moderate    Fingerstick Glucose (POCT) [[id number]]  (Abnormal) Collected: 03/08/22 1217    Lab Status: Final result Updated: 03/08/22 1219     POC Glucose 169 mg/dl                  CT head without contrast   Final Result by Tristin Durbin MD (03/08 1527)      No acute intracranial abnormality  Workstation performed: [id number]         CT chest abdomen pelvis w contrast   Final Result by Tristin Durbin MD (03/08 1535)      Small bibasilar pleural effusions with cardiomegaly  13 mm right upper lobe groundglass opacity #3/29  Follow-up with repeat CT chest in one year  No acute findings in the abdomen or the pelvis  The study was marked in [facility] for immediate notification  Workstation performed: [id number]               Procedures  ECG 12 Lead Documentation Only    Date/Time: 3/8/2022 1:42 PM  Performed by: Rosetta Peabody, DO  Authorized by: Rosetta Peabody, DO     ECG reviewed by me, the ED Provider: yes    Patient location:  ED  Previous ECG:     Previous ECG:  Unavailable  Interpretation:     Interpretation: non-specific    Quality:     Tracing quality:  Limited by artifact  Rate:     ECG rate:  64    ECG rate assessment: bradycardic    QRS:     QRS axis:  Normal    QRS intervals:  Normal  ST segments:     ST segments: unable to comment    ECG 12 Lead Documentation Only    Date/Time: 3/8/2022 1:44 PM  Performed by: Rosetta Peabody, DO  Authorized by: Rosetta Peabody, DO     ECG reviewed by me, the ED Provider: yes    Patient location:  ED and bedside  Previous ECG:     Previous ECG:  Compared to current  Interpretation:     Interpretation: normal    Rate:     ECG rate:  60    ECG rate assessment: normal    Rhythm:     Rhythm: sinus rhythm    Ectopy:     Ectopy: none    QRS:     QRS axis:  Normal    QRS intervals:  Normal  Conduction:     Conduction: normal    ST segments:     ST segments:  Normal          ED Course  ED Course as of 03/08/22 [address] Mar 08, 2022   1438 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 )  After D10 bolus, glucose 211     1705 Fingerstick Glucose (POCT)(! )  BG 53, ordered another 100cc bolus of D10, and keeping D5/1/2NSS running at 100cc/hr       Also spoke with Neuro--please see attending attestation, with full hospital course  IVIG bolus to be given on the floor  At 1505 patient became more responsive, was able to  hands on command  Glucose 354 --> 22, 2nd bolus of Dextrose 10 being given  SBIRT 22yo+      Most Recent Value   SBIRT (22 yo +)    In order to provide better care to our patients, we are screening all of our patients for alcohol and drug use  Would it be okay to ask you these screening questions?  Unable to answer at this time Filed at: 03/08/2022 1403                MDM  Number of Diagnoses or Management Options  Encephalopathy: new and requires workup      Disposition  Final diagnoses:   Encephalopathy   MG (myasthenia gravis) ([facility] [address] )     Time reflects when diagnosis was documented in both MDM as applicable and the Disposition within this note     Time User Action Codes Description Comment    3/8/2022  1:48 PM Gabi Draft Add [G93 40] Encephalopathy     3/8/2022  2:51 PM Bell Stevie BARRERA Add [G70 00] MG (myasthenia gravis) [facility])       ED Disposition     ED Disposition Condition Date/Time Comment    Admit Stable Tue Mar 8, 2022  5:00 PM Case was discussed with Critical Care and the patient's admission status was agreed to be Admission Status: inpatient status to the service of Dr Mini Orellana as stepdown 1   Follow-up Information    None         Patient's Medications   Discharge Prescriptions    No medications on file     No discharge procedures on file  PDMP Review     None           ED Provider  Attending physically available and evaluated Yeimi Esquivel I managed the patient along with the ED Attending      Electronically Signed by      Tao Berry DO  03/08/22 6917

## 2022-03-08 NOTE — PROGRESS NOTES
Neurology     Vaishnavi Oshea NP from [facility]  [phone]    I explained to her that the 1st time he was evaluated was on February 16th  We did try Mestinon but he had side effects  In the past prednisone and CellCept did result in side effects   Subsequently IVIG was ordered but insurance referral was required  It took several days for the [place] to provide us that information  Once the information was obtained he was scheduled but required three consecutive days  The earliest three consecutive days available in a the outpatient facility was on March 22nd  Next week was also suggested but [facility] staff ( week of march 14)   Was not  able coordinate due to CT scan chest which was  scheduled that week  Our nursing staff did attempt to call the patient's son several times without success  [address]  was informed we had attempted to contact the son several times with out any success  He developed confusion overnight with the low blood sugar is being evaluated in the emergency room at [facility]  Neurology has been consulted    It left a message For pt's son, Rosalinda Hennessy  [phone]      I spoke to  Rayo/art ( his wife) and explained the situation      cell phone [phone]

## 2022-03-08 NOTE — QUICK NOTE
During transfer from ED stretcher to [facility] [address], patient began spitting and pulling at myself and ED RN  Soft restraints applied at this time   Per MS RN provider will place order for soft restraints

## 2022-03-08 NOTE — ASSESSMENT & PLAN NOTE
Patient was originally diagnosed with MG in 2011  He initially was following with Neurology in [facility]  He was first evaluated by Dr Cuauhtemoc Tenorio on 2/16/22      - Trialed multiple medications in the past which failed due to side effects:  · Mestinon, developed GI symptoms (diarrhea)  · Prednisone, unknown side effect  · CellCept, developed SOB, lower extremity swelling, arthralgias  - Patient had been receiving IVIG every 4-6 weeks, last treatment November 2021  - Trialed again on Mestinon 30 mg TID mid to late February 2022, however patient developed symptoms of vision dysfunction, slurred speech, and difficulty elevating bilateral upper extremities; Mestinon was discontinued at that point  - Outpatient acetylcholine receptor antibodies and CT chest ordered as part of MG workup, workup not completed in outpatient setting  - Scheduled to receive IVIG outpatient on 03/22, 03/23, and 03/24, instead will be receiving IVIG inpatient this admission over 3 days   - IVIG had been delayed due to insurance and scheduling issues    - CT chest abdomen pelvis this admission unremarkable for thymoma  - Acetylcholine receptor/MUSK antibodies pending

## 2022-03-08 NOTE — CONSULTS
INTERPROFESSIONAL (PHONE) [facility] Toxicology  Charles Saleem 81 y o  male MRN: [id number]  Unit/Bed#: ED 14 Encounter: [id number]      Reason for Consult / Principal Problem: recurrent hypoglycemia  Inpatient consult to Toxicology  Consult performed by: Robinson Castro DO  Consult ordered by: Latha Bowden MD        03/08/22      ASSESSMENT:  1  Recurrent Hypoglycemia  2  Hypothermia  3  Encephalopathy    RECOMMENDATIONS:  The etiology of the hypoglycemia is unclear  Certainly sepsis remains high the the Ddx  The encephalopathy and hypothermia are likely secondary to the hypoglycemia  The patient is not known to be on any glucose lowering agents  A medication error where he received another patient's sulfonylurea is always possible  If the glucose does not stabilize with dextrose containing IVF, and he has recurrent episodes, then it is reasonable to give 50mcg of octreotide empirically to see if that results in stabilization of glucose as should occur with a sulfonylurea  Insulin and C-peptide levels can be considered as well  I agree with sepsis work up and investigations into other potential causes of hypoglycemia  For further questions, please contact the medical  on call via [employer] or throughl the [email or web address]  Service or Patient [employer]  Please see additional teaching note below:    Hx and PE limited by the dynamics of a phone consultation  I have not personally interviewed or evaluated the patient, but only advised based on the information provided to me  Primary provider is responsible for all clinical decisions  Pertinent history, physical exam and clinical findings and course discussed: Charles Saleem is a 81y o  year old male who presents with altered mental status  He was sent by NH due to altered mental status where he was found to have hypoglycemia  He had recurrence of hypoglycemia after supplemental dextrose   He was not known to be on any insulin or other glucose lowering agents at the NH  Review of systems and physical exam not performed by me  Historical Information   Past Medical History:   Diagnosis Date    Cancer [facility] [address] [facility]     Kidney disease     SYED (obstructive sleep apnea)     Prostate cancer ([facility] [address] [facility]     Rotator cuff rupture 10/20/2006     History reviewed  No pertinent surgical history  Social History   Social History     Substance and Sexual Activity   Alcohol Use Never     Social History     Substance and Sexual Activity   Drug Use Never     Social History     Tobacco Use   Smoking Status Former Smoker    Types: Pipe   Smokeless Tobacco Former User     Family History   Problem Relation Age of Onset    Diabetes Mother     Cancer Father         Prior to Admission medications    Medication Sig Start Date End Date Taking?  Authorizing Provider   amLODIPine (NORVASC) 5 mg tablet Take 5 mg by mouth daily    Historical Provider, MD   Calcium Carbonate-Vitamin D 600-400 MG-UNIT per tablet Take by mouth    Historical Provider, MD   cyanocobalamin (VITAMIN B-12) 100 MCG tablet Take by mouth    Historical Provider, MD   Diclofenac Sodium (VOLTAREN) 1 % Apply 2 g topically daily To B/L UE    Historical Provider, MD   Doxepin HCl 6 MG TABS Take 6 mg by mouth daily as needed  Patient not taking: Reported on 2/16/2022     Historical Provider, MD   ergocalciferol (ERGOCALCIFEROL) 1 25 MG (06910 UT) capsule Take 50,000 Units by mouth once a week Every Monday    Historical Provider, MD   esomeprazole (NexIUM) 40 mg packet  1/1/22   Historical Provider, MD   gabapentin (NEURONTIN) 300 mg capsule Take 300 mg by mouth daily at bedtime    Historical Provider, MD   ipratropium-albuterol (DUO-NEB) 0 5-2 5 mg/3 mL nebulizer solution Take 3 mL by nebulization 4 (four) times a day    Historical Provider, MD   ipratropium-albuterol (DUO-NEB) 0 5-2 5 mg/3 mL nebulizer solution Take 3 mL by nebulization every 6 (six) hours as needed Historical Provider, MD   Multiple Vitamins-Minerals (PreserVision AREDS) TABS Take 1 tablet by mouth daily    Historical Provider, MD   pantoprazole (PROTONIX) 40 mg tablet Take 40 mg by mouth 2 (two) times a day    Historical Provider, MD   sucralfate (CARAFATE) 1 g tablet Take 1 g by mouth 4 (four) times a day    Historical Provider, MD   terazosin (HYTRIN) 10 MG capsule Take 5 mg by mouth daily at bedtime     Historical Provider, MD       Current Facility-Administered Medications   Medication Dose Route Frequency    dextrose 5 % and sodium chloride 0 45 % infusion  100 mL/hr Intravenous Continuous    immune globulin, human (GAMUNEX-C) 20 g 200 mL IV soln  333 mg/kg (Ideal) Intravenous Q24H    sodium chloride (PF) 0 9 % injection 3 mL  3 mL Intravenous Q1H PRN    vancomycin (VANCOCIN) 1500 mg in sodium chloride 0 9% 250 mL IVPB  20 mg/kg Intravenous Once       No Known Allergies    Objective       Intake/Output Summary (Last 24 hours) at 3/8/2022 1729  Last data filed at 3/8/2022 1540  Gross per 24 hour   Intake 1000 ml   Output 100 ml   Net 900 ml       Invasive Devices:   Peripheral IV 03/08/22 Left Forearm (Active)   Site Assessment L 03/08/22 1242       Peripheral IV 03/08/22 Right Forearm (Active)   Site Assessment [facility] 03/08/22 1316   Dressing Type Transparent 03/08/22 1316   Line Status Blood return noted 03/08/22 1316   Dressing Status Dry;Clean; Intact 03/08/22 1316       Urethral Catheter Temperature probe 16 Fr   (Active)   Amt returned on insertion(mL) 100 mL 03/08/22 1320   Reasons to continue Urinary Catheter  Accurate I&O assessment in critically ill patients (48 hr  max) 03/08/22 1320       Vitals   Vitals:    03/08/22 1630 03/08/22 1645 03/08/22 1700 03/08/22 1715   BP: 105/57 103/57 106/57 95/55   TempSrc:       Pulse: 66 66 66 66   Resp:    16   Patient Position - Orthostatic VS:    Lying   Temp: (!) 94 6 °F (34 8 °C) (!) 95 °F (35 °C) (!) 95 4 °F (35 2 °C) (!) 95 4 °F (35 2 °C)         EKG, Pathology, and/or Other Studies: I have personally reviewed pertinent reports  Lab Results: I have personally reviewed pertinent reports  Labs:    Results from last 7 days   Lab Units 03/08/22  1310   WBC Thousand/uL 9 79   HEMOGLOBIN g/dL 10 3*   HEMATOCRIT % 28 6*   PLATELETS Thousands/uL 165   NEUTROS PCT % 83*   LYMPHS PCT % 9*   MONOS PCT % 7      Results from last 7 days   Lab Units 03/08/22  1310   SODIUM mmol/L 148*   POTASSIUM mmol/L 3 4*   CHLORIDE mmol/L 115*   CO2 mmol/L 23   BUN mg/dL 51*   CREATININE mg/dL 1 66*   CALCIUM mg/dL 9 2   ALK PHOS U/L 116   ALT U/L 65   AST U/L 55*      Results from last 7 days   Lab Units 03/08/22  1311   INR  1 14   PTT seconds 55*     Results from last 7 days   Lab Units 03/08/22  1520 03/08/22  1310   LACTIC ACID mmol/L 2 0 2 3*     No results found for: TROPONINI      Results from last 7 days   Lab Units 03/08/22  1310   ACETAMINOPHEN LVL ug/mL <2*   ETHANOL LVL mg/dL <3   SALICYLATE LVL mg/dL <3*     Invalid input(s): EXTPREGUR      Imaging Studies: I have personally reviewed pertinent reports  Counseling / Coordination of Care  Total time spent today 21 minutes  This was a phone consultation

## 2022-03-08 NOTE — ED NOTES
Patient's heart rate continues to drop to 28-30s  Dr Lorenzo Rosario notified and aware  No new orders        Claire Garcia RN  03/08/22 5606

## 2022-03-08 NOTE — CONSULTS
Consultation - Neurology   Tim Park 81 y o  male MRN: [id number]  Unit/Bed#: ED 14 Encounter: [id number]      Assessment/Plan   * Encephalopathy  Assessment & Plan  Tim Park is a 81 y o  male with myasthenia gravis, [place], HTN, baseline dysphagia, COPD, sleep apnea, CKD stage 3, prostate cancer who presents to [facility] ED on 03/08/2022 from [address] [place] [address] facility with AMS and hypoglycemia  Neurology consulted for encephalopathy and myasthenia gravis  Labs on presentation:  - Initial fingerstick glucose of 169,  BMP approximately 1 hour later revealed hypoglycemia at 37  - Creatinine elevated 1 66  - Hypernatremic, 148  - Hypokalemic, 3 4  - TSH elevated 4 983  - Lactic acidosis, 2 3  - UA:  Negative nitrite, 0-1 WBC  - Labs WNL: Procalcitonin, troponin, COVID/flu/RSV, RDU    Known history of myasthenia gravis, previously receiving IVIG every 4-6 weeks  Patient has not had IVIG since November 2021  Myasthenia gravis is not known to cause encephalopathy  Encephalopathy likely toxic metabolic, multifactorial secondary to hypoglycemia, JUDITH, and possible infectious process  CT head unremarkable for acute intracranial abnormalities  On exam patient is dysarthric, however does not exhibit any focal weakness or ocular weakness      Plan:   - Hold off on additional neuroimaging at this time, will reassess on 03/09  - Pending:  Blood cultures x2,   Acetylcholine receptors (binding, blocking, modulating), musk antibody  - Recommend starting IVIG at 1 g/kg over 3 days, lasting over 4 hours with 250 cc of IVF prior to dose  - Will trend creatinine in the setting of JUDITH and IVIG administration  - Will obtain vital capacity and NIF  - Continue correction of toxic/metabolic/infectious etiologies   - Telemetry  - Medical management supportive care per primary team, notify with changes  - Patient follow-up with outpatient neuromuscular specialist on discharge    MG (myasthenia gravis) ([facility] [address] )  Assessment & Plan  - Known history of myasthenia gravis  - Follows with outpatient neuromuscular specialist Cleven Buerger  - Trialed multiple medications in the past which failed due to side effects:  · Mestinon, developed GI symptoms (diarrhea)  · Prednisone, unknown side effect  · CellCept, developed SOB, lower extremity swelling, arthralgias  - Patient had been receiving IVIG every 4-6 weeks, last treatment November 2021  - Trialed again on Mestinon 30 mg TID mid to late February 2022, however patient developed symptoms of vision dysfunction, slurred speech, and difficulty elevating bilateral upper extremities; Mestinon was discontinued at that point  - Outpatient acetylcholine receptor antibodies and CT chest ordered as part of MG workup, workup not completed in outpatient setting  - Scheduled to receive IVIG outpatient on 03/22, 03/23, and 03/24, instead will be receiving IVIG inpatient this admission over 3 days  - CT chest abdomen pelvis this admission unremarkable for thymoma  - Acetylcholine receptor antibodies pending    Shy Matthews will need follow up in in 4 weeks with neuromuscular attending or advance practitioner  He will not require outpatient neurological testing  History of Present Illness     Reason for Consult / Principal Problem: Encephalopathy, myasthenia gravis  Hx and PE limited by: Patient is a poor historian  HPI: Shy Matthews is a 81 y o   male with myasthenia gravis, [place], HTN, baseline dysphagia, COPD, sleep apnea, CKD stage 3, prostate cancer who presents to [facility] ED on 03/08/2022 from [address] [place] [address] with AMS and hypoglycemia  History obtained per chart review  Patient presents from [address] [place] [address] with altered mental status and hypoglycemia  Patient was noted to be altered yesterday 03/07/2022 between the 1044-9647 shift  Patient was found to be hypoglycemic this morning 03/08/2022 with a reported glucose of 38   Patient was reportedly combative and refusing medications  Patient had received glucagon  Patient's son reports that patient has had similar presentation when patient has gone long without receiving his IVIG  Of note, patient follows with outpatient neuromuscular specialist Devon Base for myasthenia gravis, last seen 02/16/2022  Symptoms of mg are predominantly ocular weakness  Previously patient was following with neurology at [facility] with Dr Neri Trujillo  In the past patient had been on Mestinon which caused GI side effects, which occurred at 60 mg TID and later on discontinued  Patient has also been on prednisone and CellCept in the past, both with side effects  Patient had been receiving IVIG every 4-6 weeks at 1 gram/kilogram, which is documented to have been helpful  Recommendations were made to trial Mestinon 30 mg TID and obtain acetylcholine receptor antibodies, Anti-MuSK, and CT chest in the outpatient setting  Last IVIG appears to have been in November 2021  Outpatient neurologist was contacted by NP from the nursing facility who reported that after the patient took the Mestinon, he reported that he could not see, was noted to have slurred speech, and was unable to lift bilateral arms  Medication was held and patient returned to baseline  Neurologist recommended discontinuing medication with plan to schedule patient for IVIG  Lab work and CT imaging was not completed  Due to insurance issues, scheduling outpatient IVIG had been delayed  Patient appears to have been scheduled for outpatient IVIG on March 22nd, 23rd, and 24th  Patient presented to [facility] on 03/08/2022  Patient was hypothermic with a temperature of 92 7° F on presentation, /58  Within 2 hours of presentation, BP dropped to 73/41  CT head unremarkable for acute intracranial abnormalities  CT chest abdomen pelvis revealed small bibasilar pleural effusions with cardiomegaly and 13 mm right upper lobe ground-glass opacity, without any acute findings    Case discussed with attending neurologist who recommended starting IVIG  Inpatient consult to Neurology  Consult performed by: Aubrey Saini PA-C  Consult ordered by: Dnaielle Mcdonnell MD        Review of Systems  12 point ROS limited to encephalopathy   Historical Information   Past Medical History:   Diagnosis Date    Cancer [facility])     Kidney disease     SYED (obstructive sleep apnea)     Prostate cancer ([facility] [address] )     Rotator cuff rupture 10/20/2006     History reviewed  No pertinent surgical history  Social History   Social History     Substance and Sexual Activity   Alcohol Use Never     Social History     Substance and Sexual Activity   Drug Use Never     E-Cigarette/Vaping     E-Cigarette/Vaping Substances     Social History     Tobacco Use   Smoking Status Former Smoker    Types: Pipe   Smokeless Tobacco Former User     Family History:   Family History   Problem Relation Age of Onset    Diabetes Mother     Cancer Father        Review of previous medical records was completed  Meds/Allergies   all current active meds have been reviewed, current meds:   Current Facility-Administered Medications   Medication Dose Route Frequency    dextrose 5 % and sodium chloride 0 45 % infusion  100 mL/hr Intravenous Continuous    immune globulin, human (GAMUNEX-C) 20 g 200 mL IV soln  333 mg/kg (Ideal) Intravenous Q24H    sodium chloride (PF) 0 9 % injection 3 mL  3 mL Intravenous Q1H PRN    vancomycin (VANCOCIN) 1500 mg in sodium chloride 0 9% 250 mL IVPB  20 mg/kg Intravenous Once   , PTA meds:   Prior to Admission Medications   Prescriptions Last Dose Informant Patient Reported? Taking?    Calcium Carbonate-Vitamin D 600-400 MG-UNIT per tablet   Yes No   Sig: Take by mouth   Diclofenac Sodium (VOLTAREN) 1 %   Yes No   Sig: Apply 2 g topically daily To B/L UE   Doxepin HCl 6 MG TABS   Yes No   Sig: Take 6 mg by mouth daily as needed   Patient not taking: Reported on 2/16/2022    Multiple Vitamins-Minerals (PreserVision AREDS) TABS   Yes No   Sig: Take 1 tablet by mouth daily   amLODIPine (NORVASC) 5 mg tablet   Yes No   Sig: Take 5 mg by mouth daily   cyanocobalamin (VITAMIN B-12) 100 MCG tablet   Yes No   Sig: Take by mouth   ergocalciferol (ERGOCALCIFEROL) 1 25 MG (51158 UT) capsule   Yes No   Sig: Take 50,000 Units by mouth once a week Every Monday   esomeprazole (NexIUM) 40 mg packet   Yes No   gabapentin (NEURONTIN) 300 mg capsule   Yes No   Sig: Take 300 mg by mouth daily at bedtime   ipratropium-albuterol (DUO-NEB) 0 5-2 5 mg/3 mL nebulizer solution   Yes No   Sig: Take 3 mL by nebulization 4 (four) times a day   ipratropium-albuterol (DUO-NEB) 0 5-2 5 mg/3 mL nebulizer solution   Yes No   Sig: Take 3 mL by nebulization every 6 (six) hours as needed   pantoprazole (PROTONIX) 40 mg tablet   Yes No   Sig: Take 40 mg by mouth 2 (two) times a day   sucralfate (CARAFATE) 1 g tablet   Yes No   Sig: Take 1 g by mouth 4 (four) times a day   terazosin (HYTRIN) 10 MG capsule   Yes No   Sig: Take 5 mg by mouth daily at bedtime       Facility-Administered Medications: None    and     No Known Allergies    Objective   Vitals:Blood pressure 103/57, pulse 66, temperature (!) 95 °F (35 °C), resp  rate 16, height 5' 5" (1 651 m), weight 73 kg (160 lb 15 oz), SpO2 97 %  ,Body mass index is 26 78 kg/m²  Intake/Output Summary (Last 24 hours) at 3/8/2022 1652  Last data filed at 3/8/2022 1540  Gross per 24 hour   Intake 1000 ml   Output 100 ml   Net 900 ml       Invasive Devices: Invasive Devices  Report    Peripheral Intravenous Line            Peripheral IV 03/08/22 Left Forearm <1 day    Peripheral IV 03/08/22 Right Forearm <1 day          Drain            Urethral Catheter Temperature probe 16 Fr  <1 day              Physical Exam  Vitals and nursing note reviewed  Constitutional:       General: He is not in acute distress  Appearance: He is normal weight  He is ill-appearing   He is not toxic-appearing or diaphoretic  HENT:      Head: Normocephalic and atraumatic  Eyes:      General: No scleral icterus  Right eye: No discharge  Left eye: No discharge  Conjunctiva/sclera: Conjunctivae normal       Pupils: Pupils are equal, round, and reactive to light  Neck:      Comments: No nuchal rigidity noted  Musculoskeletal:      Cervical back: Normal range of motion and neck supple  Skin:     General: Skin is warm and dry  Coloration: Skin is not jaundiced or pale  Findings: No bruising, erythema, lesion or rash  Neurological:      Mental Status: He is alert  Neurologic Exam     Mental Status     Patient is asleep upon entering the room, easily opens eyes to verbal stimuli  Extremely hard of hearing  Eventually is able to state his name  Difficulty stating his location, however is able to nod appropriately when asked if he is in the hospital   Eventually is able to name the hospital   Incorrectly states the year is 2021, afterwards states 2022  Is able to follow central and appendicular commands  Dysarthric speech noted, no obvious evidence of aphasia     Cranial Nerves     CN III, IV, VI   Pupils are equal, round, and reactive to light    Conjugate gaze: present    CN VIII   Hearing: impaired  Pupils round, 2 5 mm, reactive bilaterally  Blink to threat intact bilaterally  Able to count fingers held in front of face  Conjugate gaze noted  Primary gaze midline, no evidence of gaze preference or forced gaze deviation   Able to look in both left and right direction towards examiner, no evidence of nystagmus  No gross facial asymmetry at rest  No tongue lacerations noted     Motor Exam   Muscle bulk: normal   strength symmetric, 5/5 bilaterally   Able to hold all extremities antigravity without drift     Sensory Exam   Sensory exam limited to encephalopathy/ hearing impairment     Gait, Coordination, and Reflexes     Tremor   Resting tremor: absent    Reflexes   Right plantar: normal  Left plantar: normal  Unable to assess coordination due to hearing impairment/encephalopathy  No involuntary movements or seizure-like activity noted throughout exam   Bilateral biceps reflex 2+  Bilateral brachioradialis reflex trace  Bilateral patellar reflexes diminished   Bilateral Achilles reflexes absent     Lab Results: I have personally reviewed pertinent reports    Recent Results (from the past 24 hour(s))   Fingerstick Glucose (POCT)    Collection Time: 03/08/22 12:17 PM   Result Value Ref Range    POC Glucose 169 (H) 65 - 140 mg/dl   CBC and differential    Collection Time: 03/08/22  1:10 PM   Result Value Ref Range    WBC 9 79 4 31 - 10 16 Thousand/uL    RBC 3 04 (L) 3 88 - 5 62 Million/uL    Hemoglobin 10 3 (L) 12 0 - 17 0 g/dL    Hematocrit 28 6 (L) 36 5 - 49 3 %    MCV 94 82 - 98 fL    MCH 33 9 26 8 - 34 3 pg    MCHC 36 0 31 4 - 37 4 g/dL    RDW 15 3 (H) 11 6 - 15 1 %    MPV 11 6 8 9 - 12 7 fL    Platelets 561 568 - 417 Thousands/uL    nRBC 0 /100 WBCs    Neutrophils Relative 83 (H) 43 - 75 %    Immat GRANS % 0 0 - 2 %    Lymphocytes Relative 9 (L) 14 - 44 %    Monocytes Relative 7 4 - 12 %    Eosinophils Relative 1 0 - 6 %    Basophils Relative 0 0 - 1 %    Neutrophils Absolute 8 15 (H) 1 85 - 7 62 Thousands/µL    Immature Grans Absolute 0 03 0 00 - 0 20 Thousand/uL    Lymphocytes Absolute 0 87 0 60 - 4 47 Thousands/µL    Monocytes Absolute 0 66 0 17 - 1 22 Thousand/µL    Eosinophils Absolute 0 06 0 00 - 0 61 Thousand/µL    Basophils Absolute 0 02 0 00 - 0 10 Thousands/µL   Comprehensive metabolic panel    Collection Time: 03/08/22  1:10 PM   Result Value Ref Range    Sodium 148 (H) 136 - 145 mmol/L    Potassium 3 4 (L) 3 5 - 5 3 mmol/L    Chloride 115 (H) 100 - 108 mmol/L    CO2 23 21 - 32 mmol/L    ANION GAP 10 4 - 13 mmol/L    BUN 51 (H) 5 - 25 mg/dL    Creatinine 1 66 (H) 0 60 - 1 30 mg/dL    Glucose 37 (LL) 65 - 140 mg/dL    Calcium 9 2 8 3 - 10 1 mg/dL    Corrected Calcium 10 3 (H) 8 3 - 10 1 mg/dL    AST 55 (H) 5 - 45 U/L    ALT 65 12 - 78 U/L    Alkaline Phosphatase 116 46 - 116 U/L    Total Protein 5 8 (L) 6 4 - 8 2 g/dL    Albumin 2 6 (L) 3 5 - 5 0 g/dL    Total Bilirubin 0 31 0 20 - 1 00 mg/dL    eGFR 37 ml/min/1 73sq m   TSH    Collection Time: 03/08/22  1:10 PM   Result Value Ref Range    TSH 3RD GENERATON 4 983 (H) 0 358 - 3 740 uIU/mL   Lactic acid    Collection Time: 03/08/22  1:10 PM   Result Value Ref Range    LACTIC ACID 2 3 (HH) 0 5 - 2 0 mmol/L   Ethanol    Collection Time: 03/08/22  1:10 PM   Result Value Ref Range    Ethanol Lvl <3 0 - 3 mg/dL   Salicylate level    Collection Time: 03/08/22  1:10 PM   Result Value Ref Range    Salicylate Lvl <3 (L) 3 - 20 mg/dL   Acetaminophen level-If concentration is detectable, please discuss with medical  on call  Collection Time: 03/08/22  1:10 PM   Result Value Ref Range    Acetaminophen Level <2 (L) 10 - 20 ug/mL   Protime-INR    Collection Time: 03/08/22  1:11 PM   Result Value Ref Range    Protime 14 6 (H) 11 6 - 14 5 seconds    INR 1 14 0 84 - 1 19   APTT    Collection Time: 03/08/22  1:11 PM   Result Value Ref Range    PTT 55 (H) 23 - 37 seconds   Procalcitonin with AM Reflex    Collection Time: 03/08/22  1:11 PM   Result Value Ref Range    Procalcitonin 0 08 <=0 25 ng/ml   HS Troponin 0hr (reflex protocol)    Collection Time: 03/08/22  1:11 PM   Result Value Ref Range    hs TnI 0hr 17 "Refer to ACS Flowchart"- see link ng/L   COVID/FLU/RSV - 2 hour TAT    Collection Time: 03/08/22  1:11 PM    Specimen: Nose; Nares   Result Value Ref Range    SARS-CoV-2 Negative Negative    INFLUENZA A PCR Negative Negative    INFLUENZA B PCR Negative Negative    RSV PCR Negative Negative   Blood culture #1    Collection Time: 03/08/22  1:16 PM    Specimen: Arm, Left; Blood   Result Value Ref Range    Blood Culture Received in Microbiology Lab  Culture in Progress      Blood culture #2    Collection Time: 03/08/22  1:16 PM    Specimen: Arm, Right; Blood   Result Value Ref Range    Blood Culture Received in Microbiology Lab  Culture in Progress      Rapid drug screen, urine    Collection Time: 03/08/22  1:22 PM   Result Value Ref Range    Amph/Meth UR Negative Negative    Barbiturate Ur Negative Negative    Benzodiazepine Urine Negative Negative    Cocaine Urine Negative Negative    Methadone Urine Negative Negative    Opiate Urine Negative Negative    PCP Ur Negative Negative    THC Urine Negative Negative    Oxycodone Urine Negative Negative   UA w Reflex to Microscopic w Reflex to Culture    Collection Time: 03/08/22  1:22 PM    Specimen: Urine, Indwelling Santamaria Catheter   Result Value Ref Range    Color, UA Yellow     Clarity, UA Clear     Specific Gravity, UA 1 025 1 003 - 1 030    pH, UA 5 0 4 5, 5 0, 5 5, 6 0, 6 5, 7 0, 7 5, 8 0    Leukocytes, UA Negative Negative    Nitrite, UA Negative Negative    Protein, UA Negative Negative mg/dl    Glucose, UA Negative Negative mg/dl    Ketones, UA 15 (1+) (A) Negative mg/dl    Urobilinogen, UA 0 2 0 2, 1 0 E U /dl E U /dl    Bilirubin, UA Negative Negative    Blood, UA Moderate (A) Negative   Urine Microscopic    Collection Time: 03/08/22  1:22 PM   Result Value Ref Range    RBC, UA 2-4 None Seen, 0-1, 1-2, 2-4, 0-5 /hpf    WBC, UA 0-1 None Seen, 0-1, 1-2, 0-5, 2-4 /hpf    Epithelial Cells Occasional None Seen, Occasional /hpf    Bacteria, UA Occasional None Seen, Occasional /hpf    Hyaline Casts, UA 0-1 (A) (none) /lpf   Fingerstick Glucose (POCT)    Collection Time: 03/08/22  1:34 PM   Result Value Ref Range    POC Glucose 30 (LL) 65 - 140 mg/dl   Fingerstick Glucose (POCT)    Collection Time: 03/08/22  1:56 PM   Result Value Ref Range    POC Glucose 295 (H) 65 - 140 mg/dl   Fingerstick Glucose (POCT)    Collection Time: 03/08/22  2:18 PM   Result Value Ref Range    POC Glucose 354 (H) 65 - 140 mg/dl   Fingerstick Glucose (POCT)    Collection Time: 03/08/22  3:04 PM   Result Value Ref Range    POC Glucose 22 (LL) 65 - 140 mg/dl   HS Troponin I 2hr    Collection Time: 03/08/22  3:20 PM   Result Value Ref Range    hs TnI 2hr 15 "Refer to ACS Flowchart"- see link ng/L    Delta 2hr hsTnI -2 <20 ng/L   Lactic acid 2 Hours    Collection Time: 03/08/22  3:20 PM   Result Value Ref Range    LACTIC ACID 2 0 0 5 - 2 0 mmol/L   Fingerstick Glucose (POCT)    Collection Time: 03/08/22  3:58 PM   Result Value Ref Range    POC Glucose 211 (H) 65 - 140 mg/dl   ]  Imaging Studies: I have personally reviewed pertinent reports and I have personally reviewed pertinent films in PACS  EKG, Pathology, and Other Studies: I have personally reviewed pertinent reports  VTE Prophylaxis: Patient in ED, will be placed on DVT prophylaxis once admitted to the floor    Dictation voice to text software has been used in the creation of this document  Please consider this in light of any contextual or grammatical errors

## 2022-03-09 NOTE — TELEPHONE ENCOUNTER
Spoke with facility nurse to confirm upcoming appointment with Varsha Hernandez on the 21st  She stated she prefers to have Shellia Brine come in after infusion is complete (03-24) Patient was then rescheduled to 03-29 at 8:45am  They are aware of location date and time and have no other issues or concerns at this time

## 2022-03-09 NOTE — UTILIZATION REVIEW
Initial Clinical Review    Admission: Date/Time/Statement:   Admission Orders (From admission, onward)     Ordered        03/08/22 1700  Inpatient Admission  Once                      Orders Placed This Encounter   Procedures    Inpatient Admission     Standing Status:   Standing     Number of Occurrences:   1     Order Specific Question:   Level of Care     Answer:   Level 1 Stepdown [13]     Order Specific Question:   Estimated length of stay     Answer:   More than 2 Midnights     Order Specific Question:   Certification     Answer:   I certify that inpatient services are medically necessary for this patient for a duration of greater than two midnights  See H&P and MD Progress Notes for additional information about the patient's course of treatment  ED Arrival Information     Expected Arrival Acuity    - 3/8/2022 12:14 Emergent         Means of arrival Escorted by Service Admission type    Ambulance [facility] Critical Care/ICU Emergency         Arrival complaint            Chief Complaint   Patient presents with    Altered Mental Status     Pt comes from [address] [place] [address] via EMS  Staff at facility said he was acting "strange" between the hours of 11p-7a (combative, spitting, altered mental status)  Blood sugar was 30 around 1030am, given IM glucagon  Hx of this behavior with low BS  EMS administred 200mL D10 in transit  Unknown baseline GCS  Pleasant and cvooperative normally per facility    Hypoglycemia - Symptomatic       Initial Presentation: this is a 81year old male from SNF to ED admitted inpatient due to encephalopathy/hypoglycemia/sepsis/myasthenia gravis  PMH of myasthenia gravis, [place], HTN, baseline dysphagia, COPD, sleep apnea, CKD stage 3, prostate cancer  Presented due to altered mental status staring evening prior to arrival, on day of arrival increasing combativeness, refusing medications  Hypoglycemic to 38  Per ems given glucagon and D10  On exam hypothermic, bradycardic  Initially unresponsive to verbal stimuli and withdrew tactile stimuli  Lactic acid 2 3  Glucose 37  Bun 51, creatinine 1 66  In the ED placed on [facility],  Given bolus of dextrose for hypoglycemia, started on IVF, given Octreotide and ceftriaxone and vancomycin  Consult neurology and toxicology  Plan is hourly accucheck, IVF of D51/2NS, check C peptide  Continue Terence mayer  Follow blood cultures  Start IVIG       3/8/22 per neurology -  Patient encephalopathic and suspect toxic metabolic encephalopathy with underlying myasthenia gravis  plan is IVIG infusion       3/8/22 per toxicology - patient with recurrent hypoglycemia, hypothermia and encephalopathy  Differential is sepsis and encephalopathy and hypothermia are due to hypoglycemia  Give Octreotide if glucose does not become stable with dextrose IVF  Not on any antidiabetic medications, no diabetes, question if received another patient's medications   Sepsis work up  Date: 3/9/22   Day 2: not talking  Overnight continued on D10 for hypoglycemia  Was spitting, aggressive and given haldol  On exam appears ill  Lungs decreased breath sounds  Not following commands  Moves all extremities  Glucose 100 to 59  Bun 36, creatinine 1 70  Procalcitonin 0 60 Continue D10  Given another dose of rocephin and vancomycin  Continue IVIG        ED Triage Vitals   Temperature Pulse Respirations Blood Pressure SpO2   03/08/22 1245 03/08/22 1220 03/08/22 1220 03/08/22 1220 03/08/22 1220   (S) (!) 92 7 °F (33 7 °C) 68 16 124/58 100 %      Temp Source Heart Rate Source Patient Position - Orthostatic VS BP Location FiO2 (%)   03/08/22 1245 03/08/22 1220 03/08/22 1220 03/08/22 1220 --   Rectal Monitor Sitting Left arm       Pain Score       03/08/22 1220       No Pain          Wt Readings from Last 1 Encounters:   03/08/22 73 kg (160 lb 15 oz)     Additional Vital Signs:   03/09/22 0900 97 2 °F (36 2 °C) Abnormal  86 21 106/58 77 -- -- Lying 03/09/22 0800 97 °F (36 1 °C) Abnormal  80 18 -- -- 96 % -- --   03/09/22 0700 97 2 °F (36 2 °C) Abnormal  71 24 Abnormal  124/58 84 96 % -- --   03/09/22 0600 97 °F (36 1 °C) Abnormal  72 21 119/58 83 -- -- --   03/09/22 0500 96 8 °F (36 °C) Abnormal  75 19 148/85 101 96 % -- --   03/09/22 0400 96 6 °F (35 9 °C) Abnormal  74 18 131/60 87 96 % -- --   03/09/22 0300 96 6 °F (35 9 °C) Abnormal  74 20 133/62 89 96 % -- --   03/09/22 0200 96 8 °F (36 °C) Abnormal  68 17 114/64 84 97 % -- --   03/09/22 0100 96 8 °F (36 °C) Abnormal  81 21 155/60 86 96 % -- --   03/09/22 0000 96 8 °F (36 °C) Abnormal  63 15 107/53 76 96 % -- --   03/08/22 2000 97 6 °F (36 4 °C) 78 25 Abnormal  126/62 88 96 % -- --   03/08/22 1700 95 4 °F (35 2 °C) Abnormal  66 -- 106/57 77 97 % -- --   03/08/22 1645 95 °F (35 °C) Abnormal  66 -- 103/57 76 97 % -- --   03/08/22 1630 94 6 °F (34 8 °C) Abnormal  66 -- 105/57 78 97 % -- --   03/08/22 1500 93 6 °F (34 2 °C) Abnormal  64 16 132/63 91 98 % None (Room air) Lying   03/08/22 1400 92 8 °F (33 8 °C) Abnormal  52 Abnormal  16 101/52 75 98 % None (Room air) Lying   03/08/22 1345 92 5 °F (33 6 °C) Abnormal  46 Abnormal  16 91/48 Abnormal  66 97 % None (Room air) Lying   03/08/22 1330 92 1 °F (33 4 °C) Abnormal   -- -- -- -- -- -- --       Pertinent Labs/Diagnostic Test Results:   CT head without contrast   Final Result by Padmini Burton MD (03/08 1527)      No acute intracranial abnormality  Workstation performed: [id number]         CT chest abdomen pelvis w contrast   Final Result by Padmini Burton MD (03/08 1534)      Small bibasilar pleural effusions with cardiomegaly  13 mm right upper lobe groundglass opacity #3/29  Follow-up with repeat CT chest in one year  No acute findings in the abdomen or the pelvis  The study was marked in [facility] for immediate notification        Workstation performed: [id number]           3/8/22 ecg - Junctional rhythm with occasional Premature ventricular complexes  Nonspecific ST and T wave abnormality  Abnormal ECG  No previous ECGs available    3/8/22 ecg - Sinus rhythm with 1st degree A-V block with a competing junctional pacemaker  Nonspecific ST and T wave abnormality , probably digitalis effect  Abnormal ECG  Sinus rhythm is now Present    3/9/22 ecg - Sinus rhythm with 1st degree A-V block with frequent Premature ventricular complexes  Nonspecific ST and T wave abnormality  Abnormal ECG  When compared with ECG of 08-MAR-2022 13:34, (unconfirmed)  Premature ventricular complexes are now Present    3/9/22 ecg - Sinus rhythm with 1st degree A-V block with occasional Premature ventricular complexes  Nonspecific ST and T wave abnormality  Abnormal ECG  When compared with ECG of 09-MAR-2022 07:16, (unconfirmed)  No significant change was found    Results from last 7 days   Lab Units 03/08/22  1311   SARS-COV-2  Negative     Results from last 7 days   Lab Units 03/09/22  0720 03/08/22  2212 03/08/22  1310   WBC Thousand/uL 8 61  --  9 79   HEMOGLOBIN g/dL 8 9*  --  10 3*   HEMATOCRIT % 25 0*  --  28 6*   PLATELETS Thousands/uL 149 175 165   NEUTROS ABS Thousands/µL 7 38  --  8 15*     Results from last 7 days   Lab Units 03/09/22  0603 03/08/22  1310   SODIUM mmol/L 142 148*   POTASSIUM mmol/L 3 9 3 4*   CHLORIDE mmol/L 112* 115*   CO2 mmol/L 20* 23   ANION GAP mmol/L 10 10   BUN mg/dL 36* 51*   CREATININE mg/dL 1 70* 1 66*   EGFR ml/min/1 73sq m 35 37   CALCIUM mg/dL 8 5 9 2     Results from last 7 days   Lab Units 03/09/22  0603 03/08/22  1310   AST U/L 42 55*   ALT U/L 49 65   ALK PHOS U/L 95 116   TOTAL PROTEIN g/dL 6 4 5 8*   ALBUMIN g/dL 2 0* 2 6*   TOTAL BILIRUBIN mg/dL 0 28 0 31     Results from last 7 days   Lab Units 03/09/22  0950 03/09/22  0858 03/09/22  0751 03/09/22  0718 03/09/22  4913 03/09/22  0535 03/09/22  0415 03/09/22  0247 03/09/22  0202 03/09/22  0105 03/09/22  0018 03/08/22  2226   POC GLUCOSE mg/dl 97 84 91 88 72 101 82 123 78 125 100 77     Results from last 7 days   Lab Units 03/09/22  0603 03/08/22  1310   GLUCOSE RANDOM mg/dL 59* 37*     Results from last 7 days   Lab Units 03/08/22  1651 03/08/22  1520 03/08/22  1311   HS TNI 0HR ng/L  --   --  17   HS TNI 2HR ng/L  --  15  --    HSTNI D2 ng/L  --  -2  --    HS TNI 4HR ng/L 19  --   --    HSTNI D4 ng/L 2  --   --      Results from last 7 days   Lab Units 03/08/22  1311   PROTIME seconds 14 6*   INR  1 14   PTT seconds 55*     Results from last 7 days   Lab Units 03/08/22  1310   TSH 3RD GENERATON uIU/mL 4 983*     Results from last 7 days   Lab Units 03/09/22  0603 03/08/22  1311   PROCALCITONIN ng/ml 0 60* 0 08     Results from last 7 days   Lab Units 03/08/22  1520 03/08/22  1310   LACTIC ACID mmol/L 2 0 2 3*     Results from last 7 days   Lab Units 03/08/22  1322   CLARITY UA  Clear   COLOR UA  Yellow   SPEC GRAV UA  1 025   PH UA  5 0   GLUCOSE UA mg/dl Negative   KETONES UA mg/dl 15 (1+)*   BLOOD UA  Moderate*   PROTEIN UA mg/dl Negative   NITRITE UA  Negative   BILIRUBIN UA  Negative   UROBILINOGEN UA E U /dl 0 2   LEUKOCYTES UA  Negative   WBC UA /hpf 0-1   RBC UA /hpf 2-4   BACTERIA UA /hpf Occasional   EPITHELIAL CELLS WET PREP /hpf Occasional     Results from last 7 days   Lab Units 03/08/22  1311   INFLUENZA A PCR  Negative   INFLUENZA B PCR  Negative   RSV PCR  Negative     Results from last 7 days   Lab Units 03/08/22  1322   AMPH/METH  Negative   BARBITURATE UR  Negative   BENZODIAZEPINE UR  Negative   COCAINE UR  Negative   METHADONE URINE  Negative   OPIATE UR  Negative   PCP UR  Negative   THC UR  Negative     Results from last 7 days   Lab Units 03/08/22  1310   ETHANOL LVL mg/dL <3   ACETAMINOPHEN LVL ug/mL <2*   SALICYLATE LVL mg/dL <3*     Results from last 7 days   Lab Units 03/08/22  1316   BLOOD CULTURE  Received in Microbiology Lab  Culture in Progress  Received in Microbiology Lab  Culture in Progress       ED Treatment: Medication Administration from 03/08/2022 1213 to 03/08/2022 1825       Date/Time Order Dose Route Action Comments     03/08/2022 1318 sodium chloride 0 9 % bolus 500 mL 500 mL Intravenous New Bag      03/08/2022 1339 dextrose infusion 10 % bolus 250 mL Intravenous New Bag      03/08/2022 1503 dextrose 5 % and sodium chloride 0 9 % infusion 125 mL/hr Intravenous New Bag      03/08/2022 1524 octreotide (SandoSTATIN) injection 50 mcg 50 mcg Intravenous Given      03/08/2022 1526 cefTRIAXone (ROCEPHIN) 2,000 mg in dextrose 5 % 50 mL IVPB 2,000 mg Intravenous New Bag      03/08/2022 1658 vancomycin (VANCOCIN) 1500 mg in sodium chloride 0 9% 250 mL IVPB 1,500 mg Intravenous New Bag      03/08/2022 1510 dextrose infusion 10 % bolus 250 mL Intravenous New Bag      03/08/2022 1654 dextrose 5 % and sodium chloride 0 45 % infusion 100 mL/hr Intravenous New Bag      03/08/2022 1709 dextrose infusion 10 % bolus 100 mL Intravenous New Bag PARTIAL        Past Medical History:   Diagnosis Date    Cancer ([place] [address] )     Kidney disease     SYED (obstructive sleep apnea)     Prostate cancer ([place] [address] )     Rotator cuff rupture 10/20/2006     Present on Admission:   Encephalopathy   [facility]   Stage 3 chronic kidney disease ([facility] )   Malignant neoplasm of prostate ([facility] )   Pancreatic lesion   SYED (obstructive sleep apnea)   Chronic anemia   Benign prostatic hyperplasia   Hypertension   COPD [facility]chronic obstructive pulmonary disease) ([facility])   Sepsis ([facility] )   Hypoglycemia   Elevated TSH      Admitting Diagnosis: Encephalopathy [G93 40]  MG (myasthenia gravis) ([facility] ) [G70 00]  Age/Sex: 81 y o  male  Admission Orders:  3/8/22 1700 inpatient   Scheduled Medications:  cefTRIAXone, 2,000 mg, Intravenous, Once - 1500 3/9/22   heparin (porcine), 5,000 Units, Subcutaneous, Q8H [address]  immune globulin, human, 333 mg/kg (Ideal), Intravenous, Q24H  ipratropium, 0 5 mg, Nebulization, TID  levalbuterol, 1 25 mg, Nebulization, TID  pantoprazole, 40 mg, Intravenous, Q12H SANKET  vancomycin, 15 mg/kg, Intravenous, Once - 1500 3/9    fentanyl citrate (PF) 100 MCG/2ML 50 mcg  Dose: 50 mcg  Freq: Once Route: IV  Start: 03/09/22 0600 End: 03/09/22 0614    haloperidol lactate (HALDOL) injection 2 mg  Dose: 2 mg  Freq: Once Route: IV  Indications of Use: DELIRIUM  Start: 03/08/22 2215 End: 03/08/22 2217    haloperidol lactate (HALDOL) injection 5 mg  Dose: 5 mg  Freq: Once Route: IV  Start: 03/09/22 0100 End: 03/09/22 0106    Continuous IV Infusions:  Dextrose,10%,  100 mL/hr, Intravenous, Continuous    dextrose 5 % and sodium chloride 0 45 % infusion  Rate: 150 mL/hr Dose: 150 mL/hr  Freq: Continuous Route: IV  Indications of Use: IV Hydration  Last Dose: Stopped (03/09/22 0647)  Start: 03/08/22 1645 End: 03/08/22 1955    PRN Meds: not used   sodium chloride (PF), 3 mL, Intravenous, Q1H PRN    restraints non violent  Neuro checks every 2 hours  Cardio pulmonary monitoring  Santamaria catheter  NPO  Cpap at bedtime    IP CONSULT TO TOXICOLOGY  IP CONSULT TO NEUROLOGY      Network Utilization Review Department  ATTENTION: Please call with any questions or concerns to [phone] and carefully listen to the prompts so that you are directed to the right person  All voicemails are confidential   Vida Skipper all requests for admission clinical reviews, approved or denied determinations and any other requests to dedicated fax number below belonging to the campus where the patient is receiving treatment   List of dedicated fax numbers for the Facilities:  [address] DENIALS (Administrative/Medical Necessity) [phone]   [address] [place] [address] (Maternity/NICU/Pediatrics) 261 [facility],[address] Nathalie [facility] [id number] [place] [address] Kirsten Srinivasan [phone] [place] [id number] [phone]

## 2022-03-09 NOTE — H&P
[facility]&[address] 1935, 81 y o  male MRN: [id number]  Unit/Bed#: S -Roc Encounter: [id number]  Primary Care Provider: Zachariah Tierney MD   Date and time admitted to hospital: 3/8/2022 12:14 PM    * Encephalopathy  Assessment & Plan  18-year-old male presented to the ED via AMS from nursing home for hypoglycemia in the 30s that persisted after glucagon administration  Also having altered mental status  Patient was found hypothermic, 92 7  Pulse 30-60s  Blood pressures averaging in 90s/50s, satting >96% on RA  Patient is not on diabetes medications and neither has a history of diabetes  Blood sugars improved after D10 bolus into the 200-300s however continued blood sugar checks were hypoglycemic 22-53  POA unresponsive, was not speaking however improved and was Aaox4  DDx encephalopathy 2/2 metabolic derangements, hypothermia, infection, IVIG noncompliance for treatment of MG, medication error  Plan  · Admit to CC SD1  · Hypoglycemia  · Q1H accucheck  · D51/2NS @ 100 ml/hr  · Pending C-peptide  · Monitor BMP  · Monitor temperature  · Continue gamaliel hugger  · Pending cortisol  · F/u blood cultures  · S/p ceftriaxone and vancomycin x1 in ED  · PCT 0 08  · Pending AM reflex  · U/A remarkable for 1+ ketone, negative for leukocytes/nitrites      Sepsis [facility])  Assessment & Plan  Hypothermic, hypotensive    Plan: See above for plan      Chronic anemia  Assessment & Plan  Lab Results   Component Value Date    HGB 10 3 (L) 03/08/2022   Per CareEverywhere chart review, patient Hgb baseline 10    Plan:  · Monitor CBC, transfuse Hgb <7      SYED (obstructive sleep apnea)  Assessment & Plan  · CPAP qhs    Stage 3 chronic kidney disease ([facility] [address] )  Assessment & Plan  Lab Results   Component Value Date    CREATININE 1 66 (H) 03/08/2022     Per chart review, baseline Cr 1 5-1 6      Plan:  · Monitor Cr    MG (myasthenia gravis) ([facility] [address] )  Assessment & Plan  Diagnosed over 6 years ago, follows with [facility] neurology and Hematology  Last IVIG infusion documented December 2021, patient is supposed to receive treatments every 4-6 hours per chart review  Patient has an appointment with [facility] on March 22, 2022 for IVIG infusion  Plan  · Begin IVIG  · Monitor O2  · Pending cortisol, Ach-R and MUSK antibodies    COPD (chronic obstructive pulmonary disease) ([address] )  Assessment & Plan  Takes duonebs at home    Plan  · Xopenex and Atrovent TID    Hypertension  Assessment & Plan  Hypotensive POA    Plan:  · Hold home Norvasc    Pancreatic lesion  Assessment & Plan  Per chart review 12/2021 - "While inpatient at [facility] he had a CT scan abdomen was concerning for pancreatic mass  MRI noted for 5mm cystic lesion not changed from prior  Continue conservative management at this time "  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    Plan  · 3/8/22 CT c/a/p: PANCREAS:  Unremarkable    Malignant neoplasm of prostate [facility])  Assessment & Plan  Prostate cancer s/p seed implants in 2006  Plan  · Hold home terazosin until HDS    Benign prostatic hyperplasia  Assessment & Plan  PMHx of prostate cancer 2006 s/p seed implants    Plan  · Hold home terazosin until HDS      -------------------------------------------------------------------------------------------------------------  Chief Complaint: Hypoglycemia and AMS from [address]    History of Present Illness   HX and PE limited by: Ceci Rabago is a 81 y o  male who presents with PMHx of Myasthenia Gravis, CAD, HTN, COPD, BPH, Prostate cancer in 2006 s/p seed implants, benign pulmonary nodules, SYED on CPAP, presbycusis, no history of DM arrived via EMS from [address] [place] [address]  Overnight shift noticed blood sugars in 30s and gave glucacon  Recheck this AM was also 30s and patient also was combative and exhibited AMS  POA - hypothermic 92 7, improved with gamaliel hugger   HR ranged from 30-60s with linear improvement with increase in core temperature  Blood pressures in the /48-63  Breathing SpO2 % RA  Patient's blood sugars consistently hypoglycemic x3 (53, 22, 30) here after dextrose boluses  Patient receiving D51/2NS @ 100 ml/hr currently with q1 accucheks  Patient is awake and alert however does not respond to verbal commands  Squeezes my fingers immediately but does not let go  Spoke with patient's son - patient at baseline is high functioning  Son notes that mental status has been worsening since this past Sunday  Has been having difficulty getting insurance authorization for IVIG treatments  Confirms that last infusion was November 2021 and should be getting them every 4-6 weeks  Per chart review, patient's son has stated that patient has had a similar presentation whenever patient has been noncompliant with IVIG infusions  Followed by [facility] neurology and hematology  Last infusion November 2021, recommendations for patient to receive infusions every 4-6 weeks  Has future IVIG infusion scheduled for March 22, 2022 with [facility] neurology  ED workup: Lactate 2 3, procalcitonin WNL, electrolyte abnormalities, baseline creatinine 1 5-1 6  Given D10 bolus x2  BS rises to 200-300s then falls back to 30-50  Given atropine 0 5 mg x1 with HR increase into 60s  Vancomycin x1  CT-head: no acute intracranial abnormalities  CT c/a/p: small bibasilar pleural effusions w/ cardiomegaly  13mm RUL gronudglass opacity with recommendations to f/u repeat CT in 1 year  No acute findings in abdomen or pelvis  Remote 15 year smoking history, quit in 2001  History obtained from chart review and unobtainable from patient due to mental status and lack of cooperation   -------------------------------------------------------------------------------------------------------------  Dispo:  Admit to Stepdown Level 1    Code Status: No Order  --------------------------------------------------------------------------------------------------------------  Review of Systems    Review of systems was unable to be performed secondary to AMS    Physical Exam  Vitals and nursing note reviewed  Constitutional:       General: He is not in acute distress  Appearance: He is well-developed  HENT:      Head: Normocephalic and atraumatic  Right Ear: External ear normal       Left Ear: External ear normal       Nose: Nose normal    Eyes:      Conjunctiva/sclera: Conjunctivae normal    Cardiovascular:      Rate and Rhythm: Normal rate and regular rhythm  Pulses: Normal pulses  Heart sounds: Normal heart sounds  No murmur heard  Pulmonary:      Effort: Pulmonary effort is normal  No respiratory distress  Breath sounds: Normal breath sounds  Abdominal:      General: Abdomen is flat  There is no distension  Palpations: Abdomen is soft  Tenderness: There is no abdominal tenderness  There is no guarding  Musculoskeletal:      Cervical back: Neck supple  Right lower leg: No edema  Left lower leg: No edema  Skin:     General: Skin is warm and dry  Capillary Refill: Capillary refill takes less than 2 seconds  Findings: No rash  Neurological:      Mental Status: He is alert        Comments: Does not respond to commands  Per son, patient is not at baseline  At baseline, patient is high-functioning       --------------------------------------------------------------------------------------------------------------  Vitals:   Vitals:    03/08/22 1700 03/08/22 1715 03/08/22 1730 03/08/22 1745   BP: 106/57 95/55 119/57 100/53   BP Location:  Right arm  Right arm   Pulse: 66 66 72 72   Resp:  16  16   Temp: (!) 95 4 °F (35 2 °C) (!) 95 4 °F (35 2 °C) (!) 95 7 °F (35 4 °C) (!) 96 1 °F (35 6 °C)   TempSrc:       SpO2: 97% 97% 97% 97%   Weight:       Height:         Temp  Min: 92 1 °F (33 4 °C)  Max: 96 1 °F (35 6 °C)  IBW (Ideal Body Weight): 61 5 kg  Height: 5' 5" (165 1 cm)  Body mass index is 26 78 kg/m²  Laboratory and Diagnostics:  Results from last 7 days   Lab Units 03/08/22  1310   WBC Thousand/uL 9 79   HEMOGLOBIN g/dL 10 3*   HEMATOCRIT % 28 6*   PLATELETS Thousands/uL 165   NEUTROS PCT % 83*   MONOS PCT % 7     Results from last 7 days   Lab Units 03/08/22  1310   SODIUM mmol/L 148*   POTASSIUM mmol/L 3 4*   CHLORIDE mmol/L 115*   CO2 mmol/L 23   ANION GAP mmol/L 10   BUN mg/dL 51*   CREATININE mg/dL 1 66*   CALCIUM mg/dL 9 2   GLUCOSE RANDOM mg/dL 37*   ALT U/L 65   AST U/L 55*   ALK PHOS U/L 116   ALBUMIN g/dL 2 6*   TOTAL BILIRUBIN mg/dL 0 31          Results from last 7 days   Lab Units 03/08/22  1311   INR  1 14   PTT seconds 55*          Results from last 7 days   Lab Units 03/08/22  1520 03/08/22  1310   LACTIC ACID mmol/L 2 0 2 3*     ABG:    VBG:    Results from last 7 days   Lab Units 03/08/22  1311   PROCALCITONIN ng/ml 0 08       Micro:  Results from last 7 days   Lab Units 03/08/22  1316   BLOOD CULTURE  Received in Microbiology Lab  Culture in Progress  Received in Microbiology Lab  Culture in Progress  EKG: NSR 60  Imaging: I have personally reviewed pertinent reports  and I have personally reviewed pertinent films in PACS      Historical Information   Past Medical History:   Diagnosis Date    Cancer ([facility] )     Kidney disease     SYED (obstructive sleep apnea)     Prostate cancer ([facility] )     Rotator cuff rupture 10/20/2006     History reviewed  No pertinent surgical history    Social History   Social History     Substance and Sexual Activity   Alcohol Use Never     Social History     Substance and Sexual Activity   Drug Use Never     Social History     Tobacco Use   Smoking Status Former Smoker    Types: Pipe   Smokeless Tobacco Former User     Exercise History: none  Family History:   Family History   Problem Relation Age of Onset    Diabetes Mother     Cancer Father      I have reviewed this patient's family history and commented on sigificant items within the HPI      Medications:  Current Facility-Administered Medications   Medication Dose Route Frequency    dextrose 5 % and sodium chloride 0 45 % infusion  150 mL/hr Intravenous Continuous    dextrose infusion 10 % bolus  250 mL Intravenous Once    immune globulin, human (GAMUNEX-C) 20 g 200 mL IV soln  333 mg/kg (Ideal) Intravenous Q24H    sodium chloride (PF) 0 9 % injection 3 mL  3 mL Intravenous Q1H PRN     Home medications:  Prior to Admission Medications   Prescriptions Last Dose Informant Patient Reported? Taking?    Calcium Carbonate-Vitamin D 600-400 MG-UNIT per tablet   Yes No   Sig: Take by mouth   Diclofenac Sodium (VOLTAREN) 1 %   Yes No   Sig: Apply 2 g topically daily To B/L UE   Doxepin HCl 6 MG TABS   Yes No   Sig: Take 6 mg by mouth daily as needed   Patient not taking: Reported on 2/16/2022    Multiple Vitamins-Minerals (PreserVision AREDS) TABS   Yes No   Sig: Take 1 tablet by mouth daily   amLODIPine (NORVASC) 5 mg tablet   Yes No   Sig: Take 5 mg by mouth daily   cyanocobalamin (VITAMIN B-12) 100 MCG tablet   Yes No   Sig: Take by mouth   ergocalciferol (ERGOCALCIFEROL) 1 25 MG (68188 UT) capsule   Yes No   Sig: Take 50,000 Units by mouth once a week Every Monday   esomeprazole (NexIUM) 40 mg packet   Yes No   gabapentin (NEURONTIN) 300 mg capsule   Yes No   Sig: Take 300 mg by mouth daily at bedtime   ipratropium-albuterol (DUO-NEB) 0 5-2 5 mg/3 mL nebulizer solution   Yes No   Sig: Take 3 mL by nebulization 4 (four) times a day   ipratropium-albuterol (DUO-NEB) 0 5-2 5 mg/3 mL nebulizer solution   Yes No   Sig: Take 3 mL by nebulization every 6 (six) hours as needed   pantoprazole (PROTONIX) 40 mg tablet   Yes No   Sig: Take 40 mg by mouth 2 (two) times a day   sucralfate (CARAFATE) 1 g tablet   Yes No   Sig: Take 1 g by mouth 4 (four) times a day   terazosin (HYTRIN) 10 MG capsule   Yes No   Sig: Take 5 mg by mouth daily at bedtime       Facility-Administered Medications: None     Allergies:  No Known Allergies    ------------------------------------------------------------------------------------------------------------  Advance Directive and Living Will:      Power of :    POLST:    ------------------------------------------------------------------------------------------------------------  Anticipated Length of Stay is > 2 midnights    Care Time Delivered:   per ICU attending      Luz Marina Aguirre MD        Portions of the record may have been created with voice recognition software  Occasional wrong word or "sound a like" substitutions may have occurred due to the inherent limitations of voice recognition software    Read the chart carefully and recognize, using context, where substitutions have occurred

## 2022-03-09 NOTE — QUICK NOTE
Contacted patient's son Tong Perry and updated him on current condition and plan  Addressed his concerns and questions

## 2022-03-09 NOTE — ASSESSMENT & PLAN NOTE
Lab Results   Component Value Date    POCGLU 145 (H) 03/08/2022    POCGLU 56 (L) 03/08/2022    POCGLU 163 (H) 03/08/2022    POCGLU 53 (L) 03/08/2022    POCGLU 211 (H) 03/08/2022    POCGLU 22 (LL) 03/08/2022    POCGLU 354 (H) 03/08/2022    POCGLU 295 (H) 03/08/2022    POCGLU 30 (LL) 03/08/2022    POCGLU 169 (H) 03/08/2022     Unclear reason as to why patient continues to become hypoglycemic after dextrose boluses  Ongoing investigation      Plan:  See above for Encephalopathy

## 2022-03-09 NOTE — PROGRESS NOTES
[facility]  Progress Note - Melinda Howard 1935, 81 y o  male MRN: [id number]  Unit/Bed#: S -Roc Encounter: [id number]  Primary Care Provider: Jasmin Levine MD   Date and time admitted to hospital: 3/8/2022 12:14 PM    * Encephalopathy  Assessment & Plan  69-year-old male presented to the ED via AMS from nursing home for hypoglycemia in the 30s that persisted after glucagon administration  Also having altered mental status  Patient was found hypothermic, 92 7  Pulse 30-60s  Blood pressures averaging in 90s/50s, satting >96% on RA  Patient is not on diabetes medications and neither has a history of diabetes  Blood sugars improved after D10 bolus into the 200-300s however continued blood sugar checks were hypoglycemic 22-53  POA unresponsive, was not speaking however improved and was Aaox4  DDx encephalopathy 2/2 metabolic derangements, hypothermia, infection, IVIG noncompliance for treatment of MG, medication error       Plan  · Continue SD 1  · Hypoglycemia  · Continue Q1H accucheck  · D10 @ 100 ml/hr  · Pending C-peptide  · Hypoglycemic protocol  · Monitor CMP  · Monitor temperature  · Continue gamaliel hugger  · Monitor temperature  · Cortisol Normal  · F/u blood cultures  · S/p ceftriaxone and vancomycin x1 in ED @ 1500  · 3/9: Day 2 ordered for 1pm  · PCT 0 08  · Pending AM reflex  · U/A remarkable for 1+ ketone, negative for leukocytes/nitrites  · Toxicology consulted; appreciate recs  · Octreotide 50 mcg given empirically, should cover for sulfonylurea      Hypoglycemia  Assessment & Plan  Lab Results   Component Value Date    POCGLU 145 (H) 03/08/2022    POCGLU 56 (L) 03/08/2022    POCGLU 163 (H) 03/08/2022    POCGLU 53 (L) 03/08/2022    POCGLU 211 (H) 03/08/2022    POCGLU 22 (LL) 03/08/2022    POCGLU 354 (H) 03/08/2022    POCGLU 295 (H) 03/08/2022    POCGLU 30 (LL) 03/08/2022    POCGLU 169 (H) 03/08/2022     Unclear reason as to why patient continues to become hypoglycemic after dextrose boluses  Ongoing investigation  Plan:  See above for Encephalopathy     MG (myasthenia gravis) ([facility] [address] )  Assessment & Plan  Diagnosed over 6 years ago, follows with [facility] neurology and Hematology  Last IVIG infusion documented December 2021, patient is supposed to receive treatments every 4-6 hours per chart review  [facility] an [facility] on March 22, 2022 for IVIG infusion  Plan  · Neurology consulted; appreciate recs  · Started IVIG, first dose 3/8/22  · Monitor O2  · Pending Ach-R and MUSK antibodies  · Normal cortisol     Elevated TSH  Assessment & Plan  TSH 4 983    T4: 1 21     Sepsis ([address] )  Assessment & Plan  Concerns for sepsis  Hypothermic, hypotensive, encephalopathic, lactic acidosis 2 3    Plan:  · Continue empiric vancomycin and ceftriaxone D2  · F/u blood cultures, narrow abx as able  · Monitor CBC daily, PCT in AM    COPD (chronic obstructive pulmonary disease) ([address] )  Assessment & Plan  Takes duonebs at home    Plan  · Xopenex and Atrovent TID     Hypertension  Assessment & Plan  Hypotensive POA    Plan:  · Hold home Norvasc until no longer hypotensive     Chronic anemia  Assessment & Plan  Lab Results   Component Value Date    HGB 10 3 (L) 03/08/2022   Per CareEverywhere chart review, patient Hgb baseline 10    Plan:  · Monitor CBC, transfuse Hgb <7       SYED (obstructive sleep apnea)  Assessment & Plan  · CPAP qhs as tolerated     Stage 3 chronic kidney disease ([facility] [address] )  Assessment & Plan  Lab Results   Component Value Date    CREATININE 1 66 (H) 03/08/2022     Per chart review, baseline Cr 1 5-1 6  Plan:  · Monitor Cr daily  · Monitor I/O    Pancreatic lesion  Assessment & Plan  Per chart review 12/2021 - "While inpatient at [facility] he had a CT scan abdomen was concerning for pancreatic mass  MRI noted for 5mm cystic lesion not changed from prior   Continue conservative management at this time "  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     Plan  · 3/8/22 CT c/a/p: PANCREAS:  Unremarkable  · Likely resolved cyst     Malignant neoplasm of prostate [facility])  Assessment & Plan  Prostate cancer s/p seed implants in 2006  Plan  · Holding home terazosin    Benign prostatic hyperplasia  Assessment & Plan  PMHx of prostate cancer 2006 s/p seed implants    Plan  · Holding home terazosin    ----------------------------------------------------------------------------------------  HPI/24hr events: Continued on D10 infusion overnight for hypoglycemia  Pt with toxic metabolic encephalopathy, aggressive towards staff and spitting at staff  Given haldol IV overnight: 2 mg and 5 mg  Qtc 412  Patient appropriate for transfer out of the ICU today?: No  Disposition: Continue Stepdown Level 1 level of care   Code Status: Level 1 - Full Code  ---------------------------------------------------------------------------------------  SUBJECTIVE  Not talking    Review of Systems   Unable to perform ROS: Mental status change     ---------------------------------------------------------------------------------------  OBJECTIVE    Vitals   Vitals:    22 1745 22 1900 22 2314   BP: 100/53 115/57 126/62 111/58   BP Location: Right arm   Left arm   Pulse: 72 75 78 68   Resp: 16 16 (!) 25 21   Temp: (!) 96 1 °F (35 6 °C)  97 6 °F (36 4 °C)    TempSrc:   Axillary    SpO2: 97% 97% 96% 97%   Weight:       Height:         Temp (24hrs), Av 2 °F (34 6 °C), Min:92 1 °F (33 4 °C), Max:97 6 °F (36 4 °C)  Current: Temperature:  (unable to obtain due to pt thrashing and spitting)        Respiratory:  SpO2: SpO2: 97 %     Physical Exam  Vitals and nursing note reviewed  Constitutional:       General: He is not in acute distress  Appearance: He is ill-appearing  He is not diaphoretic  HENT:      Head: Normocephalic and atraumatic        Nose: Nose normal       Mouth/Throat:      Mouth: Mucous membranes are moist       Pharynx: Oropharynx is clear  Eyes:      Conjunctiva/sclera: Conjunctivae normal       Pupils: Pupils are equal, round, and reactive to light  Cardiovascular:      Rate and Rhythm: Normal rate and regular rhythm  Pulses: Normal pulses  Heart sounds: Normal heart sounds  Pulmonary:      Effort: Pulmonary effort is normal       Comments: Lungs decreased diffusely B/L   On RA and SpO2 97%  Abdominal:      General: Bowel sounds are normal  There is no distension  Palpations: Abdomen is soft  Tenderness: There is no abdominal tenderness  Musculoskeletal:         General: Normal range of motion  Cervical back: Normal range of motion and neck supple  Skin:     General: Skin is warm and dry  Neurological:      General: No focal deficit present  Mental Status: He is alert  Comments: Not following commands or verbally responding to questions   Moves all extremities equally to physical stimuli          Laboratory and Diagnostics:  Results from last 7 days   Lab Units 03/08/22  2212 03/08/22  1310   WBC Thousand/uL  --  9 79   HEMOGLOBIN g/dL  --  10 3*   HEMATOCRIT %  --  28 6*   PLATELETS Thousands/uL 175 165   NEUTROS PCT %  --  83*   MONOS PCT %  --  7     Results from last 7 days   Lab Units 03/08/22  1310   SODIUM mmol/L 148*   POTASSIUM mmol/L 3 4*   CHLORIDE mmol/L 115*   CO2 mmol/L 23   ANION GAP mmol/L 10   BUN mg/dL 51*   CREATININE mg/dL 1 66*   CALCIUM mg/dL 9 2   GLUCOSE RANDOM mg/dL 37*   ALT U/L 65   AST U/L 55*   ALK PHOS U/L 116   ALBUMIN g/dL 2 6*   TOTAL BILIRUBIN mg/dL 0 31          Results from last 7 days   Lab Units 03/08/22  1311   INR  1 14   PTT seconds 55*          Results from last 7 days   Lab Units 03/08/22  1520 03/08/22  1310   LACTIC ACID mmol/L 2 0 2 3*     ABG:    VBG:    Results from last 7 days   Lab Units 03/08/22  1311   PROCALCITONIN ng/ml 0 08       Micro  Results from last 7 days   Lab Units 03/08/22  1316   BLOOD CULTURE  Received in Microbiology Lab  Culture in Progress  Received in Microbiology Lab  Culture in Progress  Imaging: I have personally reviewed pertinent reports  and I have personally reviewed pertinent films in PACS    Intake and Output  I/O       03/07 0701 03/08 0700 03/08 0701 03/09 0700    IV Piggyback  1000    Total Intake(mL/kg)  1000 (13 7)    Urine (mL/kg/hr)  100    Total Output  100    Net  +900                Height and Weights   Height: 5' 5" (165 1 cm)  IBW (Ideal Body Weight): 61 5 kg  Body mass index is 26 78 kg/m²  Weight (last 2 days)     Date/Time Weight    03/08/22 1220 73 (160 94)            Nutrition       Diet Orders   (From admission, onward)             Start     Ordered    03/08/22 2039  Diet NPO; Sips with meds  Diet effective now        References:    Nutrtion Support Algorithm Enteral vs  Parenteral   Question Answer Comment   Diet Type NPO    NPO Except: Sips with meds    RD to adjust diet per protocol?  Yes        03/08/22 2038 03/08/22 1830  Room Service  Once        Question:  Type of Service  Answer:  Room Service - Appropriate with Assistance    03/08/22 1829                  Active Medications  Scheduled Meds:  Current Facility-Administered Medications   Medication Dose Route Frequency Provider Last Rate    cefTRIAXone  2,000 mg Intravenous Once Beata Sotelo MD      dextrose  100 mL/hr Intravenous Continuous Beata Sotelo  mL/hr (03/08/22 2241)    heparin (porcine)  5,000 Units Subcutaneous [facility] Beata Sotelo MD      immune globulin, human  333 mg/kg (Ideal) Intravenous Q24H Beata Sotelo MD      ipratropium  0 5 mg Nebulization TID Keyana Dacosta DO      levalbuterol  1 25 mg Nebulization TID Beata Sotelo MD      pantoprazole  40 mg Intravenous Q12H [address] Beata Sotelo MD      potassium chloride  20 mEq Intravenous Q2H Beata Sotelo MD 20 mEq (03/09/22 0019)    sodium chloride (PF)  3 mL Intravenous Q1H PRN Beata Sotelo MD  vancomycin  15 mg/kg Intravenous Once Charletta Schilder, MD       Continuous Infusions:  dextrose, 100 mL/hr, Last Rate: 100 mL/hr (03/08/22 8501)      PRN Meds:   sodium chloride (PF), 3 mL, Q1H PRN        Invasive Devices Review  Invasive Devices  Report    Peripheral Intravenous Line            Peripheral IV 03/08/22 Distal;Right;Upper;Ventral (anterior) Arm <1 day    Peripheral IV 03/08/22 Left Forearm <1 day    Peripheral IV 03/08/22 Right Forearm <1 day          Drain            Urethral Catheter Temperature probe 16 Fr  <1 day                ---------------------------------------------------------------------------------------  Advance Directive and Living Will:      Power of :    POLST:    ---------------------------------------------------------------------------------------  Care Time Delivered:   No Critical Care time spent       JOSE Wilkins      Portions of the record may have been created with voice recognition software  Occasional wrong word or "sound a like" substitutions may have occurred due to the inherent limitations of voice recognition software    Read the chart carefully and recognize, using context, where substitutions have occurred

## 2022-03-09 NOTE — ASSESSMENT & PLAN NOTE
Concerns for sepsis   Hypothermic, hypotensive, encephalopathic, lactic acidosis 2 3    Plan:  · Continue empiric vancomycin and ceftriaxone D2  · F/u blood cultures, narrow abx as able  · Monitor CBC daily, PCT in AM

## 2022-03-09 NOTE — PROGRESS NOTES
Progress Note - Neurology   Travis Puga 81 y o  male [id number]  Unit/Bed#: S /S -01    Assessment/Plan:    * Encephalopathy  Assessment & Plan  81 y o  male with myasthenia gravis (primarily occular), hearing deficit, HTN, baseline dysphagia, COPD, sleep apnea, CKD stage 3, and prostate cancer who presented to [facility] ED on 3/8/22 from [place] [address] with AMS in the setting of hypoglycemia, hypotension, hypothermia and multiple metabolic disturbances detailed below  Labs on presentation:  - Initial fingerstick glucose of 169,  BMP approximately 1 hour later revealed hypoglycemia at 37  - Creatinine elevated 1 66  - Hypernatremic, 148  - Hypokalemic, 3 4  - TSH elevated 4 983, Free T4 1 21  - Lactic acidosis, 2 3  - UA:  Negative nitrite, 0-1 WBC  - Results WNL: Procalcitonin, troponin, COVID/flu/RSV, RDU  - CTH negative for acute abnormality  Medical Toxicology has been consulted  They report possible differential diagnosis for hypoglycemia to be sepsis vs patient having been given incorrect medications (ie  Sulfonylureas) at SNF  POC BS this morning 72     3/9 RN note reports that patient was " alert, combative, non verbal throughout the night" pulling at lines and his matt, thus requiring soft wrist restraints  Due to trying to spit on staff he has a face shield on  Patient has been unable to be calmed and redirected  Per RN Haldol x2 given with no effect  Fentanyl given following which patient was reported to be calm for 45 mins and then agitated again  Patient remains hypothermic though up to 97 and has had intermittent transient bradycardia to the 30s overnight  On my exam, patient is nonverbal though attends to examiner throughout interaction and tracks examiner  He does not follow commands and briskly pulls away extremities symmetricaaly when examiner attempting to move or applying light noxious stimuli   He is rigid; however, he is not febrile, not tachycardic and his neck is supple with no evidence of meningismus  Case reviewed with Dr Nava Patches  On serial exam with attending, patient's examine was similar  Continue to suspect Toxic Metabolic Encephalopathy (? Infection as procalcitonin has increased) superimposed on underlying Myasthenia Gravis  Meningitis less likely based on examine findings above  Cannot exclude component of seizure  Less likely central etiology but will continue serial exams and trend any improvement with empiric antibiotics  Recommendations as follows:    Plan:   - Continue IVIG at 1 g/kg daily x 3 days, lasting over 4 hours with 250 cc of IVF prior to dose  · First dose was given on 3/9 at 0028  - Pending:  Blood cultures x2,  Acetylcholine receptors (binding, blocking, modulating), Musk antibody  - Procalcitonin increased - Empiric Ceftriaxone and Vancomycin were initiated in the interim  - Will trend creatinine in the setting of JUDITH and IVIG administration  - Obtain vital capacity and NIF, monitor O2  - Obtain EEG (anticipate patient may resist initially when leads are placed but after that he will likely calm down for the study)  If patient continues to not show any improvement, will consider repeat CTH vs MRI  At this time; however, patient examine is non-lateralizing and patient would not be able to remain still for MRI  - Continue correction of toxic/metabolic/infectious etiologies   - Telemetry  - Medical management supportive care per primary team, notify with changes  - Patient follow-up with outpatient neuromuscular specialist on discharge    MG (myasthenia gravis) [facility])  Assessment & Plan  Patient was origionally diagnosed with MG in 2011  He initially was following with Neurology in [facility]   He was first evaluated by Dr Dulce Jackson on 2/16/22      - Trialed multiple medications in the past which failed due to side effects:  · Mestinon, developed GI symptoms (diarrhea)  · Prednisone, unknown side effect  · CellCept, developed SOB, lower extremity swelling, arthralgias  - Patient had been receiving IVIG every 4-6 weeks, last treatment November 2021  - Trialed again on Mestinon 30 mg TID mid to late February 2022, however patient developed symptoms of vision dysfunction, slurred speech, and difficulty elevating bilateral upper extremities; Mestinon was discontinued at that point  - Outpatient acetylcholine receptor antibodies and CT chest ordered as part of MG workup, workup not completed in outpatient setting  - Scheduled to receive IVIG outpatient on 03/22, 03/23, and 03/24, instead will be receiving IVIG inpatient this admission over 3 days   - IVIG had been delayed due to insurance and scheduling issues  - CT chest abdomen pelvis this admission unremarkable for thymoma  - Acetylcholine receptor/MUSK antibodies pending    Followup has yet to be determined  Subjective:   Patient combative overnight requiring wrist restraints, Haldol x 2 and Fentanyl x 1  He remains hypothermic, though much improved  Today 97 2 (up from 92 7 upon arrival    Hypotension has improved with /58 this morning  Na 142 (down from 148)  BUN/Cr 36/1 7  Procalcitonin at 0 8 (up from 0 06)  WBC 8 6  Blood cultures NGTD    On my exam this morning, patient has soft wrist restraints on and face shield  He continues to be entirely nonvocal and does not follow commands  Past Medical History:   Diagnosis Date    Cancer [facility])     Kidney disease     SYED (obstructive sleep apnea)     Prostate cancer ([facility] [address] )     Rotator cuff rupture 10/20/2006     History reviewed  No pertinent surgical history    Family History   Problem Relation Age of Onset    Diabetes Mother     Cancer Father      Social History     Socioeconomic History    Marital status: Unknown     Spouse name: None    Number of children: None    Years of education: None    Highest education level: None   Occupational History    None   Tobacco Use    Smoking status: Former Smoker     Types: Pipe    Smokeless tobacco: Former User   Substance and Sexual Activity    Alcohol use: Never    Drug use: Never    Sexual activity: None   Other Topics Concern    None   Social History Narrative    None     Social Determinants of Health     Financial Resource Strain: Not on file   Food Insecurity: Not on file   Transportation Needs: Not on file   Physical Activity: Not on file   Stress: Not on file   Social Connections: Not on file   Intimate Partner Violence: Not on file   Housing Stability: Not on file       Medications: Reviewed in detail by me  ROS: Review of Systems   Unable to perform ROS: Patient nonverbal        Vitals: BP (!) 81/57   Pulse 92   Temp (!) 97 3 °F (36 3 °C)   Resp 21   Ht 5' 5" (1 651 m)   Wt 73 kg (160 lb 15 oz)   SpO2 96%   BMI 26 78 kg/m²     Physical Exam:   Physical Exam  Constitutional:       General: He is not in acute distress  Appearance: He is well-developed  He is ill-appearing  He is not toxic-appearing  Comments: Elderly male, supine in bed with soft wrist restraints in place bilaterally, and face shield (as patient spitting on staff overnight)   HENT:      Head: Normocephalic and atraumatic  Eyes:      General: No scleral icterus  Right eye: No discharge  Left eye: No discharge  Conjunctiva/sclera: Conjunctivae normal    Neck:      Comments: No evidence of meningismus  Cardiovascular:      Rate and Rhythm: Normal rate and regular rhythm  Pulmonary:      Effort: Pulmonary effort is normal  No respiratory distress  Breath sounds: No stridor  Musculoskeletal:      Cervical back: Normal range of motion and neck supple  Skin:     General: Skin is warm and dry  Findings: No erythema or rash  Neurological:      Mental Status: He is alert  Neurologic Exam     Mental Status   Alert  Entirely nonverbal  No attempts at a response when given 2 choices  Visually tracks examiner   Maintains attention looking at examiner throughout exam  Resists examiner PROM of extremities  Cranial Nerves   BTT present and symmetric bilaterally  Conjugate gaze  Face is grossly symmetric  Motor Exam   Muscle bulk: normal  Overall muscle tone: increased  Minimal spontaneous AROM of extremities, though patient forcefully resists/pulls away when examiner attempts to move distal extremities or applies light noxious stimuli     Sensory Exam   Responds symmetrically in all extremities when light noxious stimuli applied       Labs: Reviewed in detail by me  Imaging: I have personally reviewed pertinent imaging and PACS reports  VTE Prophylaxis: Heparin    Total time spent today 28 minutes  Greater than 50% of total time was spent with the patient and / or family counseling and / or coordination of care   A description of the counseling / coordination of care: reviewing chart, examining patient, speaking with patient's outpatient neurologist and primary team

## 2022-03-09 NOTE — PROGRESS NOTES
Pt alert, combative, non verbal throughout the night  Attempts constantly to  [facility] JAZ NEV[place] on staff,  Has a face shield on  Multiple attempts to calm, reduce stimuli, reorient pt were unsuccessful  Received a K+ rayna of 20 meq, K+3 4 at 1300 yest  Also received IVIG last night, tolerated well  Received pt On D10 at 75cc/hr,  continued to decrease, rate increased rate to 100cc/hr  BG mostly below 100 overnight, but over 70, remained NPO  Pt remained  In wrist restraints throughout the night, trying to pull at lines, matt etc  Received a skin tear on his left hand,while trying to pull his hand out of the restraints  Able to turn and prop pt once with the help of two other staff  Pt strong and very combative, no reassurance seems to help decrease this behavior  Haldol given twice overnight with no effect, fentanyl given this am, pt relaxed for about 45 mins and became agitated again  EKG done overnight, pt at times is bradycardic, has dropped into the 30's but does not sustain, increases within seconds  Pulse ox WNL on RA  Matt cath patent for 700cc clear yellow  Skin intact   Pt had been hypothermic 96 4, was up to 97 this am

## 2022-03-09 NOTE — PLAN OF CARE
Problem: Potential for Falls  Goal: Patient will remain free of falls  Description: INTERVENTIONS:  - Educate patient/family on patient safety including physical limitations  - Instruct patient to call for assistance with activity   - Consult OT/PT to assist with strengthening/mobility   - Keep Call bell within reach  - Keep bed low and locked with side rails adjusted as appropriate  - Keep care items and personal belongings within reach  - Initiate and maintain comfort rounds  - Make Fall Risk Sign visible to staff  - Offer Toileting every 2 Hours, in advance of need  - Initiate/Maintain bed alarm  - Apply yellow socks and bracelet for high fall risk patients  - Consider moving patient to room near nurses station  Outcome: Progressing     Problem: INFECTION - ADULT  Goal: Absence or prevention of progression during hospitalization  Description: INTERVENTIONS:  - Assess and monitor for signs and symptoms of infection  - Monitor lab/diagnostic results  - Monitor all insertion sites, i e  indwelling lines, tubes, and drains  - Monitor endotracheal if appropriate and nasal secretions for changes in amount and color  - [place] appropriate cooling/warming therapies per order  - Administer medications as ordered  - Instruct and encourage patient and family to use good hand hygiene technique  - Identify and instruct in appropriate isolation precautions for identified infection/condition  Outcome: Progressing  Goal: Absence of fever/infection during neutropenic period  Description: INTERVENTIONS:  - Monitor WBC    Outcome: Progressing     Problem: SAFETY ADULT  Goal: Patient will remain free of falls  Description: INTERVENTIONS:  - Educate patient/family on patient safety including physical limitations  - Instruct patient to call for assistance with activity   - Consult OT/PT to assist with strengthening/mobility   - Keep Call bell within reach  - Keep bed low and locked with side rails adjusted as appropriate  - Keep care items and personal belongings within reach  - Initiate and maintain comfort rounds  - Make Fall Risk Sign visible to staff  - Offer Toileting every 2 Hours, in advance of need  - Initiate/Maintain bed alarm  - Obtain necessary fall risk management equipment:   - Apply yellow socks and bracelet for high fall risk patients  - Consider moving patient to room near nurses station  Outcome: Progressing

## 2022-03-10 NOTE — PROGRESS NOTES
Progress Note - Neurology   Pat Recio 81 y o  male MRN: [id number]  Unit/Bed#: S -01 Encounter: [id number]      Assessment/Plan   * Encephalopathy  Assessment & Plan  Pat Recio is a 81 y o  male with myasthenia gravis (primarily occular), hearing deficit, HTN, baseline dysphagia, COPD, sleep apnea, CKD stage 3, and prostate cancer who presented to [facility] ED on 3/8/22 from [place] [id number] with AMS in the setting of hypoglycemia, hypotension, hypothermia and multiple metabolic disturbances detailed below  Labs on presentation:  - Initial fingerstick glucose of 169,  BMP approximately 1 hour later revealed hypoglycemia at 37  - Creatinine elevated 1 66  - Hypernatremic, 148  - Hypokalemic, 3 4  - TSH elevated 4 983, Free T4 1 21  - Lactic acidosis, 2 3  - UA:  Negative nitrite, 0-1 WBC  - Results WNL: Procalcitonin, troponin, COVID/flu/RSV, RDU, Blood cultures x 2  - CTH negative for acute abnormality  Medical Toxicology consulted, reporting possible differential diagnosis for hypoglycemia to be sepsis vs patient having been given incorrect medications (ie  Sulfonylureas) at SNF  POC BS this morning 72  Patient remains nonverbal and agitated in bilateral upper extremity restraints  Patient continues to not follow commands, however main strong and symmetric throughout  A few medications have been utilized to aid in agitation including  Haldol (ineffective) and fentanyl (effective for a short period of time)  Today patient is tachypneic, with a low-grade fever, and hypotension this morning 03/10  Continue to suspect Toxic Metabolic Encephalopathy (? Infection as procalcitonin has increased)  Clinical picture inconsistent with Myasthenia Gravis exacerbation  Meningitis less likely based on examine findings above  Cannot exclude component of seizure  Less likely central etiology but will continue serial exams and trend any improvement with empiric antibiotics      Plan:   - Continue IVIG at 1 g/kg daily x 3 days, lasting over 4 hours with 250 cc of IVF prior to dose  · 1st dose given on 3/9 at 0028, 2nd dose on 03/10 at 0024  - Pending: Acetylcholine receptors (binding, blocking, modulating), Musk antibody, Sulfonylurea screen  - Procalcitonin increased - antibiotic management per ICU team  - Will trend creatinine in the setting of JUDITH and IVIG administration  - Obtain vital capacity and NIF if able, continue to monitor O2  - Difficulty obtaining routine EEG due to agitation and combativeness  - Patient unlikely to tolerate MRI brain  - Consider repeat CT head if patient remains encephalopathic without clear etiology  - Consider CXR  - Continue correction of toxic/metabolic/infectious etiologies   - Telemetry  - Medical management supportive care per primary team, notify with changes  - Patient follow-up with outpatient neuromuscular specialist on discharge    MG (myasthenia gravis) [facility])  Assessment & Plan  Patient was originally diagnosed with MG in 2011  He initially was following with Neurology in [facility]  He was first evaluated by Dr Ramon Crowder on 2/16/22      - Trialed multiple medications in the past which failed due to side effects:  · Mestinon, developed GI symptoms (diarrhea)  · Prednisone, unknown side effect  · CellCept, developed SOB, lower extremity swelling, arthralgias  - Patient had been receiving IVIG every 4-6 weeks, last treatment November 2021  - Trialed again on Mestinon 30 mg TID mid to late February 2022, however patient developed symptoms of vision dysfunction, slurred speech, and difficulty elevating bilateral upper extremities;  Mestinon was discontinued at that point  - Outpatient acetylcholine receptor antibodies and CT chest ordered as part of MG workup, workup not completed in outpatient setting  - Scheduled to receive IVIG outpatient on 03/22, 03/23, and 03/24, instead will be receiving IVIG inpatient this admission over 3 days   - IVIG had been delayed due to insurance and scheduling issues  - CT chest abdomen pelvis this admission unremarkable for thymoma  - Acetylcholine receptor/MUSK antibodies pending    Kathy Jc will need follow up in at the next regular appointment with neuromuscular attending or advance practitioner  He will not require outpatient neurological testing  Patient appears to have a follow-up appointment with JOSE Stallworth on  03/29/2022  Plan to keep appointment    Subjective: Today patient remains encephalopathic, now tachypneic with a low-grade fever and hypotensive this morning with a reported BP of 63/47  Patient continues to move all extremities symmetrically without evidence of ocular weakness  ROS:  12 point ROS  Limited to encephalopathy    Vitals: Blood pressure 98/54, pulse 96, temperature 100 °F (37 8 °C), resp  rate (!) 31, height 5' 5" (1 651 m), weight 75 8 kg (167 lb 1 7 oz), SpO2 93 %  ,Body mass index is 27 81 kg/m²  Physical Exam  Vitals and nursing note reviewed  Constitutional:       General: He is in acute distress  Appearance: He is normal weight  He is ill-appearing  He is not diaphoretic  Comments: In bilateral upper extremity restraints   HENT:      Head: Normocephalic and atraumatic  Eyes:      General: No scleral icterus  Right eye: No discharge  Left eye: No discharge  Conjunctiva/sclera: Conjunctivae normal    Musculoskeletal:      Cervical back: Normal range of motion and neck supple  Comments: Swelling noted in distal right upper extremity   Skin:     General: Skin is warm and dry  Neurologic Exam     Mental Status    Patient is lying in bed, in bilateral upper extremity restraints  Opens eyes to repetitive verbal and tactile stimuli  Able to look at examiner, however does not maintain eye contact  Nonverbal throughout entire encounter    Does not follow any commands     Cranial Nerves   Pupils equal, round, 2 5 mm bilaterally, minimal reactivity  No evidence of gaze preference or forced gaze deviation  Conjugate gaze noted  Able to look in both left and right direction  Blink to threat intact bilaterally  Possible left ptosis, however this may be secondary to positioning  No other facial asymmetry noted     Motor Exam   Able to give 5/5  bilaterally when examiner fingers are placed in patient's hand  Patient does not released  upon pulling away  Limited strength testing in bilateral upper extremities due to restraints, however patient appears to be moving uppers symmetrically and pulling at the restraints  Withdrawals to tactile stimuli in bilateral lower extremities, brisk withdrawal and symmetric     Sensory Exam   Sensory exam limited to encephalopathy     Gait, Coordination, and Reflexes     Tremor   Resting tremor: absent  No rhythmic seizure-like activity movement noted throughout exam     Lab Results: I have personally reviewed pertinent reports    Recent Results (from the past 24 hour(s))   Fingerstick Glucose (POCT)    Collection Time: 03/09/22  3:18 PM   Result Value Ref Range    POC Glucose 108 65 - 140 mg/dl   Fingerstick Glucose (POCT)    Collection Time: 03/09/22  4:01 PM   Result Value Ref Range    POC Glucose 166 (H) 65 - 140 mg/dl   Fingerstick Glucose (POCT)    Collection Time: 03/09/22  5:22 PM   Result Value Ref Range    POC Glucose 104 65 - 140 mg/dl   Fingerstick Glucose (POCT)    Collection Time: 03/09/22  6:07 PM   Result Value Ref Range    POC Glucose 81 65 - 140 mg/dl   Fingerstick Glucose (POCT)    Collection Time: 03/09/22  7:31 PM   Result Value Ref Range    POC Glucose 98 65 - 140 mg/dl   Fingerstick Glucose (POCT)    Collection Time: 03/09/22  8:03 PM   Result Value Ref Range    POC Glucose 92 65 - 140 mg/dl   Fingerstick Glucose (POCT)    Collection Time: 03/09/22  9:04 PM   Result Value Ref Range    POC Glucose 147 (H) 65 - 140 mg/dl   Fingerstick Glucose (POCT)    Collection Time: 03/09/22 10:35 PM Result Value Ref Range    POC Glucose 81 65 - 140 mg/dl   Fingerstick Glucose (POCT)    Collection Time: 03/09/22 11:28 PM   Result Value Ref Range    POC Glucose 94 65 - 140 mg/dl   Fingerstick Glucose (POCT)    Collection Time: 03/10/22 12:06 AM   Result Value Ref Range    POC Glucose 112 65 - 140 mg/dl   Fingerstick Glucose (POCT)    Collection Time: 03/10/22  1:21 AM   Result Value Ref Range    POC Glucose 101 65 - 140 mg/dl   Fingerstick Glucose (POCT)    Collection Time: 03/10/22  2:13 AM   Result Value Ref Range    POC Glucose 134 65 - 140 mg/dl   Fingerstick Glucose (POCT)    Collection Time: 03/10/22  3:16 AM   Result Value Ref Range    POC Glucose 105 65 - 140 mg/dl   Fingerstick Glucose (POCT)    Collection Time: 03/10/22  4:00 AM   Result Value Ref Range    POC Glucose 110 65 - 140 mg/dl   Fingerstick Glucose (POCT)    Collection Time: 03/10/22  5:02 AM   Result Value Ref Range    POC Glucose 114 65 - 140 mg/dl   Fingerstick Glucose (POCT)    Collection Time: 03/10/22  6:02 AM   Result Value Ref Range    POC Glucose 100 65 - 140 mg/dl   Procalcitonin    Collection Time: 03/10/22  6:09 AM   Result Value Ref Range    Procalcitonin 1 06 (H) <=0 25 ng/ml   CBC    Collection Time: 03/10/22  6:09 AM   Result Value Ref Range    WBC 7 31 4 31 - 10 16 Thousand/uL    RBC 2 45 (L) 3 88 - 5 62 Million/uL    Hemoglobin 8 5 (L) 12 0 - 17 0 g/dL    Hematocrit 23 2 (L) 36 5 - 49 3 %    MCV 95 82 - 98 fL    MCH 34 7 (H) 26 8 - 34 3 pg    MCHC 36 6 31 4 - 37 4 g/dL    RDW 15 4 (H) 11 6 - 15 1 %    Platelets 966 (L) 080 - 390 Thousands/uL    MPV 11 7 8 9 - 12 7 fL   Basic metabolic panel    Collection Time: 03/10/22  6:09 AM   Result Value Ref Range    Sodium 134 (L) 136 - 145 mmol/L    Potassium 3 7 3 5 - 5 3 mmol/L    Chloride 106 100 - 108 mmol/L    CO2 17 (L) 21 - 32 mmol/L    ANION GAP 11 4 - 13 mmol/L    BUN 30 (H) 5 - 25 mg/dL    Creatinine 2 23 (H) 0 60 - 1 30 mg/dL    Glucose 88 65 - 140 mg/dL    Calcium 8 3 8 3 - 10 1 mg/dL    eGFR 25 ml/min/1 73sq m   Cortisol    Collection Time: 03/10/22  6:09 AM   Result Value Ref Range    Cortisol, Random 23 5 ug/dL   Fingerstick Glucose (POCT)    Collection Time: 03/10/22  7:38 AM   Result Value Ref Range    POC Glucose 98 65 - 140 mg/dl   Fingerstick Glucose (POCT)    Collection Time: 03/10/22  8:53 AM   Result Value Ref Range    POC Glucose 119 65 - 140 mg/dl   Fingerstick Glucose (POCT)    Collection Time: 03/10/22 10:00 AM   Result Value Ref Range    POC Glucose 118 65 - 140 mg/dl   Fingerstick Glucose (POCT)    Collection Time: 03/10/22 10:55 AM   Result Value Ref Range    POC Glucose 117 65 - 140 mg/dl   Fingerstick Glucose (POCT)    Collection Time: 03/10/22 11:58 AM   Result Value Ref Range    POC Glucose 121 65 - 140 mg/dl   Fingerstick Glucose (POCT)    Collection Time: 03/10/22  1:03 PM   Result Value Ref Range    POC Glucose 112 65 - 140 mg/dl   ]  Imaging Studies: I have personally reviewed pertinent reports and I have personally reviewed pertinent films in PACS  EKG, Pathology, and Other Studies: I have personally reviewed pertinent reports  VTE Prophylaxis: Heparin    Counseling / Coordination of Care  Total time spent today 25 minutes  Greater than 50% of total time was spent with the patient and/or family counseling and/or coordination of care  A description of the counseling/coordination of care:  Patient was seen and evaluated  Discussed with attending  Chart reviewed thoroughly including laboratory and imaging studies  Plan of care discussed with patient and primary team  Discussed etiologies with ICU team and plan moving forward  Dictation voice to text software has been used in the creation of this document  Please consider this in light of any contextual or grammatical errors

## 2022-03-10 NOTE — ASSESSMENT & PLAN NOTE
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

## 2022-03-10 NOTE — ASSESSMENT & PLAN NOTE
Diagnosed over 6 years ago, follows with [facility] neurology and Hematology  Last IVIG infusion documented December 2021, patient is supposed to receive treatments every 4-6 hours per chart review  Patient has an appointment with [facility] neurology on March 22, 2022 for IVIG infusion      Plan  · Neurology consulted; appreciate recs  · Started IVIG, first dose 3/8/22  · Monitor O2  · Pending Ach-R and MUSK antibodies -- will follow  · Normal cortisol

## 2022-03-10 NOTE — PROGRESS NOTES
Progress Note - Travis Puga 81 y o  male MRN: [id number]    Unit/Bed#: S -01 Encounter: [id number]      CC:  Hypoglycemia management    Subjective:   Travis Puga is a 81y o  year old male with myasthenia gravis, hypertension, CAD, COPD, prostate cancer, SYED the was admitted due to altered mental status and hypoglycemia  Patient nonverbal today, with closed eyes  Moaning and shaking  Startles on touches  Noisy breathing  Patient on soft limb restraints  Objective:    Vitals: Blood pressure 98/54, pulse 96, temperature 100 °F (37 8 °C), resp  rate (!) 31, height 5' 5" (1 651 m), weight 75 8 kg (167 lb 1 7 oz), SpO2 93 %  ,Body mass index is 27 81 kg/m²  Physical Exam:  General Appearance:  Not responding, with closed eyes, ill-appearing  Head: Normocephalic, without obvious abnormality, atraumatic  Extremities: moves all extremities  Skin: Skin pale and temperature normal    Pulm:  Noisy breathing, breath sounds decreased  Cardiovascular:  Difficult to appreciate due to noisy breathing  Lab, Imaging and other studies: I have personally reviewed pertinent reports  Assessment:  1  Hypoglycemia  Lab Results   Component Value Date    POCGLU 145 (H) 03/10/2022    POCGLU 112 03/10/2022    POCGLU 121 03/10/2022    POCGLU 117 03/10/2022     · Patient with altered mental status, patient gurgling on exam, opening eyes on pain stimuli  Tachypneic,  · Unclear the origin of hypoglycemia  Currently on D10 IV  Plan:  · Continue IV D 10  · Follow-up on sulfonylurea panel, and  insulin antibody results  2  Myasthenia gravis  Neurology on board  Currently on IVIG  3  Encephalopathy  · Patient with altered mental status  Unclear origin   Questionable if due to infection as procalcitonin increased,  IVIG on lobe lines:  Medication 0, metabolic disbalance, hypothermia  · Blood cultures -24 hours  · Toxicology consulted:  Recommended octreotide 50 mcg empirical is for possible sulfonylurea intoxication  · Management per primary care  4  COPD  5OSA      Portions of the record may have been created with voice recognition software  Occasional wrong word or "sound a like" substitutions may have occurred due to the inherent limitations of voice recognition software  Read the chart carefully and recognize, using context, where substitutions have occurred

## 2022-03-10 NOTE — ASSESSMENT & PLAN NOTE
Concerns for sepsis   Hypothermic, hypotensive, encephalopathic, lactic acidosis 2 3    Plan:  · Was started on empiric vancomycin and ceftriaxone   · F/u blood cultures, narrow abx as able  · Blood cultures negative x24 hours  · Monitor CBC daily, PCT in AM

## 2022-03-10 NOTE — ASSESSMENT & PLAN NOTE
41-year-old male presented to the ED via AMS from nursing home for hypoglycemia in the 30s that persisted after glucagon administration  Also having altered mental status  Patient was found hypothermic, 92 7  Pulse 30-60s  Blood pressures averaging in 90s/50s, satting >96% on RA  Patient is not on diabetes medications and neither has a history of diabetes  Blood sugars improved after D10 bolus into the 200-300s however continued blood sugar checks were hypoglycemic 22-53  POA unresponsive, was not speaking however improved and was Aaox4  DDx encephalopathy 2/2 metabolic derangements, hypothermia, infection, IVIG noncompliance for treatment of MG, medication error       Plan  · Continue SD 1  · Hypoglycemia  · Continue Q1H accucheck  · D10 @ 125 ml/hr  · Pending C-peptide  · Hypoglycemic protocol  · Monitor CMP  · Monitor temperature -- improved with improvement in blood glucose  · Cortisol Normal  · Blood cultures negative x24 hours  · Was started on ceftriaxone and vancomycin in ER  · PCT 0 08, 0 6  · U/A remarkable for 1+ ketone, negative for leukocytes/nitrites  · Toxicology consulted; appreciate recs  · Octreotide 50 mcg given empirically, should cover for sulfonylurea

## 2022-03-10 NOTE — ASSESSMENT & PLAN NOTE
Lab Results   Component Value Date    HGB 8 9 (L) 03/09/2022    HGB 10 3 (L) 03/08/2022   Per CareEverywhere chart review, patient Hgb baseline 10    Plan:  · Monitor CBC, transfuse Hgb <7

## 2022-03-10 NOTE — ASSESSMENT & PLAN NOTE
Lab Results   Component Value Date    CREATININE 1 70 (H) 03/09/2022    CREATININE 1 66 (H) 03/08/2022     Per chart review, baseline Cr 1 5-1 6  Plan:  · Monitor Cr daily    · Monitor I/O

## 2022-03-10 NOTE — ASSESSMENT & PLAN NOTE
Lab Results   Component Value Date    POCGLU 112 03/10/2022    POCGLU 94 03/09/2022    POCGLU 81 03/09/2022    POCGLU 147 (H) 03/09/2022    POCGLU 92 03/09/2022    POCGLU 98 03/09/2022    POCGLU 81 03/09/2022    POCGLU 104 03/09/2022    POCGLU 166 (H) 03/09/2022    POCGLU 108 03/09/2022    POCGLU 123 03/09/2022    POCGLU 89 03/09/2022    POCGLU 83 03/09/2022    POCGLU 100 03/09/2022     Unclear reason as to why patient continues to become hypoglycemic after dextrose boluses  Ongoing investigation      Plan:  See above for Encephalopathy

## 2022-03-10 NOTE — ASSESSMENT & PLAN NOTE
Hypotensive POA    Plan:  · Hold home Norvasc until no longer hypotensive   · BP normotensive, continue to monitor

## 2022-03-10 NOTE — PROGRESS NOTES
[facility]  Progress Note - Khari De Los Santos 1935, 81 y o  male MRN: [id number]  Unit/Bed#: S -Roc Encounter: [id number]  Primary Care Provider: Ariel Lynch MD   Date and time admitted to hospital: 3/8/2022 12:14 PM    * Encephalopathy  Assessment & Plan  81-year-old male presented to the ED via AMS from nursing home for hypoglycemia in the 30s that persisted after glucagon administration  Also having altered mental status  Patient was found hypothermic, 92 7  Pulse 30-60s  Blood pressures averaging in 90s/50s, satting >96% on RA  Patient is not on diabetes medications and neither has a history of diabetes  Blood sugars improved after D10 bolus into the 200-300s however continued blood sugar checks were hypoglycemic 22-53  POA unresponsive, was not speaking however improved and was Aaox4  DDx encephalopathy 2/2 metabolic derangements, hypothermia, infection, IVIG noncompliance for treatment of MG, medication error       Plan  · Continue SD 1  · Hypoglycemia  · Continue Q1H accucheck  · D10 @ 125 ml/hr  · Pending C-peptide  · Hypoglycemic protocol  · Monitor CMP  · Monitor temperature -- improved with improvement in blood glucose  · Cortisol Normal  · Blood cultures negative x24 hours  · Was started on ceftriaxone and vancomycin in ER  · PCT 0 08, 0 6  · U/A remarkable for 1+ ketone, negative for leukocytes/nitrites  · Toxicology consulted; appreciate recs  · Octreotide 50 mcg given empirically, should cover for sulfonylurea      Hypoglycemia  Assessment & Plan  Lab Results   Component Value Date    POCGLU 112 03/10/2022    POCGLU 94 03/09/2022    POCGLU 81 03/09/2022    POCGLU 147 (H) 03/09/2022    POCGLU 92 03/09/2022    POCGLU 98 03/09/2022    POCGLU 81 03/09/2022    POCGLU 104 03/09/2022    POCGLU 166 (H) 03/09/2022    POCGLU 108 03/09/2022    POCGLU 123 03/09/2022    POCGLU 89 03/09/2022    POCGLU 83 03/09/2022    POCGLU 100 03/09/2022     Unclear reason as to why patient continues to become hypoglycemic after dextrose boluses  Ongoing investigation  Plan:  See above for Encephalopathy     MG (myasthenia gravis) ([facility] [address] )  Assessment & Plan  Diagnosed over 6 years ago, follows with [facility] neurology and Hematology  Last IVIG infusion documented December 2021, patient is supposed to receive treatments every 4-6 hours per chart review  Patient has an appointment with [facility] neurology on March 22, 2022 for IVIG infusion  Plan  · Neurology consulted; appreciate recs  · Started IVIG, first dose 3/8/22  · Monitor O2  · Pending Ach-R and MUSK antibodies -- will follow  · Normal cortisol     Elevated TSH  Assessment & Plan  · TSH 4 983, T4: 1 21     Sepsis ([facility] [address] )  Assessment & Plan  Concerns for sepsis  Hypothermic, hypotensive, encephalopathic, lactic acidosis 2 3    Plan:  · Was started on empiric vancomycin and ceftriaxone   · F/u blood cultures, narrow abx as able  · Blood cultures negative x24 hours  · Monitor CBC daily, PCT in AM    COPD (chronic obstructive pulmonary disease) ([facility] [address] )  Assessment & Plan  Takes duonebs at home    Plan  · Xopenex and Atrovent TID    Hypertension  Assessment & Plan  Hypotensive POA    Plan:  · Hold home Norvasc until no longer hypotensive   · BP normotensive, continue to monitor    Chronic anemia  Assessment & Plan  Lab Results   Component Value Date    HGB 8 9 (L) 03/09/2022    HGB 10 3 (L) 03/08/2022   Per CareEverywhere chart review, patient Hgb baseline 10    Plan:  · Monitor CBC, transfuse Hgb <7  SYED (obstructive sleep apnea)  Assessment & Plan  · CPAP qhs as tolerated  Stage 3 chronic kidney disease [facility])  Assessment & Plan  Lab Results   Component Value Date    CREATININE 1 70 (H) 03/09/2022    CREATININE 1 66 (H) 03/08/2022     Per chart review, baseline Cr 1 5-1 6  Plan:  · Monitor Cr daily    · Monitor I/O    Pancreatic lesion  Assessment & Plan  Per chart review 12/2021 - "While inpatient at [facility] he had a CT scan abdomen was concerning for pancreatic mass  MRI noted for 5mm cystic lesion not changed from prior  Continue conservative management at this time "  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     Plan  · 3/8/22 CT c/a/p: PANCREAS:  Unremarkable  · Likely resolved cyst     Malignant neoplasm of prostate [facility])  Assessment & Plan  Prostate cancer s/p seed implants in 2006  Plan  · Holding home terazosin  Benign prostatic hyperplasia  Assessment & Plan  PMHx of prostate cancer 2006 s/p seed implants    Plan  · Holding home terazosin         ----------------------------------------------------------------------------------------  HPI/24hr events:   · Continues to be aggressive toward and spit at staff    ·     Patient appropriate for transfer out of the ICU today?: No  Disposition: Continue Stepdown Level 1 level of care   Code Status: Level 3 - DNAR and DNI  ---------------------------------------------------------------------------------------  SUBJECTIVE  Unable to elicit, patient non-verbal      Review of Systems   Unable to perform ROS: Patient nonverbal     Review of systems was unable to be performed secondary to non-verbal  ---------------------------------------------------------------------------------------  OBJECTIVE    Vitals   Vitals:    22 1900 22 2100 22 2300 03/10/22 0000   BP: 92/51 117/63 128/63 123/70   BP Location:   Left arm    Pulse: 75 90 96 96   Resp: (!) 24 (!) 25 (!) 24 (!) 24   Temp: 98 8 °F (37 1 °C) 99 1 °F (37 3 °C) 99 1 °F (37 3 °C) 99 3 °F (37 4 °C)   TempSrc:   Bladder    SpO2: 95% 94% 91% (!) 88%   Weight:       Height:         Temp (24hrs), Av 6 °F (36 4 °C), Min:96 6 °F (35 9 °C), Max:99 3 °F (37 4 °C)  Current: Temperature: 99 3 °F (37 4 °C)          Respiratory:  SpO2: SpO2: 94 %, SpO2 Device: O2 Device: Nasal cannula       Invasive/non-invasive ventilation settings   Respiratory  Report   Lab Data (Last 4 hours)    None         O2/Vent Data (Last 4 hours)    None                Physical Exam  Vitals and nursing note reviewed  Constitutional:       General: He is not in acute distress  Appearance: He is overweight  He is ill-appearing  He is not toxic-appearing  Interventions: He is restrained  Nasal cannula in place  HENT:      Head: Normocephalic and atraumatic  Right Ear: External ear normal       Left Ear: External ear normal       Nose: Nose normal  No congestion or rhinorrhea  Mouth/Throat:      Mouth: Mucous membranes are moist       Pharynx: Oropharyngeal exudate present  No posterior oropharyngeal erythema  Eyes:      General: No scleral icterus  Conjunctiva/sclera: Conjunctivae normal       Pupils: Pupils are equal, round, and reactive to light  Neck:      Vascular: No carotid bruit  Cardiovascular:      Rate and Rhythm: Normal rate  Rhythm irregular  Pulses:           Radial pulses are 2+ on the right side and 2+ on the left side  Dorsalis pedis pulses are 1+ on the right side and 1+ on the left side  Heart sounds: S1 normal and S2 normal  No murmur heard  No friction rub  No gallop  Pulmonary:      Effort: Tachypnea present  No accessory muscle usage or respiratory distress  Breath sounds: Transmitted upper airway sounds present  Examination of the left-lower field reveals decreased breath sounds  Decreased breath sounds and rhonchi present  No wheezing or rales  Comments: Patient expectorating thick white/yellow secretions  Appears barrel chested  Abdominal:      General: Abdomen is flat  There is no distension  Palpations: Abdomen is soft  Tenderness: There is no abdominal tenderness  Musculoskeletal:         General: No swelling or tenderness  Normal range of motion  Cervical back: Normal range of motion and neck supple  Right lower leg: No edema  Left lower leg: No edema  Lymphadenopathy:      Cervical: No cervical adenopathy     Skin:     General: Skin is warm and dry  Capillary Refill: Capillary refill takes less than 2 seconds  Coloration: Skin is pale  Findings: No bruising, erythema or rash  Neurological:      Mental Status: He is alert and easily aroused  GCS: GCS eye subscore is 4  GCS verbal subscore is 1  GCS motor subscore is 5  Cranial Nerves: No facial asymmetry  Sensory: No sensory deficit  Motor: No weakness  Psychiatric:         Behavior: Behavior is agitated and combative  Cognition and Memory: Cognition is impaired  Judgment: Judgment is impulsive  Laboratory and Diagnostics:  Results from last 7 days   Lab Units 22  0720 22  2212 22  1310   WBC Thousand/uL 8 61  --  9 79   HEMOGLOBIN g/dL 8 9*  --  10 3*   HEMATOCRIT % 25 0*  --  28 6*   PLATELETS Thousands/uL 149 175 165   NEUTROS PCT % 85*  --  83*   MONOS PCT % 7  --  7     Results from last 7 days   Lab Units 22  0603 22  1310   SODIUM mmol/L 142 148*   POTASSIUM mmol/L 3 9 3 4*   CHLORIDE mmol/L 112* 115*   CO2 mmol/L 20* 23   ANION GAP mmol/L 10 10   BUN mg/dL 36* 51*   CREATININE mg/dL 1 70* 1 66*   CALCIUM mg/dL 8 5 9 2   GLUCOSE RANDOM mg/dL 59* 37*   ALT U/L 49 65   AST U/L 42 55*   ALK PHOS U/L 95 116   ALBUMIN g/dL 2 0* 2 6*   TOTAL BILIRUBIN mg/dL 0 28 0 31          Results from last 7 days   Lab Units 22  1311   INR  1 14   PTT seconds 55*          Results from last 7 days   Lab Units 22  1520 22  1310   LACTIC ACID mmol/L 2 0 2 3*     ABG:    VBG:    Results from last 7 days   Lab Units 22  0603 22  1311   PROCALCITONIN ng/ml 0 60* 0 08       Micro  Results from last 7 days   Lab Units 22  1316   BLOOD CULTURE  No Growth at 24 hrs  No Growth at 24 hrs  EKst degree AV block with PVCs  Imaging: I have personally reviewed pertinent reports   , I have personally reviewed pertinent films in PACS and no new imaging      Intake and Output  I/O 03/08 0701  03/09 0700 03/09 0701  03/10 0700    I V  (mL/kg) 927 9 (12 7) 2050 8 (28 1)    IV Piggyback 1200     Total Intake(mL/kg) 2127 9 (29 1) 2050 8 (28 1)    Urine (mL/kg/hr) 800 750 (0 4)    Total Output 800 750    Net +1327 9 +1300 8                Height and Weights   Height: 5' 5" (165 1 cm)  IBW (Ideal Body Weight): 61 5 kg  Body mass index is 26 78 kg/m²  Weight (last 2 days)     Date/Time Weight    03/08/22 1220 73 (160 94)            Nutrition       Diet Orders   (From admission, onward)             Start     Ordered    03/08/22 2039  Diet NPO; Sips with meds  Diet effective now        References:    Nutrtion Support Algorithm Enteral vs  Parenteral   Question Answer Comment   Diet Type NPO    NPO Except: Sips with meds    RD to adjust diet per protocol?  Yes        03/08/22 2038 03/08/22 1830  Room Service  Once        Question:  Type of Service  Answer:  Room Service - Appropriate with Assistance    03/08/22 1829                  Active Medications  Scheduled Meds:  Current Facility-Administered Medications   Medication Dose Route Frequency Provider Last Rate    dextrose  125 mL/hr Intravenous Continuous Vernimagalis Dickerson PA-ROBERT 125 mL/hr (03/09/22 2237)    heparin (porcine)  5,000 Units Subcutaneous [facility] Chucky Fish MD      immune globulin, human  333 mg/kg (Ideal) Intravenous Q24H Chucky Fish MD      ipratropium  0 5 mg Nebulization TID Janice Maxwell DO      levalbuterol  1 25 mg Nebulization TID Chucky Fish MD      pantoprazole  40 mg Intravenous Q12H [address] Chucky Fish MD      sodium chloride (PF)  3 mL Intravenous Q1H PRN Chucky Fish MD       Continuous Infusions:  dextrose, 125 mL/hr, Last Rate: 125 mL/hr (03/09/22 2237)      PRN Meds:   sodium chloride (PF), 3 mL, Q1H PRN        Invasive Devices Review  Invasive Devices  Report    Peripheral Intravenous Line            Peripheral IV 03/08/22 Distal;Right;Upper;Ventral (anterior) Arm 1 day Peripheral IV 03/08/22 Left Forearm 1 day          Drain            Urethral Catheter Temperature probe 16 Fr  1 day                Rationale for remaining devices:   PIV: IV access/medications  ---------------------------------------------------------------------------------------  Advance Directive and Living Will:      Power of :    POLST:    ---------------------------------------------------------------------------------------  Care Time Delivered:   Upon my evaluation, this patient had a high probability of imminent or life-threatening deterioration due to encephalopathy, hypoglycemia, which required my direct attention, intervention, and personal management  I have personally provided 35 minutes (6827 to 0402) of critical care time, exclusive of procedures, teaching, family meetings, and any prior time recorded by providers other than myself  JOSE Bolanos      Portions of the record may have been created with voice recognition software  Occasional wrong word or "sound a like" substitutions may have occurred due to the inherent limitations of voice recognition software  Read the chart carefully and recognize, using context, where substitutions have occurred

## 2022-03-11 NOTE — ASSESSMENT & PLAN NOTE
Lab Results   Component Value Date    HGB 7 6 (L) 03/11/2022    HGB 8 5 (L) 03/10/2022    HGB 8 9 (L) 03/09/2022   Per Claudio chart review, patient Hgb baseline 10    Plan:  · Slowly trending down, no obvious blood loss  · Monitor CBC, transfuse Hgb <7

## 2022-03-11 NOTE — PROGRESS NOTES
Received pt at approx 2300, tachypnies, RR 32, SOB, using accessory  Muscles, gurgling with oral pharyngeal secretions, coughing frequently but unable to clear secretions, attempted oral suctioning x4 with some success, moderate amounts of thick yellow secretions, Pt combative with suctioning making it very difficult to clear secretions; however, secretions are not the surface, oral suctioning is not sufficient to clear secretions, maintaining 02 sats  On 3L via NC  Notified NP and RT  Cardiac rhythm paced with PVCs, intermittent tachycardia  Pt appears fatigued from the work of breathing, coughing and attempts to clear secretions  New order to decrease IVF rate to 100cc/hr, completed  Turned and propped pt, HOB 45' and greater  Wrist restraints in place to prevent pt from pulling at lines  Circulation adequate  Will continue to monitor

## 2022-03-11 NOTE — ASSESSMENT & PLAN NOTE
Lab Results   Component Value Date    POCGLU 159 (H) 03/11/2022    POCGLU 144 (H) 03/11/2022    POCGLU 145 (H) 03/11/2022    POCGLU 145 (H) 03/11/2022    POCGLU 165 (H) 03/10/2022    POCGLU 148 (H) 03/10/2022    POCGLU 161 (H) 03/10/2022    POCGLU 148 (H) 03/10/2022    POCGLU 141 (H) 03/10/2022    POCGLU 150 (H) 03/10/2022    POCGLU 154 (H) 03/10/2022    POCGLU 146 (H) 03/10/2022    POCGLU 136 03/10/2022    POCGLU 145 (H) 03/10/2022     Ongoing investigation, as to etiology of hypoglycemia      Plan:  · See above for Encephalopathy   · Endocrinology following

## 2022-03-11 NOTE — ASSESSMENT & PLAN NOTE
Lab Results   Component Value Date    CREATININE 2 18 (H) 03/11/2022    CREATININE 2 23 (H) 03/10/2022    CREATININE 1 70 (H) 03/09/2022     Per chart review, baseline Cr 1 5-1 6  Plan:  · Monitor Cr daily    · Monitor I/O  · Avoid hypotension, nephrotoxins  · Monitor and replete electrolytes as needed

## 2022-03-11 NOTE — ASSESSMENT & PLAN NOTE
23-year-old male presented to the ED via AMS from nursing home for hypoglycemia in the 30s that persisted after glucagon administration  Also having altered mental status  Patient was found hypothermic, 92 7  Pulse 30-60s  Blood pressures averaging in 90s/50s, satting >96% on RA  Patient is not on diabetes medications and neither has a history of diabetes  Blood sugars improved after D10 bolus into the 200-300s however continued blood sugar checks were hypoglycemic 22-53  POA unresponsive, was not speaking however improved and was Aaox4  DDx encephalopathy 2/2 metabolic derangements, hypothermia, infection, IVIG noncompliance for treatment of MG, medication error  Plan  · Continue SD 1  · No further hypoglycemia events    Glucose trending up  · Continue Q1H accucheck  · D10 NS decreased to 100 ml/hr  · Pending C-peptide  · Hypoglycemic protocol  · Monitor CMP   · No further hypothermia  · Cortisol Normal  · Blood cultures negative x48 hours  · Continue to monitor off antibiotics  · PCT 0 08, 0 6, 1 09  · U/A remarkable for 1+ ketone, negative for leukocytes/nitrites  · Toxicology consulted; appreciate recs  · Octreotide 50 mcg was given empirically, should cover for sulfonylurea

## 2022-03-11 NOTE — ASSESSMENT & PLAN NOTE
Concerns for sepsis  Hypothermic, hypotensive, encephalopathic, lactic acidosis 2 3    · No obvious source of infection    · Continue to monitor off antibiotics

## 2022-03-11 NOTE — ASSESSMENT & PLAN NOTE
· CPAP qhs as tolerated  · Not currently using due to difficulty clearing upper airway secretions requiring frequent suctioning

## 2022-03-11 NOTE — PROGRESS NOTES
Progress Note - Celi Ayala 81 y o  male MRN: [id number]    Unit/Bed#: S -01 Encounter: [id number]      CC: hypoglycemia    Subjective:   Celi Ayala is a 81y o  year old male with myasthenia gravis, hypertension, CAD, COPD, prostate cancer, SYED  Patient on assessment was lying, awake, attempting to speak but dysarthric speech, difficult to understand  When asked if his comfortable he was moving his upper extremities and appears mild agitated  Followed command by moving eyes following the finger  Objective:     Vitals: Blood pressure 164/74, pulse 97, temperature 98 2 °F (36 8 °C), temperature source Bladder, resp  rate (!) 28, height 5' 5" (1 651 m), weight 75 8 kg (167 lb 1 7 oz), SpO2 96 %  ,Body mass index is 27 81 kg/m²  Physical Exam:  General Appearance: awake, dysarthric speech, mildly agitated  Head: Normocephalic, without obvious abnormality, atraumatic  Extremities: moves all extremities  Skin:  Skin cold to touch  Hematomas on both upper extremities  Pulm:  Currently on 4 L nasal cannula, coarse breathing sounds  No rales rhonchi present  Cardiovascular: regular heart rhythm and rate  Lab, Imaging and other studies: I have personally reviewed pertinent reports  Assessment:  1  Hypoglycemia  Patient awake  Speech dysarthria, difficult to understand but with improvement of mental status  Lab Results   Component Value Date    POCGLU 145 (H) 03/11/2022    POCGLU 125 03/11/2022    POCGLU 136 03/11/2022    POCGLU 139 03/11/2022    POCGLU 125 03/11/2022     IV dextrose 10% was decreased to  70 ml/hour  Patient currently NPO  Blood glucose stable  No recurrent episodes of hypoglycemia  Plan:  Continue IV dextrose  70 ML/HOUR  2  Encephalopathy   · Patient's mental status with improvement today  · Unclear origin of altered mental status   Questionable if due to infection   IVIG noncompliance,  medication error, metabolic disbalance, hypothermia, hypoglycemia  · Blood cultures -48 hours no growth  · Toxicology consulted:  Recommended octreotide 50 mcg empirical is for possible sulfonylurea intoxication  · Management per primary care  3  Myasthenia gravis  Completed IVIG for 3 days  Will follow-up with outpatient on neuromuscular specialist on discharge  4  COPD  5 SYED  6  CKD stage 3     Portions of the record may have been created with voice recognition software  Occasional wrong word or "sound a like" substitutions may have occurred due to the inherent limitations of voice recognition software  Read the chart carefully and recognize, using context, where substitutions have occurred

## 2022-03-11 NOTE — ASSESSMENT & PLAN NOTE
Diagnosed over 6 years ago, follows with [facility] neurology and Hematology  Last IVIG infusion documented December 2021, patient is supposed to receive treatments every 4-6 hours per chart review  Patient has an appointment with [facility] \[facility]\"" neurology on March 22, 2022 for IVIG infusion      Plan  · Neurology consulted; appreciate recs  · Recevied 3rd dose IVIG overnight  · Monitor O2  · Pending Ach-R and MUSK antibodies -- will follow  · Normal cortisol

## 2022-03-11 NOTE — PROGRESS NOTES
[facility]  Progress Note - Tiney Backers 1935, 81 y o  male MRN: [id number]  Unit/Bed#: S -Roc Encounter: [id number]  Primary Care Provider: Crystal Hennessy MD   Date and time admitted to hospital: 3/8/2022 12:14 PM    * Encephalopathy  Assessment & Plan  26-year-old male presented to the ED via AMS from nursing home for hypoglycemia in the 30s that persisted after glucagon administration  Also having altered mental status  Patient was found hypothermic, 92 7  Pulse 30-60s  Blood pressures averaging in 90s/50s, satting >96% on RA  Patient is not on diabetes medications and neither has a history of diabetes  Blood sugars improved after D10 bolus into the 200-300s however continued blood sugar checks were hypoglycemic 22-53  POA unresponsive, was not speaking however improved and was Aaox4  DDx encephalopathy 2/2 metabolic derangements, hypothermia, infection, IVIG noncompliance for treatment of MG, medication error  Plan  · Continue SD 1  · No further hypoglycemia events    Glucose trending up  · Continue Q1H accucheck  · D10 NS decreased to 100 ml/hr  · Pending C-peptide  · Hypoglycemic protocol  · Monitor CMP   · No further hypothermia  · Cortisol Normal  · Blood cultures negative x48 hours  · Continue to monitor off antibiotics  · PCT 0 08, 0 6, 1 09  · U/A remarkable for 1+ ketone, negative for leukocytes/nitrites  · Toxicology consulted; appreciate recs  · Octreotide 50 mcg was given empirically, should cover for sulfonylurea      Hypoglycemia  Assessment & Plan  Lab Results   Component Value Date    POCGLU 159 (H) 03/11/2022    POCGLU 144 (H) 03/11/2022    POCGLU 145 (H) 03/11/2022    POCGLU 145 (H) 03/11/2022    POCGLU 165 (H) 03/10/2022    POCGLU 148 (H) 03/10/2022    POCGLU 161 (H) 03/10/2022    POCGLU 148 (H) 03/10/2022    POCGLU 141 (H) 03/10/2022    POCGLU 150 (H) 03/10/2022    POCGLU 154 (H) 03/10/2022    POCGLU 146 (H) 03/10/2022    POCGLU 136 03/10/2022 POCGLU 145 (H) 03/10/2022     Ongoing investigation, as to etiology of hypoglycemia  Plan:  · See above for Encephalopathy   · Endocrinology following    MG (myasthenia gravis) ([facility] )  Assessment & Plan  Diagnosed over 6 years ago, follows with [facility] neurology and Hematology  Last IVIG infusion documented December 2021, patient is supposed to receive treatments every 4-6 hours per chart review  Patient has an appointment with [facility] neurology on March 22, 2022 for IVIG infusion  Plan  · Neurology consulted; appreciate recs  · Recevied 3rd dose IVIG overnight  · Monitor O2  · Pending Ach-R and MUSK antibodies -- will follow  · Normal cortisol     Elevated TSH  Assessment & Plan  · TSH 4 983, T4 1 21     Sepsis ([facility] )  Assessment & Plan  Concerns for sepsis  Hypothermic, hypotensive, encephalopathic, lactic acidosis 2 3    · No obvious source of infection  · Continue to monitor off antibiotics    COPD (chronic obstructive pulmonary disease) ([facility] )  Assessment & Plan  Takes duonebs at home    Plan  · Xopenex and Atrovent TID  · Pulmonary toilet    Hypertension  Assessment & Plan  Hypotensive POA    Plan:  · Hold home Norvasc until no longer hypotensive   · BP normotensive, continue to monitor  Chronic anemia  Assessment & Plan  Lab Results   Component Value Date    HGB 7 6 (L) 03/11/2022    HGB 8 5 (L) 03/10/2022    HGB 8 9 (L) 03/09/2022   Per CareEverywhere chart review, patient Hgb baseline 10    Plan:  · Slowly trending down, no obvious blood loss  · Monitor CBC, transfuse Hgb <7         SYED (obstructive sleep apnea)  Assessment & Plan  · CPAP qhs as tolerated  · Not currently using due to difficulty clearing upper airway secretions requiring frequent suctioning    Stage 3 chronic kidney disease [facility])  Assessment & Plan  Lab Results   Component Value Date    CREATININE 2 18 (H) 03/11/2022    CREATININE 2 23 (H) 03/10/2022    CREATININE 1 70 (H) 03/09/2022     Per chart review, baseline Cr 1  5-1 6  Plan:  · Monitor Cr daily  · Monitor I/O  · Avoid hypotension, nephrotoxins  · Monitor and replete electrolytes as needed    Pancreatic lesion  Assessment & Plan  Per chart review 2021 - "While inpatient at [facility] he had a CT scan abdomen was concerning for pancreatic mass  MRI noted for 5mm cystic lesion not changed from prior  Continue conservative management at this time "  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     Plan  · 3/8/22 CT c/a/p: PANCREAS:  Unremarkable  · Likely resolved cyst    Malignant neoplasm of prostate [facility])  Assessment & Plan  Prostate cancer s/p seed implants in   Plan  · Holding home terazosin    Benign prostatic hyperplasia  Assessment & Plan  PMHx of prostate cancer 2006 s/p seed implants    Plan  · Holding home terazosin         ----------------------------------------------------------------------------------------  HPI/24hr events:   · Remains encephalopathic  · Fingerstick glucose improved       Patient appropriate for transfer out of the ICU today?: No  Disposition: Continue Stepdown Level 1 level of care   Code Status: Level 3 - DNAR and DNI  ---------------------------------------------------------------------------------------  SUBJECTIVE  Unable to elicit, encephalopathy    Review of Systems   Unable to perform ROS: Other (encephalopathy)     Review of systems was unable to be performed secondary to encephalopathy  ---------------------------------------------------------------------------------------  OBJECTIVE    Vitals   Vitals:    03/10/22 2300 22 0000 22 0100 22 0200   BP: 106/53 120/70 92/50 131/63   BP Location: Left arm      Pulse: 88 85 96 93   Resp: (!) 35 (!) 32 (!) 39 (!) 35   Temp: 98 1 °F (36 7 °C) 98 1 °F (36 7 °C) 98 2 °F (36 8 °C) 98 1 °F (36 7 °C)   TempSrc:       SpO2: 95%  (!) 81% 93%   Weight:       Height:         Temp (24hrs), Av 5 °F (37 5 °C), Min:98 1 °F (36 7 °C), Max:100 2 °F (37 9 °C)  Current: Temperature: 98 1 °F (36 7 °C)          Respiratory:  SpO2: SpO2: 93 %, SpO2 Device: O2 Device: Nasal cannula  Nasal Cannula O2 Flow Rate (L/min): 4 L/min    Invasive/non-invasive ventilation settings   Respiratory  Report   Lab Data (Last 4 hours)    None         O2/Vent Data (Last 4 hours)    None                Physical Exam  Vitals and nursing note reviewed  Constitutional:       General: He is not in acute distress  Appearance: He is ill-appearing  He is not toxic-appearing or diaphoretic  Interventions: He is restrained  Nasal cannula in place  HENT:      Head: Normocephalic and atraumatic  Right Ear: External ear normal       Left Ear: External ear normal       Nose: Nose normal  No congestion or rhinorrhea  Mouth/Throat:      Mouth: Mucous membranes are moist       Pharynx: Oropharyngeal exudate present  No posterior oropharyngeal erythema  Eyes:      General: No scleral icterus  Conjunctiva/sclera: Conjunctivae normal       Pupils: Pupils are equal, round, and reactive to light  Neck:      Vascular: No carotid bruit  Cardiovascular:      Rate and Rhythm: Normal rate and regular rhythm  Extrasystoles are present  Pulses:           Radial pulses are 2+ on the right side and 2+ on the left side  Popliteal pulses are 1+ on the right side  Dorsalis pedis pulses are 2+ on the left side  Heart sounds: S1 normal and S2 normal  Heart sounds not distant  No murmur heard  No friction rub  No gallop  Pulmonary:      Effort: Tachypnea present  No accessory muscle usage or respiratory distress  Breath sounds: Transmitted upper airway sounds present  No decreased breath sounds, wheezing, rhonchi or rales  Comments: Coughing and spitting suspected upper airway secretions  Abdominal:      General: Abdomen is flat  Bowel sounds are normal  There is no distension  Palpations: Abdomen is soft  Tenderness: There is no abdominal tenderness  Musculoskeletal:         General: No swelling or tenderness  Normal range of motion  Cervical back: Normal range of motion and neck supple  Right lower leg: No edema  Left lower leg: No edema  Lymphadenopathy:      Cervical: No cervical adenopathy  Skin:     General: Skin is warm and dry  Capillary Refill: Capillary refill takes less than 2 seconds  Coloration: Skin is pale  Findings: No bruising, erythema or rash  Neurological:      Mental Status: He is easily aroused  GCS: GCS eye subscore is 4  GCS verbal subscore is 1  GCS motor subscore is 5  Cranial Nerves: No facial asymmetry  Sensory: No sensory deficit  Motor: No weakness  Comments: Easily aroused  Patient grunts at times  Is still aggressive with staff at times  Psychiatric:         Cognition and Memory: Cognition is impaired  Comments: Difficult to assess due to encephalopathy                Laboratory and Diagnostics:  Results from last 7 days   Lab Units 03/11/22  0309 03/10/22  0609 03/09/22  0720 03/08/22  2212 03/08/22  1310   WBC Thousand/uL 5 57 7 31 8 61  --  9 79   HEMOGLOBIN g/dL 7 6* 8 5* 8 9*  --  10 3*   HEMATOCRIT % 20 3* 23 2* 25 0*  --  28 6*   PLATELETS Thousands/uL 140* 142* 149 175 165   NEUTROS PCT %  --   --  85*  --  83*   MONOS PCT %  --   --  7  --  7     Results from last 7 days   Lab Units 03/11/22  0309 03/10/22  0609 03/09/22  0603 03/08/22  1310   SODIUM mmol/L 138 134* 142 148*   POTASSIUM mmol/L 3 4* 3 7 3 9 3 4*   CHLORIDE mmol/L 107 106 112* 115*   CO2 mmol/L 19* 17* 20* 23   ANION GAP mmol/L 12 11 10 10   BUN mg/dL 30* 30* 36* 51*   CREATININE mg/dL 2 18* 2 23* 1 70* 1 66*   CALCIUM mg/dL 8 2* 8 3 8 5 9 2   GLUCOSE RANDOM mg/dL 112 88 59* 37*   ALT U/L  --   --  49 65   AST U/L  --   --  42 55*   ALK PHOS U/L  --   --  95 116   ALBUMIN g/dL  --   --  2 0* 2 6*   TOTAL BILIRUBIN mg/dL  --   --  0 28 0 31          Results from last 7 days   Lab Units 03/08/22  1311   INR  1 14   PTT seconds 55*          Results from last 7 days   Lab Units 03/08/22  1520 03/08/22  1310   LACTIC ACID mmol/L 2 0 2 3*     ABG:    VBG:    Results from last 7 days   Lab Units 03/11/22  0327 03/10/22  0609 03/09/22  0603 03/08/22  1311   PROCALCITONIN ng/ml 1 09* 1 06* 0 60* 0 08       Micro  Results from last 7 days   Lab Units 03/08/22  1316   BLOOD CULTURE  No Growth at 48 hrs  No Growth at 48 hrs  EKG: sinus rhythm with 1st AVB and PVCs  Imaging: I have personally reviewed pertinent reports   , I have personally reviewed pertinent films in PACS and no new imaging    Intake and Output  I/O       03/09 0701  03/10 0700 03/10 0701 03/11 0700    I V  (mL/kg) 2800 8 (37)     Total Intake(mL/kg) 2800 8 (37)     Urine (mL/kg/hr) 990 (0 5) 450 (0 2)    Total Output 990 450    Net +1810 8 -450                Height and Weights   Height: 5' 5" (165 1 cm)  IBW (Ideal Body Weight): 61 5 kg  Body mass index is 27 81 kg/m²  Weight (last 2 days)     Date/Time Weight    03/10/22 0240 75 8 (167 11)            Nutrition       Diet Orders   (From admission, onward)             Start     Ordered    03/08/22 2039  Diet NPO; Sips with meds  Diet effective now        References:    Nutrtion Support Algorithm Enteral vs  Parenteral   Question Answer Comment   Diet Type NPO    NPO Except: Sips with meds    RD to adjust diet per protocol?  Yes        03/08/22 2038 03/08/22 1830  Room Service  Once        Question:  Type of Service  Answer:  Room Service - Appropriate with Assistance    03/08/22 1829                  Active Medications  Scheduled Meds:  Current Facility-Administered Medications   Medication Dose Route Frequency Provider Last Rate    dextrose 10 % and sodium chloride 0 9 %  100 mL/hr Intravenous Continuous JOSE Santizo 100 mL/hr (03/11/22 0000)    heparin (porcine)  5,000 Units Subcutaneous [facility] Cherry Waggoner MD      ipratropium  0 5 mg Nebulization TID Lisa Gomez DO      levalbuterol  1 25 mg Nebulization TID Cookie Forrester MD      pantoprazole  40 mg Intravenous Q12H [address] Cookie Forrester MD      potassium chloride  20 mEq Intravenous Q2H Rosa Santiago, [address] 20 mEq (03/11/22 0427)    sodium chloride (PF)  3 mL Intravenous Q1H PRN Cookie Forrester MD       Continuous Infusions:  dextrose 10 % and sodium chloride 0 9 %, 100 mL/hr, Last Rate: 100 mL/hr (03/11/22 0000)      PRN Meds:   sodium chloride (PF), 3 mL, Q1H PRN        Invasive Devices Review  Invasive Devices  Report    Peripheral Intravenous Line            Peripheral IV 03/08/22 Distal;Right;Upper;Ventral (anterior) Arm 2 days    Peripheral IV 03/08/22 Left Forearm 2 days          Drain            Urethral Catheter Temperature probe 16 Fr  2 days                Rationale for remaining devices:   PIV: IV assess/medications  Santamaria: BPH / urinary retention  ---------------------------------------------------------------------------------------  Advance Directive and Living Will:      Power of :    POLST:    ---------------------------------------------------------------------------------------  Care Time Delivered:   No Critical Care time spent       JOSE Lauren      Portions of the record may have been created with voice recognition software  Occasional wrong word or "sound a like" substitutions may have occurred due to the inherent limitations of voice recognition software    Read the chart carefully and recognize, using context, where substitutions have occurred

## 2022-03-11 NOTE — PLAN OF CARE
Problem: Potential for Falls  Goal: Patient will remain free of falls  Description: INTERVENTIONS:  - Educate patient/family on patient safety including physical limitations  - Instruct patient to call for assistance with activity   - Consult OT/PT to assist with strengthening/mobility   - Keep Call bell within reach  - Keep bed low and locked with side rails adjusted as appropriate  - Keep care items and personal belongings within reach  - Initiate and maintain comfort rounds  - Make Fall Risk Sign visible to staff  - Offer Toileting every 2 Hours, in advance of need  - Initiate/Maintain bed/chair alarm  - Obtain necessary fall risk management equipment  - Apply yellow socks and bracelet for high fall risk patients  - Consider moving patient to room near nurses station  Outcome: Progressing     Problem: MOBILITY - ADULT  Goal: Maintain or return to baseline ADL function  Description: INTERVENTIONS:  -  Assess patient's ability to carry out ADLs; assess patient's baseline for ADL function and identify physical deficits which impact ability to perform ADLs (bathing, care of mouth/teeth, toileting, grooming, dressing, etc )  - Assess/evaluate cause of self-care deficits   - Assess range of motion  - Assess patient's mobility; develop plan if impaired  - Assess patient's need for assistive devices and provide as appropriate  - Encourage maximum independence but intervene and supervise when necessary  - Involve family in performance of ADLs  - Assess for home care needs following discharge   - Consider OT consult to assist with ADL evaluation and planning for discharge  - Provide patient education as appropriate  Outcome: Progressing  Goal: Maintains/Returns to pre admission functional level  Description: INTERVENTIONS:  - Perform BMAT or MOVE assessment daily    - Set and communicate daily mobility goal to care team and patient/family/caregiver     - Collaborate with rehabilitation services on mobility goals if consulted  - Out of bed for meals 3 times a day  - Out of bed for toileting  - Record patient progress and toleration of activity level   Outcome: Progressing     Problem: NEUROSENSORY - ADULT  Goal: Achieves stable or improved neurological status  Description: INTERVENTIONS  - Monitor and report changes in neurological status  - Monitor vital signs such as temperature, blood pressure, glucose, and any other labs ordered   - Initiate measures to prevent increased intracranial pressure  - Monitor for seizure activity and implement precautions if appropriate      Outcome: Progressing  Goal: Achieves maximal functionality and self care  Description: INTERVENTIONS  - Monitor swallowing and airway patency with patient fatigue and changes in neurological status  - Encourage and assist patient to increase activity and self care     - Encourage visually impaired, hearing impaired and aphasic patients to use assistive/communication devices  Outcome: Progressing     Problem: DISCHARGE PLANNING - CARE MANAGEMENT  Goal: Discharge to post-acute care or home with appropriate resources  Description: INTERVENTIONS:  - Conduct assessment to determine patient/family and health care team treatment goals, and need for post-acute services based on payer coverage, community resources, and patient preferences, and barriers to discharge  - Address psychosocial, clinical, and financial barriers to discharge as identified in assessment in conjunction with the patient/family and health care team  - Arrange appropriate level of post-acute services according to patients   needs and preference and payer coverage in collaboration with the physician and health care team  - Communicate with and update the patient/family, physician, and health care team regarding progress on the discharge plan  - Arrange appropriate transportation to post-acute venues  Outcome: Progressing     Problem: PAIN - ADULT  Goal: Verbalizes/displays adequate comfort level or baseline comfort level  Description: Interventions:  - Encourage patient to monitor pain and request assistance  - Assess pain using appropriate pain scale  - Administer analgesics based on type and severity of pain and evaluate response  - Implement non-pharmacological measures as appropriate and evaluate response  - Consider cultural and social influences on pain and pain management  - Notify physician/advanced practitioner if interventions unsuccessful or patient reports new pain  Outcome: Progressing     Problem: INFECTION - ADULT  Goal: Absence or prevention of progression during hospitalization  Description: INTERVENTIONS:  - Assess and monitor for signs and symptoms of infection  - Monitor lab/diagnostic results  - Monitor all insertion sites, i e  indwelling lines, tubes, and drains  - Monitor endotracheal if appropriate and nasal secretions for changes in amount and color  - Deport appropriate cooling/warming therapies per order  - Administer medications as ordered  - Instruct and encourage patient and family to use good hand hygiene technique  - Identify and instruct in appropriate isolation precautions for identified infection/condition  Outcome: Progressing  Goal: Absence of fever/infection during neutropenic period  Description: INTERVENTIONS:  - Monitor WBC    Outcome: Progressing     Problem: SAFETY ADULT  Goal: Patient will remain free of falls  Description: INTERVENTIONS:  - Educate patient/family on patient safety including physical limitations  - Instruct patient to call for assistance with activity   - Consult OT/PT to assist with strengthening/mobility   - Keep Call bell within reach  - Keep bed low and locked with side rails adjusted as appropriate  - Keep care items and personal belongings within reach  - Initiate and maintain comfort rounds  - Make Fall Risk Sign visible to staff  - Offer Toileting every 2 Hours, in advance of need  - Initiate/Maintain bed/chair alarm  - Obtain necessary fall risk management equipment  - Apply yellow socks and bracelet for high fall risk patients  - Consider moving patient to room near nurses station  Outcome: Progressing  Goal: Maintain or return to baseline ADL function  Description: INTERVENTIONS:  -  Assess patient's ability to carry out ADLs; assess patient's baseline for ADL function and identify physical deficits which impact ability to perform ADLs (bathing, care of mouth/teeth, toileting, grooming, dressing, etc )  - Assess/evaluate cause of self-care deficits   - Assess range of motion  - Assess patient's mobility; develop plan if impaired  - Assess patient's need for assistive devices and provide as appropriate  - Encourage maximum independence but intervene and supervise when necessary  - Involve family in performance of ADLs  - Assess for home care needs following discharge   - Consider OT consult to assist with ADL evaluation and planning for discharge  - Provide patient education as appropriate  Outcome: Progressing  Goal: Maintains/Returns to pre admission functional level  Description: INTERVENTIONS:  - Perform BMAT or MOVE assessment daily    - Set and communicate daily mobility goal to care team and patient/family/caregiver     - Collaborate with rehabilitation services on mobility goals if consulted  - Out of bed for meals 3 times a day  - Out of bed for toileting  - Record patient progress and toleration of activity level   Outcome: Progressing     Problem: DISCHARGE PLANNING  Goal: Discharge to home or other facility with appropriate resources  Description: INTERVENTIONS:  - Identify barriers to discharge w/patient and caregiver  - Arrange for needed discharge resources and transportation as appropriate  - Identify discharge learning needs (meds, wound care, etc )  - Arrange for interpretive services to assist at discharge as needed  - Refer to Case Management Department for coordinating discharge planning if the patient needs post-hospital services based on physician/advanced practitioner order or complex needs related to functional status, cognitive ability, or social support system  Outcome: Progressing     Problem: Knowledge Deficit  Goal: Patient/family/caregiver demonstrates understanding of disease process, treatment plan, medications, and discharge instructions  Description: Complete learning assessment and assess knowledge base    Interventions:  - Provide teaching at level of understanding  - Provide teaching via preferred learning methods  Outcome: Progressing     Problem: Prexisting or High Potential for Compromised Skin Integrity  Goal: Skin integrity is maintained or improved  Description: INTERVENTIONS:  - Identify patients at risk for skin breakdown  - Assess and monitor skin integrity  - Assess and monitor nutrition and hydration status  - Monitor labs   - Assess for incontinence   - Turn and reposition patient  - Assist with mobility/ambulation  - Relieve pressure over bony prominences  - Avoid friction and shearing  - Provide appropriate hygiene as needed including keeping skin clean and dry  - Evaluate need for skin moisturizer/barrier cream  - Collaborate with interdisciplinary team   - Patient/family teaching  - Consider wound care consult   Outcome: Progressing

## 2022-03-11 NOTE — ASSESSMENT & PLAN NOTE
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

## 2022-03-11 NOTE — PLAN OF CARE
Problem: Potential for Falls  Goal: Patient will remain free of falls  Description: INTERVENTIONS:  - Educate patient/family on patient safety including physical limitations  - Instruct patient to call for assistance with activity   - Consult OT/PT to assist with strengthening/mobility   - Keep Call bell within reach  - Keep bed low and locked with side rails adjusted as appropriate  - Keep care items and personal belongings within reach  - Initiate and maintain comfort rounds  - Make Fall Risk Sign visible to staff  - Offer Toileting every two Hours, in advance of need  - Initiate/Maintain bed alarm  - Obtain necessary fall risk management equipment:   - Apply yellow socks and bracelet for high fall risk patients  - Consider moving patient to room near nurses station  Outcome: Progressing     Problem: MOBILITY - ADULT  Goal: Maintains/Returns to pre admission functional level  Description: INTERVENTIONS:  - Perform BMAT or MOVE assessment daily    - Set and communicate daily mobility goal to care team and patient/family/caregiver  - Collaborate with rehabilitation services on mobility goals if consulted  - Perform Range of Motion four times a day  - Reposition patient every two hours    - Dangle patient four times a day  - Stand patient three times a day  - Ambulate patient three times a day  - Out of bed to chair three times a day   - Out of bed for meals three times a day  - Out of bed for toileting  - Record patient progress and toleration of activity level   Outcome: Progressing     Problem: NEUROSENSORY - ADULT  Goal: Achieves stable or improved neurological status  Description: INTERVENTIONS  - Monitor and report changes in neurological status  - Monitor vital signs such as temperature, blood pressure, glucose, and any other labs ordered   - Initiate measures to prevent increased intracranial pressure  - Monitor for seizure activity and implement precautions if appropriate      Outcome: Progressing

## 2022-03-11 NOTE — PROGRESS NOTES
Progress Note - Neurology   Roberto Melgoza 81 y o  male MRN: [id number]  Unit/Bed#: S -01 Encounter: [id number]      Assessment/Plan   * Encephalopathy  Assessment & Plan  Roberto Melgoza is a 81 y o  male with myasthenia gravis (primarily occular), hearing deficit, HTN, baseline dysphagia, COPD, sleep apnea, CKD stage 3, and prostate cancer who presented to [facility] ED on 3/8/22 from [place] [id number] with AMS in the setting of hypoglycemia, hypotension, hypothermia and multiple metabolic disturbances detailed below  Labs on presentation:  - Initial fingerstick glucose of 169,  BMP approximately 1 hour later revealed hypoglycemia at 37  - Creatinine elevated 1 66  - Hypernatremic, 148  - Hypokalemic, 3 4  - TSH elevated 4 983, Free T4 1 21  - Lactic acidosis, 2 3  - UA:  Negative nitrite, 0-1 WBC  - Results WNL: Procalcitonin, troponin, COVID/flu/RSV, RDU, Blood cultures x 2  - CTH negative for acute abnormality  Medical Toxicology consulted, reporting possible differential diagnosis for hypoglycemia to be sepsis vs patient having been given incorrect medications (ie  Sulfonylureas) at SNF  POC BS this morning 72  Patient remains encephalopathic and agitated, in bilateral upper extremity restraints  Patient continues to not follow commands, however remains nonfocal on exam, specifically without evidence of ocular weakness typically seen with MG  S/p  IVIG x 3 days  A few medications have been utilized to aid in agitation including Haldol (ineffective) and fentanyl (effective for a short period of time)  Continue to suspect Toxic Metabolic Encephalopathy secondary to suspected pulmonary infectious etiology  Clinical picture inconsistent with Myasthenia Gravis exacerbation  Meningitis less likely based on examine findings above  Cannot exclude component of seizure   Less likely central etiology as exam remains nonfocal     Plan:   - Completed IVIG at 1 g/kg daily x 3 days, lasting over 4 hours with 250 cc of IVF prior to dose  · 1st dose on 3/9 at 0028, 2nd dose on 03/10 at 0024, 3rd dose on 03/11 at 0050  - Pending: Acetylcholine receptors (binding, blocking, modulating), Musk antibody, Sulfonylurea screen  - Procalcitonin continues to trend upward - antibiotic management per ICU team  - Will trend creatinine in the setting of JUDITH and IVIG administration  - Obtain vital capacity and NIF if able, continue to monitor O2  - Difficulty obtaining routine EEG due to agitation and combativeness  - Patient unlikely to tolerate MRI brain  - Consider repeat CT head if patient remains encephalopathic without clear etiology   - Consider CXR  - Continue correction of toxic/metabolic/infectious etiologies   - Telemetry  - Medical management supportive care per primary team, notify with changes  - Patient follow-up with outpatient neuromuscular specialist on discharge  - Neurology to sign off at this time, please call with questions    MG (myasthenia gravis) [facility])  Assessment & Plan  Patient was originally diagnosed with MG in 2011  He initially was following with Neurology in [facility]  He was first evaluated by Dr Heather Price on 2/16/22      - Trialed multiple medications in the past which failed due to side effects:  · Mestinon, developed GI symptoms (diarrhea)  · Prednisone, unknown side effect  · CellCept, developed SOB, lower extremity swelling, arthralgias  - Patient had been receiving IVIG every 4-6 weeks, last treatment November 2021  - Trialed again on Mestinon 30 mg TID mid to late February 2022, however patient developed symptoms of vision dysfunction, slurred speech, and difficulty elevating bilateral upper extremities;  Mestinon was discontinued at that point  - Outpatient acetylcholine receptor antibodies and CT chest ordered as part of MG workup, workup not completed in outpatient setting  - Scheduled to receive IVIG outpatient on 03/22, 03/23, and 03/24, instead will be receiving IVIG inpatient this admission over 3 days   - IVIG had been delayed due to insurance and scheduling issues  - CT chest abdomen pelvis this admission unremarkable for thymoma  - Acetylcholine receptor/MUSK antibodies pending    Hughes Simpers will need follow up in at the next regular appointment with neuromuscular attending or advance practitioner  He will not require outpatient neurological testing  Patient appears to be scheduled with outpatient Neurology, Adilson Sinha on 03/29/2022  Plan to keep appointment    Subjective: Today patient remains encephalopathic and agitated in bilateral upper extremity restraints  Patient remains nonfocal on exam, unable to follow any commands  ROS:  12 point ROS  Limited to encephalopathy    Vitals: Blood pressure 164/74, pulse 97, temperature 98 2 °F (36 8 °C), temperature source Bladder, resp  rate (!) 28, height 5' 5" (1 651 m), weight 75 8 kg (167 lb 1 7 oz), SpO2 96 %  ,Body mass index is 27 81 kg/m²  Physical Exam  Vitals and nursing note reviewed  Constitutional:       Appearance: He is normal weight  He is ill-appearing  He is not diaphoretic  HENT:      Head: Normocephalic and atraumatic  Mouth/Throat:      Comments: Linear bruising noted on soft palate   Eyes:      Pupils: Pupils are equal, round, and reactive to light  Pulmonary:      Comments: Increased work of breathing   Coughing throughout exam  Skin:     General: Skin is warm and dry  Coloration: Skin is not jaundiced or pale  Findings: No bruising, erythema, lesion or rash  Neurological:      Mental Status: He is alert  Neurologic Exam     Mental Status   Patient is alert, lying in bed, in bilateral upper extremity restraints  Attempts to speak, significantly dysarthric due to mucus/phlegm  Does not follow any commands  Unable to read and follow the command displayed in front of him   Does look at and intermittently tracks examiner     Cranial Nerves     CN III, IV, VI   Pupils are equal, round, and reactive to light  Conjugate gaze: present    Able to look at examiner to the right and left  No gaze preference or forced gaze deviation   No ocular weakness or ptosis noted   Blink to threat intact bilaterally   No obvious facial asymmetry noted on exam   No tongue lacerations noted      Motor Exam   BUE strength testing limited to BUE restraints    symmetric, 5/5 bilaterally     Difficulty maintaining BLE off the bed when placed in antigravity position by examiner, BLE quickly drift down to the bed  Withdrawals briskly to noxious stimuli in BLE     Sensory Exam   Sensory exam limited to encephalopathy      Gait, Coordination, and Reflexes     Tremor   Resting tremor: absent  No involuntary movements or seizure like activity noted throughout exam      Lab Results: I have personally reviewed pertinent reports    Recent Results (from the past 24 hour(s))   Fingerstick Glucose (POCT)    Collection Time: 03/10/22  2:37 PM   Result Value Ref Range    POC Glucose 145 (H) 65 - 140 mg/dl   Fingerstick Glucose (POCT)    Collection Time: 03/10/22  3:32 PM   Result Value Ref Range    POC Glucose 136 65 - 140 mg/dl   Fingerstick Glucose (POCT)    Collection Time: 03/10/22  4:04 PM   Result Value Ref Range    POC Glucose 146 (H) 65 - 140 mg/dl   Ammonia    Collection Time: 03/10/22  5:00 PM   Result Value Ref Range    Ammonia 16 11 - 35 umol/L   Fingerstick Glucose (POCT)    Collection Time: 03/10/22  5:03 PM   Result Value Ref Range    POC Glucose 154 (H) 65 - 140 mg/dl   Fingerstick Glucose (POCT)    Collection Time: 03/10/22  5:59 PM   Result Value Ref Range    POC Glucose 150 (H) 65 - 140 mg/dl   Fingerstick Glucose (POCT)    Collection Time: 03/10/22  7:13 PM   Result Value Ref Range    POC Glucose 141 (H) 65 - 140 mg/dl   Fingerstick Glucose (POCT)    Collection Time: 03/10/22  8:00 PM   Result Value Ref Range    POC Glucose 148 (H) 65 - 140 mg/dl   Fingerstick Glucose (POCT)    Collection Time: 03/10/22  9:00 PM   Result Value Ref Range    POC Glucose 161 (H) 65 - 140 mg/dl   Fingerstick Glucose (POCT)    Collection Time: 03/10/22  9:57 PM   Result Value Ref Range    POC Glucose 148 (H) 65 - 140 mg/dl   Fingerstick Glucose (POCT)    Collection Time: 03/10/22 10:56 PM   Result Value Ref Range    POC Glucose 165 (H) 65 - 140 mg/dl   Fingerstick Glucose (POCT)    Collection Time: 03/11/22 12:31 AM   Result Value Ref Range    POC Glucose 145 (H) 65 - 140 mg/dl   Fingerstick Glucose (POCT)    Collection Time: 03/11/22 12:45 AM   Result Value Ref Range    POC Glucose 145 (H) 65 - 140 mg/dl   Fingerstick Glucose (POCT)    Collection Time: 03/11/22  1:11 AM   Result Value Ref Range    POC Glucose 144 (H) 65 - 140 mg/dl   Fingerstick Glucose (POCT)    Collection Time: 03/11/22  2:25 AM   Result Value Ref Range    POC Glucose 159 (H) 65 - 140 mg/dl   Basic metabolic panel    Collection Time: 03/11/22  3:09 AM   Result Value Ref Range    Sodium 138 136 - 145 mmol/L    Potassium 3 4 (L) 3 5 - 5 3 mmol/L    Chloride 107 100 - 108 mmol/L    CO2 19 (L) 21 - 32 mmol/L    ANION GAP 12 4 - 13 mmol/L    BUN 30 (H) 5 - 25 mg/dL    Creatinine 2 18 (H) 0 60 - 1 30 mg/dL    Glucose 112 65 - 140 mg/dL    Calcium 8 2 (L) 8 3 - 10 1 mg/dL    eGFR 26 ml/min/1 73sq m   CBC    Collection Time: 03/11/22  3:09 AM   Result Value Ref Range    WBC 5 57 4 31 - 10 16 Thousand/uL    RBC 2 11 (L) 3 88 - 5 62 Million/uL    Hemoglobin 7 6 (L) 12 0 - 17 0 g/dL    Hematocrit 20 3 (L) 36 5 - 49 3 %    MCV 96 82 - 98 fL    MCH 36 0 (H) 26 8 - 34 3 pg    MCHC 37 4 31 4 - 37 4 g/dL    RDW 15 2 (H) 11 6 - 15 1 %    Platelets 735 (L) 369 - 390 Thousands/uL    MPV 11 4 8 9 - 12 7 fL   Procalcitonin Reflex    Collection Time: 03/11/22  3:27 AM   Result Value Ref Range    Procalcitonin 1 09 (H) <=0 25 ng/ml   Fingerstick Glucose (POCT)    Collection Time: 03/11/22  4:36 AM   Result Value Ref Range    POC Glucose 126 65 - 140 mg/dl   Fingerstick Glucose (POCT)    Collection Time: 03/11/22  5:28 AM   Result Value Ref Range    POC Glucose 187 (H) 65 - 140 mg/dl   Fingerstick Glucose (POCT)    Collection Time: 03/11/22  7:29 AM   Result Value Ref Range    POC Glucose 123 65 - 140 mg/dl   Fingerstick Glucose (POCT)    Collection Time: 03/11/22  8:24 AM   Result Value Ref Range    POC Glucose 99 65 - 140 mg/dl   Fingerstick Glucose (POCT)    Collection Time: 03/11/22  9:02 AM   Result Value Ref Range    POC Glucose 125 65 - 140 mg/dl   Fingerstick Glucose (POCT)    Collection Time: 03/11/22 10:03 AM   Result Value Ref Range    POC Glucose 139 65 - 140 mg/dl   Fingerstick Glucose (POCT)    Collection Time: 03/11/22 11:30 AM   Result Value Ref Range    POC Glucose 136 65 - 140 mg/dl   Fingerstick Glucose (POCT)    Collection Time: 03/11/22 12:29 PM   Result Value Ref Range    POC Glucose 125 65 - 140 mg/dl   Fingerstick Glucose (POCT)    Collection Time: 03/11/22  1:06 PM   Result Value Ref Range    POC Glucose 145 (H) 65 - 140 mg/dl   ]  Imaging Studies: I have personally reviewed pertinent reports and I have personally reviewed pertinent films in PACS  EKG, Pathology, and Other Studies: I have personally reviewed pertinent reports  VTE Prophylaxis: Heparin    Counseling / Coordination of Care  Total time spent today 26 minutes  Greater than 50% of total time was spent with the patient and/or family counseling and/or coordination of care  A description of the counseling/coordination of care:  Patient was seen and evaluated  Discussed with attending  Chart reviewed thoroughly including laboratory and imaging studies  Plan of care discussed with patient and primary team  Discussed etiology of encephalopathy with primary team, and discussed recommendations for CXR  Dictation voice to text software has been used in the creation of this document  Please consider this in light of any contextual or grammatical errors

## 2022-03-12 NOTE — ASSESSMENT & PLAN NOTE
Lab Results   Component Value Date    CREATININE 1 99 (H) 03/12/2022    CREATININE 2 18 (H) 03/11/2022    CREATININE 2 23 (H) 03/10/2022     Per chart review, baseline Cr 1 5-1 6  Plan:  · Monitor Cr daily    · Monitor I/O  · Avoid hypotension, nephrotoxins  · Monitor and replete electrolytes as needed

## 2022-03-12 NOTE — QUICK NOTE
Called and updated patient's son  Questions answered       Crystal Range [phone]

## 2022-03-12 NOTE — ASSESSMENT & PLAN NOTE
Lab Results   Component Value Date    HGB 6 7 (LL) 03/12/2022    HGB 7 6 (L) 03/11/2022    HGB 8 5 (L) 03/10/2022   Per CareEverywhere chart review, patient Hgb baseline 10    Plan:  · Slowly trending down, no obvious blood loss  · Monitor CBC

## 2022-03-12 NOTE — PLAN OF CARE
Recommendations: NPO except medications     Recommended Form of Meds: crushed with puree or honey thick liquids    Aspiration precautions and compensatory swallowing strategies: upright posture, only feed when fully alert, slow rate of feeding and small bites/sips    Dysphagia Goals: pt will participate in repeat swallow eval to determine safety of po intake and/or further instrumental testing   and pt will participate in VBS as indicated

## 2022-03-12 NOTE — ASSESSMENT & PLAN NOTE
50-year-old male presented to the ED via AMS from nursing home for hypoglycemia in the 30s that persisted after glucagon administration  Also having altered mental status  Patient was found hypothermic, 92 7  Pulse 30-60s  Blood pressures averaging in 90s/50s, satting >96% on RA  Patient is not on diabetes medications and neither has a history of diabetes  Blood sugars improved after D10 bolus into the 200-300s however continued blood sugar checks were hypoglycemic 22-53  POA unresponsive, was not speaking however improved and was Aaox4  DDx encephalopathy 2/2 metabolic derangements, hypothermia, infection, IVIG noncompliance for treatment of MG, medication error  Plan  · Continue SD 1  · Neurology Following; unclear etiology  · Treating w/ cefepime for concern of occult infection  · No further hypoglycemia events however continues to require supplemental continuous D10 infusion  · D10 NS at 70   Decrease as tolerated  · C-peptide 3 8  · Hypoglycemic protocol  · Cortisol Normal  · Blood cultures negative x48 hours  · Continue to monitor off antibiotics  · PCT 0 08, 0 6, 1 09, 1 09  · U/A remarkable for 1+ ketone, negative for leukocytes/nitrites  · Toxicology consulted; appreciate recs  · Octreotide 50 mcg was given empirically, should cover for sulfonylurea

## 2022-03-12 NOTE — PROGRESS NOTES
[facility]  Progress Note - Tim Park 1935, 81 y o  male MRN: [id number]  Unit/Bed#: S -Roc Encounter: [id number]  Primary Care Provider: Thelda Primrose, MD   Date and time admitted to hospital: 3/8/2022 12:14 PM    * Encephalopathy  Assessment & Plan  81-year-old male presented to the ED via AMS from nursing home for hypoglycemia in the 30s that persisted after glucagon administration  Also having altered mental status  Patient was found hypothermic, 92 7  Pulse 30-60s  Blood pressures averaging in 90s/50s, satting >96% on RA  Patient is not on diabetes medications and neither has a history of diabetes  Blood sugars improved after D10 bolus into the 200-300s however continued blood sugar checks were hypoglycemic 22-53  POA unresponsive, was not speaking however improved and was Aaox4  DDx encephalopathy 2/2 metabolic derangements, hypothermia, infection, IVIG noncompliance for treatment of MG, medication error  Plan  · Continue SD 1  · Neurology Following; unclear etiology  · Treating w/ cefepime for concern of occult infection  · No further hypoglycemia events however continues to require supplemental continuous D10 infusion  · D10 NS at 70   Decrease as tolerated  · C-peptide 3 8  · Hypoglycemic protocol  · Cortisol Normal  · Blood cultures negative x48 hours  · Continue to monitor off antibiotics  · PCT 0 08, 0 6, 1 09, 1 09  · U/A remarkable for 1+ ketone, negative for leukocytes/nitrites  · Toxicology consulted; appreciate recs  · Octreotide 50 mcg was given empirically, should cover for sulfonylurea      Hypoglycemia  Assessment & Plan  Lab Results   Component Value Date    POCGLU 119 03/12/2022    POCGLU 127 03/12/2022    POCGLU 120 03/11/2022    POCGLU 119 03/11/2022    POCGLU 114 03/11/2022    POCGLU 104 03/11/2022    POCGLU 124 03/11/2022    POCGLU 145 (H) 03/11/2022    POCGLU 125 03/11/2022    POCGLU 136 03/11/2022    POCGLU 139 03/11/2022    POCGLU 125 03/11/2022    POCGLU 99 03/11/2022    POCGLU 123 03/11/2022     Ongoing investigation, as to etiology of hypoglycemia  Plan:  · See above for Encephalopathy   · Endocrinology following  · Continue D10 infusion    MG (myasthenia gravis) ([facility] )  Assessment & Plan  Diagnosed over 6 years ago, [facility] with [facility]y and Hematology  Last IVIG infusion documented December 2021, patient is supposed to receive treatments every 4-6 hours per chart review  Patient has an appointment with [facility] neurology on March 22, 2022 for IVIG infusion  Plan  · Neurology consulted; appreciate recs  · Completed 3 doses IVIG  · Monitor O2  · Ach-R and MUSK antibodies  · Normal cortisol     Elevated TSH  Assessment & Plan  · TSH 4 983, T4 1 21     Sepsis (HCC)  Assessment & Plan   Hypothermic, hypotensive, encephalopathic, lactic acidosis 2 3    · Started on cefepime 3/11 for concern of aspiration  · Monitor endpoints      COPD (chronic obstructive pulmonary disease) ([facility] )  Assessment & Plan  Takes duonebs at home    Plan  · Xopenex and Atrovent TID  · Pulmonary toilet    Hypertension  Assessment & Plan  Hypotensive POA    Plan:  · Hold home Norvasc until no longer hypotensive   · BP normotensive, continue to monitor  Chronic anemia  Assessment & Plan  Lab Results   Component Value Date    HGB 6 7 (LL) 03/12/2022    HGB 7 6 (L) 03/11/2022    HGB 8 5 (L) 03/10/2022   Per CareEverywhere chart review, patient Hgb baseline 10    Plan:  · Slowly trending down, no obvious blood loss  · Monitor CBC          SYED (obstructive sleep apnea)  Assessment & Plan  · CPAP qhs as tolerated  · Not currently using due to difficulty clearing upper airway secretions requiring frequent suctioning    Stage 3 chronic kidney disease [facility])  Assessment & Plan  Lab Results   Component Value Date    CREATININE 1 99 (H) 03/12/2022    CREATININE 2 18 (H) 03/11/2022    CREATININE 2 23 (H) 03/10/2022     Per chart review, baseline Cr 1  5-1 6  Plan:  · Monitor Cr daily  · Monitor I/O  · Avoid hypotension, nephrotoxins  · Monitor and replete electrolytes as needed    Pancreatic lesion  Assessment & Plan  Per chart review 2021 - "While inpatient at [facility] he had a CT scan abdomen was concerning for pancreatic mass  MRI noted for 5mm cystic lesion not changed from prior  Continue conservative management at this time "  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     Plan  · 3/8/22 CT c/a/p: PANCREAS:  Unremarkable  · Likely resolved cyst    Malignant neoplasm of prostate [facility])  Assessment & Plan  Prostate cancer s/p seed implants in   Plan  · Holding home terazosin    Benign prostatic hyperplasia  Assessment & Plan  PMHx of prostate cancer 2006 s/p seed implants    Plan  · Holding home terazosin  Currently NPO        ----------------------------------------------------------------------------------------  HPI/24hr events:   Completed 3 doses IVIG  No events overnight  HD stable/afebrile    I/O not accurate 2/2 incontinence    Remains on D10 infusions @ 70/hr    Patient appropriate for transfer out of the ICU today?: Patient does not meet criteria for ICU Follow-up Clinic; referral has not been made     Disposition: Continue Stepdown Level 1 level of care   Code Status: Level 3 - DNAR and DNI  ---------------------------------------------------------------------------------------  SUBJECTIVE  n/a    Review of Systems  Review of systems was reviewed and negative unless stated above in HPI/24-hour events   ---------------------------------------------------------------------------------------  OBJECTIVE    Vitals   Vitals:    22 0500 22 0600 22 0700 22 0800   BP: 133/61 126/83 93/58 115/58   BP Location:    Left arm   Pulse: 77 81 90 79   Resp: (!) 26 (!) 26 (!) 31 (!) 27   Temp:    97 6 °F (36 4 °C)   TempSrc:    Oral   SpO2: 92% 93%  92%   Weight:       Height:         Temp (24hrs), Av 1 °F (36 7 °C), Min:97 6 °F (36 4 °C), Max:98 6 °F (37 °C)  Current: Temperature: 97 6 °F (36 4 °C)          Respiratory:  SpO2: SpO2: 92 %  Nasal Cannula O2 Flow Rate (L/min): 3 L/min    Invasive/non-invasive ventilation settings   Respiratory  Report   Lab Data (Last 4 hours)    None         O2/Vent Data (Last 4 hours)    None                Physical Exam  Vitals reviewed  Constitutional:       Appearance: He is not ill-appearing or toxic-appearing  HENT:      Head: Normocephalic  Nose: Nose normal       Mouth/Throat:      Comments: Moist oropharyngeal secretions with inability to cough  Eyes:      Pupils: Pupils are equal, round, and reactive to light  Cardiovascular:      Rate and Rhythm: Normal rate and regular rhythm  Pulses: Normal pulses  Pulmonary:      Effort: Tachypnea present  No accessory muscle usage  Abdominal:      Tenderness: There is no abdominal tenderness  Musculoskeletal:      Cervical back: Normal range of motion  Skin:     General: Skin is warm and dry  Capillary Refill: Capillary refill takes less than 2 seconds  Neurological:      GCS: GCS eye subscore is 4  GCS verbal subscore is 3  GCS motor subscore is 5        Comments: Limited exam 2/2 participation             Laboratory and Diagnostics:  Results from last 7 days   Lab Units 03/12/22  0519 03/11/22  0309 03/10/22  0609 03/09/22  0720 03/08/22  2212 03/08/22  1310   WBC Thousand/uL 6 17 5 57 7 31 8 61  --  9 79   HEMOGLOBIN g/dL 6 7* 7 6* 8 5* 8 9*  --  10 3*   HEMATOCRIT % 18 3* 20 3* 23 2* 25 0*  --  28 6*   PLATELETS Thousands/uL 138* 140* 142* 149 175 165   NEUTROS PCT % 85*  --   --  85*  --  83*   MONOS PCT % 5  --   --  7  --  7     Results from last 7 days   Lab Units 03/12/22  0519 03/11/22  0309 03/10/22  0609 03/09/22  0603 03/08/22  1310   SODIUM mmol/L 144 138 134* 142 148*   POTASSIUM mmol/L 3 4* 3 4* 3 7 3 9 3 4*   CHLORIDE mmol/L 114* 107 106 112* 115*   CO2 mmol/L 16* 19* 17* 20* 23   ANION GAP mmol/L 14* 12 11 10 10   BUN mg/dL 25 30* 30* 36* 51*   CREATININE mg/dL 1 99* 2 18* 2 23* 1 70* 1 66*   CALCIUM mg/dL 8 3 8 2* 8 3 8 5 9 2   GLUCOSE RANDOM mg/dL 115 112 88 59* 37*   ALT U/L  --   --   --  49 65   AST U/L  --   --   --  42 55*   ALK PHOS U/L  --   --   --  95 116   ALBUMIN g/dL  --   --   --  2 0* 2 6*   TOTAL BILIRUBIN mg/dL  --   --   --  0 28 0 31     Results from last 7 days   Lab Units 03/12/22  0519   MAGNESIUM mg/dL 1 8      Results from last 7 days   Lab Units 03/08/22  1311   INR  1 14   PTT seconds 55*          Results from last 7 days   Lab Units 03/08/22  1520 03/08/22  1310   LACTIC ACID mmol/L 2 0 2 3*     ABG:    VBG:    Results from last 7 days   Lab Units 03/11/22  0327 03/10/22  0609 03/09/22  0603 03/08/22  1311   PROCALCITONIN ng/ml 1 09* 1 06* 0 60* 0 08       Micro  Results from last 7 days   Lab Units 03/08/22  1316   BLOOD CULTURE  Received in Microbiology Lab  Culture in Progress  Received in Microbiology Lab  Culture in Progress  EKG: NSR  Imaging: I have personally reviewed pertinent reports  Intake and Output  I/O       03/10 0701 03/11 0700 03/11 0701  03/12 0700 03/12 0701  03/13 0700    P  O  0      I V  (mL/kg) 1604 2 (21 2)  1000 (13 2)    IV Piggyback   100    Total Intake(mL/kg) 1604 2 (21 2)  1100 (14 5)    Urine (mL/kg/hr) 825 (0 5) 150 (0 1)     Total Output 825 150     Net +779 2 -150 +1100           Unmeasured Urine Occurrence  3 x           Height and Weights   Height: 5' 5" (165 1 cm)  IBW (Ideal Body Weight): 61 5 kg  Body mass index is 27 81 kg/m²  Weight (last 2 days)     Date/Time Weight    03/10/22 0240 75 8 (167 11)            Nutrition       Diet Orders   (From admission, onward)             Start     Ordered    03/08/22 2039  Diet NPO; Sips with meds  Diet effective now        References:    Nutrtion Support Algorithm Enteral vs  Parenteral   Question Answer Comment   Diet Type NPO    NPO Except: Sips with meds    RD to adjust diet per protocol? Yes        03/08/22 2038 03/08/22 1830  Room Service  Once        Question:  Type of Service  Answer:  Room Service - Appropriate with Assistance    03/08/22 1829                  Active Medications  Scheduled Meds:  Current Facility-Administered Medications   Medication Dose Route Frequency Provider Last Rate    cefepime  1,000 mg Intravenous Q12H JOSE Stoll 1,000 mg (03/12/22 0148)    dextrose 10 % and sodium chloride 0 9 %  70 mL/hr Intravenous Continuous JOSE Stoll 70 mL/hr (03/12/22 0149)    heparin (porcine)  5,000 Units Subcutaneous [facility] Reanna Mathis MD      ipratropium  0 5 mg Nebulization TID Kylah Walker DO      levalbuterol  1 25 mg Nebulization TID Reanna Mathis MD      pantoprazole  40 mg Intravenous Q12H [address] Reanna Mathis MD      potassium chloride  20 mEq Intravenous Once [facility], PA-C      potassium chloride  20 mEq Intravenous Once [facility], PA-C 20 mEq (03/12/22 0739)    sodium chloride (PF)  3 mL Intravenous Q1H PRN Reanna Mathis MD       Continuous Infusions:  dextrose 10 % and sodium chloride 0 9 %, 70 mL/hr, Last Rate: 70 mL/hr (03/12/22 0149)      PRN Meds:   sodium chloride (PF), 3 mL, Q1H PRN        Invasive Devices Review  Invasive Devices  Report    Peripheral Intravenous Line            Peripheral IV 03/08/22 Distal;Right;Upper;Ventral (anterior) Arm 3 days    Peripheral IV 03/12/22 Right;Ventral (anterior) Forearm <1 day                Rationale for remaining devices: n/a  ---------------------------------------------------------------------------------------  Advance Directive and Living Will:      Power of :    POLST:    ---------------------------------------------------------------------------------------  Care Time Delivered:   No Critical Care time spent       [facility]      Portions of the record may have been created with voice recognition software    Occasional wrong word or "sound a like" substitutions may have occurred due to the inherent limitations of voice recognition software    Read the chart carefully and recognize, using context, where substitutions have occurred

## 2022-03-12 NOTE — ASSESSMENT & PLAN NOTE
Hypothermic, hypotensive, encephalopathic, lactic acidosis 2 3    · Started on cefepime 3/11 for concern of aspiration  · Monitor endpoints

## 2022-03-12 NOTE — ASSESSMENT & PLAN NOTE
Diagnosed over 6 years ago, follows with [facility] neurology and Hematology  Last IVIG infusion documented December 2021, patient is supposed to receive treatments every 4-6 hours per chart review  Patient has an appointment with [facility] neurology on March 22, 2022 for IVIG infusion      Plan  · Neurology consulted; appreciate recs  · Completed 3 doses IVIG  · Monitor O2  · Ach-R and MUSK antibodies  · Normal cortisol

## 2022-03-12 NOTE — SPEECH THERAPY NOTE
Speech-Language Pathology Bedside Swallow Evaluation        Patient Name: Idalia Treviño    ZFYAW'Y Date: 3/12/2022     Problem List  Patient Active Problem List   Diagnosis    Benign prostatic hyperplasia    Degeneration of thoracic intervertebral disc    Hearing loss    Malignant [facility] prostate ([facility] )    Antral ulcer    Pancreatic lesion    Ambulatory dysfunction    [facility]    Stage 3 chronic kidney disease ([facility] )    Frailty syndrome in geriatric patient    Intravenous infiltration    Encephalopathy    SYED (obstructive sleep apnea)    Chronic anemia    Hypertension    COPD (chronic obstructive pulmonary disease) (HCC)    Sepsis ([facility] )    Hypoglycemia    Elevated TSH       Past Medical History  Past Medical History:   Diagnosis Date    Cancer ([place] [address] )     Kidney disease     SYED (obstructive sleep apnea)     Prostate cancer ([facility] )     Rotator cuff rupture 10/20/2006       Past Surgical History  History reviewed  No pertinent surgical history  Current Medical Status  Pt is a 81 y o  male who presented to [address] [place] [address] with AMS in the setting of hypoglycemia, hypotension, hypothermia and multiple metabolic disturbances  PMHX includes myasthenia gravis (primarily occular), hearing deficit, HTN, baseline dysphagia, COPD, sleep apnea, CKD stage 3, and prostate cancer  He has been encephalopathic since admission with unclear etiology  Questionable if due to infection   IVIG noncompliance,  medication error, metabolic disbalance, hypothermia, hypoglycemia  He is improving although there is some concern for pulmonary infection despite clear CT  ST consult requested to assess swallow function  On 3/10 patient noted to have reduced secretions management with increased wet secretions  Past medical history:   Please see H&P for details    Special Studies:  3/8 CT Chest abd pelvis: Small bibasilar pleural effusions with cardiomegaly   13 mm right upper lobe groundglass opacity #3/29  Follow-up with repeat CT chest in one year      No acute findings in the abdomen or the pelvis    3/8 CT Head: No acute intracranial abnormality  Social/Education/Vocational Hx:  Pt lives in SNF/ECF    Swallow Information   Current Risks for Dysphagia & Aspiration: known history of dysphagia     Current Symptoms/Concerns: wet respirations, decreased secretion management    Current Diet: NPO      Baseline Diet: unknown      Baseline Assessment   Behavior/Cognition: lethargic and decreased attention    Speech/Language Status: not able to to follow commands and limited verbal output- significantly [place]    Patient Positioning: upright in bed    Swallow Mechanism Exam   Facial: symmetrical  Labial: unable to test 2/2 limited command following  Lingual: unable to test 2/2 limited command following  Velum: unable to visualize  Mandible: adequate ROM  Dentition: limited dentition  Vocal quality:gurgly   Volitional Cough: fair -  Resp: 3L NC    Consistencies Assessed and Performance   Consistencies Administered: nectar thick, honey thick and puree  Specific materials administered included: applesauce, honey thick and nectar thick liquids    Oral Stage:  Oral acceptance and containment were adequate  Bolus formation and transfer were slow but functional with no significant oral residue noted  Mild lingual pumping and tremors noted today  No overt s/s reduced oral control  Pharyngeal Stage:  Swallowing initiation appeared prompt to mildly dealyed  Laryngeal rise was palpated and judged to be within functional limits  Cannot r/o pharyngeal retention given increasing wet vocal quality as puree progressed  Wet vocal quality was also noted across all nectar thick trials with tap and cup sips with eventual coughing  No coughing or wet vocal quality noted with small controlled presentations of puree or honey thick liquids  Episodic throat clear was also noted throughout trials today   SPO2 frequently dropped however I suspect readings were unreliable  No additional coughing, throat clearing, change in vocal quality or respiratory status noted today  Esophageal Concerns: none reported    Summary   s/s suggestive of mild oral and suspected at least  moderate pharyngeal dysphagia with the materials administered today  Cannot r/o silent aspiration given sxs noted and in the setting of ongoing lethargy/encephalopathy with h/o dysphagia/ myasthenia gravis  Patient may benefit from additional objective assessment of the swallow  Recommendations: NPO except medications     Recommended Form of Meds: crushed with puree or honey thick liquids    Aspiration precautions and compensatory swallowing strategies: upright posture, only feed when fully alert, slow rate of feeding and small bites/sips    Results Reviewed with: patient and RN     Dysphagia Goals: pt will participate in repeat swallow eval to determine safety of po intake and/or further instrumental testing   and pt will participate in VBS as indicated    Plan  Will continue to follow    BRADLEY Hernandez S , [id number] [facility]  Speech Language Pathologist   Available via [address] [place] [address] #[id number]  [place] #[id number]

## 2022-03-12 NOTE — ASSESSMENT & PLAN NOTE
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

## 2022-03-13 NOTE — ASSESSMENT & PLAN NOTE
Lab Results   Component Value Date    POCGLU 88 03/13/2022    POCGLU 94 03/13/2022    POCGLU 105 03/13/2022    POCGLU 112 03/12/2022    POCGLU 104 03/12/2022    POCGLU 106 03/12/2022    POCGLU 119 03/12/2022    POCGLU 90 03/12/2022    POCGLU 125 03/12/2022    POCGLU 113 03/12/2022    POCGLU 119 03/12/2022    POCGLU 127 03/12/2022    POCGLU 120 03/11/2022    POCGLU 119 03/11/2022     Ongoing investigation, as to etiology of hypoglycemia      Plan:  · See above for Encephalopathy   · Blood glucose stable  · Endocrinology following  · Continue D51/2 NS + K

## 2022-03-13 NOTE — PLAN OF CARE
Problem: Potential for Falls  Goal: Patient will remain free of falls  Description: INTERVENTIONS:  - Educate patient/family on patient safety including physical limitations  - Instruct patient to call for assistance with activity   - Consult OT/PT to assist with strengthening/mobility   - Keep Call bell within reach  - Keep bed low and locked with side rails adjusted as appropriate  - Keep care items and personal belongings within reach  - Initiate and maintain comfort rounds  - Make Fall Risk Sign visible to staff    - Apply yellow socks and bracelet for high fall risk patients  - Consider moving patient to room near nurses station  Outcome: Progressing     Problem: MOBILITY - ADULT  Goal: Maintain or return to baseline ADL function  Description: INTERVENTIONS:  -  Assess patient's ability to carry out ADLs; assess patient's baseline for ADL function and identify physical deficits which impact ability to perform ADLs (bathing, care of mouth/teeth, toileting, grooming, dressing, etc )  - Assess/evaluate cause of self-care deficits   - Assess range of motion  - Assess patient's mobility; develop plan if impaired  - Assess patient's need for assistive devices and provide as appropriate  - Encourage maximum independence but intervene and supervise when necessary  - Involve family in performance of ADLs  - Assess for home care needs following discharge   - Consider OT consult to assist with ADL evaluation and planning for discharge  - Provide patient education as appropriate  Outcome: Progressing  Goal: Maintains/Returns to pre admission functional level  Description: INTERVENTIONS:  - Perform BMAT or MOVE assessment daily    - Set and communicate daily mobility goal to care team and patient/family/caregiver     - Collaborate with rehabilitation services on mobility goals if consulted    - Out of bed for toileting  - Record patient progress and toleration of activity level   Outcome: Progressing     Problem: NEUROSENSORY - ADULT  Goal: Achieves stable or improved neurological status  Description: INTERVENTIONS  - Monitor and report changes in neurological status  - Monitor vital signs such as temperature, blood pressure, glucose, and any other labs ordered   - Initiate measures to prevent increased intracranial pressure  - Monitor for seizure activity and implement precautions if appropriate      Outcome: Progressing  Goal: Achieves maximal functionality and self care  Description: INTERVENTIONS  - Monitor swallowing and airway patency with patient fatigue and changes in neurological status  - Encourage and assist patient to increase activity and self care     - Encourage visually impaired, hearing impaired and aphasic patients to use assistive/communication devices  Outcome: Progressing     Problem: DISCHARGE PLANNING - CARE MANAGEMENT  Goal: Discharge to post-acute care or home with appropriate resources  Description: INTERVENTIONS:  - Conduct assessment to determine patient/family and health care team treatment goals, and need for post-acute services based on payer coverage, community resources, and patient preferences, and barriers to discharge  - Address psychosocial, clinical, and financial barriers to discharge as identified in assessment in conjunction with the patient/family and health care team  - Arrange appropriate level of post-acute services according to patients   needs and preference and payer coverage in collaboration with the physician and health care team  - Communicate with and update the patient/family, physician, and health care team regarding progress on the discharge plan  - Arrange appropriate transportation to post-acute venues  Outcome: Progressing     Problem: PAIN - ADULT  Goal: Verbalizes/displays adequate comfort level or baseline comfort level  Description: Interventions:  - Encourage patient to monitor pain and request assistance  - Assess pain using appropriate pain scale  - Administer analgesics based on type and severity of pain and evaluate response  - Implement non-pharmacological measures as appropriate and evaluate response  - Consider cultural and social influences on pain and pain management  - Notify physician/advanced practitioner if interventions unsuccessful or patient reports new pain  Outcome: Progressing     Problem: INFECTION - ADULT  Goal: Absence or prevention of progression during hospitalization  Description: INTERVENTIONS:  - Assess and monitor for signs and symptoms of infection  - Monitor lab/diagnostic results  - Monitor all insertion sites, i e  indwelling lines, tubes, and drains  - Monitor endotracheal if appropriate and nasal secretions for changes in amount and color  - Davidson appropriate cooling/warming therapies per order  - Administer medications as ordered  - Instruct and encourage patient and family to use good hand hygiene technique  - Identify and instruct in appropriate isolation precautions for identified infection/condition  Outcome: Progressing  Goal: Absence of fever/infection during neutropenic period  Description: INTERVENTIONS:  - Monitor WBC    Outcome: Progressing     Problem: SAFETY ADULT  Goal: Patient will remain free of falls  Description: INTERVENTIONS:  - Educate patient/family on patient safety including physical limitations  - Instruct patient to call for assistance with activity   - Consult OT/PT to assist with strengthening/mobility   - Keep Call bell within reach  - Keep bed low and locked with side rails adjusted as appropriate  - Keep care items and personal belongings within reach  - Initiate and maintain comfort rounds  - Make Fall Risk Sign visible to staff    - Apply yellow socks and bracelet for high fall risk patients  - Consider moving patient to room near nurses station  Outcome: Progressing  Goal: Maintain or return to baseline ADL function  Description: INTERVENTIONS:  -  Assess patient's ability to carry out ADLs; assess patient's baseline for ADL function and identify physical deficits which impact ability to perform ADLs (bathing, care of mouth/teeth, toileting, grooming, dressing, etc )  - Assess/evaluate cause of self-care deficits   - Assess range of motion  - Assess patient's mobility; develop plan if impaired  - Assess patient's need for assistive devices and provide as appropriate  - Encourage maximum independence but intervene and supervise when necessary  - Involve family in performance of ADLs  - Assess for home care needs following discharge   - Consider OT consult to assist with ADL evaluation and planning for discharge  - Provide patient education as appropriate  Outcome: Progressing  Goal: Maintains/Returns to pre admission functional level  Description: INTERVENTIONS:  - Perform BMAT or MOVE assessment daily    - Set and communicate daily mobility goal to care team and patient/family/caregiver     - Collaborate with rehabilitation services on mobility goals if consulted    - Out of bed for toileting  - Record patient progress and toleration of activity level   Outcome: Progressing     Problem: DISCHARGE PLANNING  Goal: Discharge to home or other facility with appropriate resources  Description: INTERVENTIONS:  - Identify barriers to discharge w/patient and caregiver  - Arrange for needed discharge resources and transportation as appropriate  - Identify discharge learning needs (meds, wound care, etc )  - Arrange for interpretive services to assist at discharge as needed  - Refer to Case Management Department for coordinating discharge planning if the patient needs post-hospital services based on physician/advanced practitioner order or complex needs related to functional status, cognitive ability, or social support system  Outcome: Progressing     Problem: Knowledge Deficit  Goal: Patient/family/caregiver demonstrates understanding of disease process, treatment plan, medications, and discharge instructions  Description: Complete learning assessment and assess knowledge base  Interventions:  - Provide teaching at level of understanding  - Provide teaching via preferred learning methods  Outcome: Progressing     Problem: Prexisting or High Potential for Compromised Skin Integrity  Goal: Skin integrity is maintained or improved  Description: INTERVENTIONS:  - Identify patients at risk for skin breakdown  - Assess and monitor skin integrity  - Assess and monitor nutrition and hydration status  - Monitor labs   - Assess for incontinence   - Turn and reposition patient  - Assist with mobility/ambulation  - Relieve pressure over bony prominences  - Avoid friction and shearing  - Provide appropriate hygiene as needed including keeping skin clean and dry  - Evaluate need for skin moisturizer/barrier cream  - Collaborate with interdisciplinary team   - Patient/family teaching  - Consider wound care consult   Outcome: Progressing     Problem: Nutrition/Hydration-ADULT  Goal: Nutrient/Hydration intake appropriate for improving, restoring or maintaining nutritional needs  Description: Monitor and assess patient's nutrition/hydration status for malnutrition  Collaborate with interdisciplinary team and initiate plan and interventions as ordered  Monitor patient's weight and dietary intake as ordered or per policy  Utilize nutrition screening tool and intervene as necessary  Determine patient's food preferences and provide high-protein, high-caloric foods as appropriate       INTERVENTIONS:  - Monitor oral intake, urinary output, labs, and treatment plans  - Assess nutrition and hydration status and recommend course of action  - Evaluate amount of meals eaten  - Assist patient with eating if necessary   - Allow adequate time for meals  - Recommend/ encourage appropriate diets, oral nutritional supplements, and vitamin/mineral supplements  - Order, calculate, and assess calorie counts as needed  - Recommend, monitor, and adjust tube feedings and TPN/PPN based on assessed needs  - Assess need for intravenous fluids  - Provide specific nutrition/hydration education as appropriate  - Include patient/family/caregiver in decisions related to nutrition  Outcome: Progressing

## 2022-03-13 NOTE — PROGRESS NOTES
[facility]  Progress Note - Khari De Los Santos 1935, 81 y o  male MRN: [id number]  Unit/Bed#: S -Roc Encounter: [id number]  Primary Care Provider: Ariel Lynch MD   Date and time admitted to hospital: 3/8/2022 12:14 PM    * Encephalopathy  Assessment & Plan  21-year-old male presented to the ED via AMS from nursing home for hypoglycemia in the 30s that persisted after glucagon administration  Also having altered mental status  Patient was found hypothermic, 92 7  Pulse 30-60s  Blood pressures averaging in 90s/50s, satting >96% on RA  Patient is not on diabetes medications and neither has a history of diabetes  Blood sugars improved after D10 bolus into the 200-300s however continued blood sugar checks were hypoglycemic 22-53  POA unresponsive, was not speaking however improved and was Aaox4  DDx encephalopathy 2/2 metabolic derangements, hypothermia, infection, IVIG noncompliance for treatment of MG, medication error  Plan  · Continue SD 1  · Neurology Following; unclear etiology  · Treating w/ cefepime for concern of occult infection  · No further hypoglycemia events however continues to require supplemental continuous D10 infusion  · D10 NS at 70  Decrease as tolerated  · C-peptide 3 8  · Hypoglycemic protocol  · Cortisol Normal  · Blood cultures negative x48 hours  Sputum culture 1+ MRSA   Will consider treating with vanco  · Continue to monitor off antibiotics  · PCT 0 08, 0 6, 1 09, 1 09  · U/A remarkable for 1+ ketone, negative for leukocytes/nitrites  · Toxicology consulted; appreciate recs  · Octreotide 50 mcgwas given empirically this admit, should cover for sulfonylurea      Hypoglycemia  Assessment & Plan  Lab Results   Component Value Date    POCGLU 88 03/13/2022    POCGLU 94 03/13/2022    POCGLU 105 03/13/2022    POCGLU 112 03/12/2022    POCGLU 104 03/12/2022    POCGLU 106 03/12/2022    POCGLU 119 03/12/2022    POCGLU 90 03/12/2022    POCGLU 125 03/12/2022 POCGLU 113 03/12/2022    POCGLU 119 03/12/2022    POCGLU 127 03/12/2022    POCGLU 120 03/11/2022    POCGLU 119 03/11/2022     Ongoing investigation, as to etiology of hypoglycemia  Plan:  · See above for Encephalopathy   · Blood glucose stable  · Endocrinology following  · Continue D51/2 NS + K    MG (myasthenia gravis) ([facility] [address] )  Assessment & Plan  Diagnosed over 6 years ago, follows with [facility] neurology and Hematology  Last IVIG infusion documented December 2021, patient is supposed to receive treatments every 4-6 hours per chart review  Patient has an appointment with [facility] neurology on March 22, 2022 for IVIG infusion  Plan  · Neurology consulted; appreciate recs  · Completed 3 doses IVIG  · Monitor O2  · Ach-R and MUSK antibodies  · Normal cortisol     Elevated TSH  Assessment & Plan  · TSH 4 983, T4 1 21     Sepsis (HCC)  Assessment & Plan   Hypothermic, hypotensive, encephalopathic, lactic acidosis 2 3    · Started on cefepime 3/11 for concern of aspiration  · Monitor endpoints      COPD (chronic obstructive pulmonary disease) ([facility] [address] )  Assessment & Plan  Takes duonebs at home    Plan  · Xopenex and Atrovent TID  · Pulmonary toilet    Hypertension  Assessment & Plan  Hypotensive POA    Plan:  · Consider restarting norvasc  · BP normotensive, continue to monitor  Chronic anemia  Assessment & Plan  Lab Results   Component Value Date    HGB 7 4 (L) 03/13/2022    HGB 7 7 (L) 03/12/2022    HGB 6 7 (LL) 03/12/2022   Per CareEverywhere chart review, patient Hgb baseline 10    Plan:  · Monitor CBC          SYED (obstructive sleep apnea)  Assessment & Plan  · CPAP qhs as tolerated  · Not currently using due to difficulty clearing upper airway secretions requiring frequent suctioning    Stage 3 chronic kidney disease [facility])  Assessment & Plan  Lab Results   Component Value Date    CREATININE 1 78 (H) 03/13/2022    CREATININE 2 18 (H) 03/12/2022    CREATININE 1 99 (H) 03/12/2022     Per chart review, baseline Cr 1 5-1 6  Plan:  · Monitor Cr daily  · Monitor I/O  · Avoid hypotension, nephrotoxins  · Monitor and replete electrolytes as needed    Pancreatic lesion  Assessment & Plan  Per chart review 12/2021 - "While inpatient at [facility] he had a CT scan abdomen was concerning for pancreatic mass  MRI noted for 5mm cystic lesion not changed from prior  Continue conservative management at this time "  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     Plan  · 3/8/22 CT c/a/p: PANCREAS:  Unremarkable  · Likely resolved cyst    Malignant neoplasm of prostate [facility])  Assessment & Plan  Prostate cancer s/p seed implants in 2006  Plan  · Holding home terazosin  Will resume if able to take po    Benign prostatic hyperplasia  Assessment & Plan  PMHx of prostate cancer 2006 s/p seed implants    Plan  · Holding home terazosin  Currently NPO      ----------------------------------------------------------------------------------------  HPI/24hr events: no significant events overnight  HD stable    On 3L O2  Sputum from 3/11: 1+ MRSA  This am, rectal temp 93 3      Patient appropriate for transfer out of the ICU today?: No  Disposition: Continue Stepdown Level 1 level of care   Code Status: Level 3 - DNAR and DNI  ---------------------------------------------------------------------------------------  SUBJECTIVE  none    Review of Systems   Unable to perform ROS: Mental status change   All other systems reviewed and are negative      Review of systems was reviewed and negative unless stated above in HPI/24-hour events   ---------------------------------------------------------------------------------------  OBJECTIVE    Vitals   Vitals:    03/13/22 0800 03/13/22 0830 03/13/22 0957 03/13/22 1100   BP:    134/52   BP Location:    Left arm   Pulse:    66   Resp:       Temp: (!) 93 3 °F (34 1 °C)  (!) 94 4 °F (34 7 °C) (!) 96 8 °F (36 °C)   TempSrc: Rectal  Axillary Axillary   SpO2:  99%     Weight:       Height:         Temp (24hrs), Av 2 °F (35 7 °C), Min:93 3 °F (34 1 °C), Max:97 5 °F (36 4 °C)  Current: Temperature: (!) 96 8 °F (36 °C)          Respiratory:  SpO2: SpO2: 97 %  Nasal Cannula O2 Flow Rate (L/min): 3 L/min    Invasive/non-invasive ventilation settings   Respiratory  Report   Lab Data (Last 4 hours)    None         O2/Vent Data (Last 4 hours)    None                Physical Exam  Constitutional:       General: He is not in acute distress  Appearance: He is ill-appearing  HENT:      Head: Normocephalic  Mouth/Throat:      Mouth: Mucous membranes are dry  Eyes:      Pupils: Pupils are equal, round, and reactive to light  Cardiovascular:      Rate and Rhythm: Normal rate and regular rhythm  Pulses: Normal pulses  Pulmonary:      Effort: Pulmonary effort is normal    Musculoskeletal:         General: Normal range of motion  Cervical back: Normal range of motion  Skin:     General: Skin is warm and dry  Capillary Refill: Capillary refill takes less than 2 seconds  Neurological:      General: No focal deficit present  Mental Status: He is disoriented and confused  GCS: GCS eye subscore is 3  GCS verbal subscore is 4  GCS motor subscore is 6     Psychiatric:         Attention and Perception: Attention normal          Mood and Affect: Mood normal              Laboratory and Diagnostics:  Results from last 7 days   Lab Units 22  0600 22  1427 22  0937 22  0519 22  0309 03/10/22  0609 22  0720 22  2212 22  1310 22  1310   WBC Thousand/uL 5 81  --   --  6 17 5 57 7 31 8 61  --   --  9 79   HEMOGLOBIN g/dL 7 4* 7 7* 6 7* 6 7* 7 6* 8 5* 8 9*  --    < > 10 3*   HEMATOCRIT % 19 9*  --   --  18 3* 20 3* 23 2* 25 0*  --   --  28 6*   PLATELETS Thousands/uL 159  --   --  138* 140* 142* 149 175  --  165   NEUTROS PCT % 77*  --   --  85*  --   --  85*  --   --  83*   MONOS PCT % 7  --   --  5  --   --  7  --   --  7    < > = values in this interval not displayed  Results from last 7 days   Lab Units 03/13/22  0600 03/12/22  1427 03/12/22  0519 03/11/22  0309 03/10/22  0609 03/09/22  0603 03/08/22  1310   SODIUM mmol/L 145 144 144 138 134* 142 148*   POTASSIUM mmol/L 3 2* 3 7 3 4* 3 4* 3 7 3 9 3 4*   CHLORIDE mmol/L 115* 114* 114* 107 106 112* 115*   CO2 mmol/L 18* 16* 16* 19* 17* 20* 23   ANION GAP mmol/L 12 14* 14* 12 11 10 10   BUN mg/dL 23 26* 25 30* 30* 36* 51*   CREATININE mg/dL 1 78* 2 18* 1 99* 2 18* 2 23* 1 70* 1 66*   CALCIUM mg/dL 9 2 8 9 8 3 8 2* 8 3 8 5 9 2   GLUCOSE RANDOM mg/dL 73 107 115 112 88 59* 37*   ALT U/L 70  --   --   --   --  49 65   AST U/L 62*  --   --   --   --  42 55*   ALK PHOS U/L 114  --   --   --   --  95 116   ALBUMIN g/dL 1 6*  --   --   --   --  2 0* 2 6*   TOTAL BILIRUBIN mg/dL 0 67  --   --   --   --  0 28 0 31     Results from last 7 days   Lab Units 03/13/22  0600 03/12/22  1427 03/12/22  0519   MAGNESIUM mg/dL 1 9 1 9 1 8   PHOSPHORUS mg/dL 3 2  --   --       Results from last 7 days   Lab Units 03/13/22  0600 03/08/22  1311   INR  1 28* 1 14   PTT seconds  --  55*          Results from last 7 days   Lab Units 03/13/22  0600 03/12/22  2201 03/12/22  1427 03/12/22  0937 03/08/22  1520 03/08/22  1310   LACTIC ACID mmol/L 2 4* 2 5* 3 6* 2 3* 2 0 2 3*     ABG:    VBG:    Results from last 7 days   Lab Units 03/11/22  0327 03/10/22  0609 03/09/22  0603 03/08/22  1311   PROCALCITONIN ng/ml 1 09* 1 06* 0 60* 0 08       Micro  Results from last 7 days   Lab Units 03/11/22  1333 03/08/22  1316   BLOOD CULTURE   --  No Growth After 5 Days  No Growth After 5 Days  SPUTUM CULTURE  1+ Growth of Methicillin Resistant Staphylococcus aureus*  1+ Growth of   --    GRAM STAIN RESULT  2+ Epithelial cells per low power field*  4+ Polys*  1+ Gram positive rods*  1+ Gram positive cocci in pairs*  1+ Gram positive cocci in clusters*  --        EKG: nsr  Imaging: I have personally reviewed pertinent reports        Intake and Output  I/O       03/11 0701 03/12 0700 03/12 0701  03/13 0700 03/13 0701 03/14 0700    P  O        I V  (mL/kg)  1848 8 (24)     IV Piggyback  100     Total Intake(mL/kg)  1948 8 (25 3)     Urine (mL/kg/hr) 150 (0 1)      Total Output 150      Net -150 +1948 8            Unmeasured Urine Occurrence 3 x 3 x     Unmeasured Stool Occurrence  0 x           Height and Weights   Height: 5' 5" (165 1 cm)  IBW (Ideal Body Weight): 61 5 kg  Body mass index is 28 25 kg/m²  Weight (last 2 days)     Date/Time Weight    03/13/22 0533 77 (169 75)    03/13/22 0503 77 (169 75)    03/13/22 0321 76 6 (168 87)            Nutrition       Diet Orders   (From admission, onward)             Start     Ordered    03/08/22 2039  Diet NPO; Sips with meds  Diet effective now        References:    Nutrtion Support Algorithm Enteral vs  Parenteral   Question Answer Comment   Diet Type NPO    NPO Except: Sips with meds    RD to adjust diet per protocol?  Yes        03/08/22 2038 03/08/22 1830  Room Service  Once        Question:  Type of Service  Answer:  Room Service - Appropriate with Assistance    03/08/22 1829                  Active Medications  Scheduled Meds:  Current Facility-Administered Medications   Medication Dose Route Frequency Provider Last Rate    cefepime  1,000 mg Intravenous Q12H JOSE Acevedo 1,000 mg (03/13/22 0158)    dextrose 5 % and sodium chloride 0 45 % with KCl 20 mEq/L  75 mL[facility]hr [facility]ous [facility] (03/13/22 0500)    heparin (porcine)  5,000 Units Subcutaneous [facility] Cherry Waggoner MD      ipratropium  0 5 mg Nebulization TID Bear Moy DO      levalbuterol  1 25 mg Nebulization TID Cherry Waggoner MD      magnesium sulfate  2 g [facility]venous [facility]      pantoprazole  40 mg Intravenous Q12H [facility] Cherry Waggoner MD      potassium chloride  20 mEq Intravenous Once [facility] 20 mEq (03/13/22 0940)    potassium chloride  40 mEq Oral Once CONRAD BRENNER      sodium chloride (PF)  3 mL Intravenous Q1H PRN Donnie Sanches MD      thiamine  100 mg Intravenous Daily CONRAD BRENNER 100 mg (03/13/22 0809)     Continuous Infusions:  dextrose 5 % and sodium chloride 0 45 % with KCl 20 mEq/L, 75 mL/hr, Last Rate: 75 mL/hr (03/13/22 0500)      PRN Meds:   sodium chloride (PF), 3 mL, Q1H PRN        Invasive Devices Review  Invasive Devices  Report    Peripheral Intravenous Line            Peripheral IV 03/12/22 Right;Ventral (anterior) Forearm 1 day                Rationale for remaining devices: n/a  ---------------------------------------------------------------------------------------  Advance Directive and Living Will:      Power of :    POLST:    ---------------------------------------------------------------------------------------  Care Time Delivered:   see attending attestation      CONRAD BRENNER      Portions of the record may have been created with voice recognition software  Occasional wrong word or "sound a like" substitutions may have occurred due to the inherent limitations of voice recognition software    Read the chart carefully and recognize, using context, where substitutions have occurred

## 2022-03-13 NOTE — ASSESSMENT & PLAN NOTE
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

## 2022-03-13 NOTE — ACP (ADVANCE CARE PLANNING)
Son Tania Fong present at bedside pulled me aside and told me that he believes patient is not eating because he has lost his will to live  The patient reportedly has been verbalizing over the past several months that he wants to die  While son was visiting patient today, he continued verbalizing that he just wants to die  I expressed to the son that the medical team can focus on patient's comfort instead of disease treatment care  Son thinks that he would want his father to return to [address] [place] [address] under hospice care but would like more information  I placed a hospice consult via case management and advised the son that someone will likely reach out to him tomorrow  For now, we will continue current care

## 2022-03-13 NOTE — ASSESSMENT & PLAN NOTE
18-year-old male presented to the ED via AMS from nursing home for hypoglycemia in the 30s that persisted after glucagon administration  Also having altered mental status  Patient was found hypothermic, 92 7  Pulse 30-60s  Blood pressures averaging in 90s/50s, satting >96% on RA  Patient is not on diabetes medications and neither has a history of diabetes  Blood sugars improved after D10 bolus into the 200-300s however continued blood sugar checks were hypoglycemic 22-53  POA unresponsive, was not speaking however improved and was Aaox4  DDx encephalopathy 2/2 metabolic derangements, hypothermia, infection, IVIG noncompliance for treatment of MG, medication error  Plan  · Continue SD 1  · Neurology Following; unclear etiology  · Treating w/ cefepime for concern of occult infection  · No further hypoglycemia events however continues to require supplemental continuous D10 infusion  · D10 NS at 70  Decrease as tolerated  · C-peptide 3 8  · Hypoglycemic protocol  · Cortisol Normal  · Blood cultures negative x48 hours  Sputum culture 1+ MRSA   Will consider treating with vanco  · Continue to monitor off antibiotics  · PCT 0 08, 0 6, 1 09, 1 09  · U/A remarkable for 1+ ketone, negative for leukocytes/nitrites  · Toxicology consulted; appreciate recs  · Octreotide 50 mcgwas given empirically this admit, should cover for sulfonylurea

## 2022-03-13 NOTE — SPEECH THERAPY NOTE
Speech Language/Pathology    Speech/Language Pathology Progress Note    Patient Name: Celi Ayala  MZPEM'P Date: 3/13/2022       Subjective:  Pt seen for dysphagia tx follow up, reportedly much more awake and alert  Pt Ruby, somewhat uncooperative and refusing po trials  Pt c/o mouth being dry  Objective:  Pt noted to have wet/gurgly voice- refused to allow yankaur suctioning, would not do vol cough  Pt took 1 tsp of applesauce then stated he doesn't like applesauce, also refused van pudding  Pt consumed 4 oz of HTL by cup w/ successive sips, resistant to allow assistance for single sips  No coughing noted, voice now clear  Pt then consumed 4 oz of NTL by cup and straw, some assistance to not take consecutive sips  Pt appeared with adequate oral control/transfer  Swallows complete  Throat clearing noted x1 after successive sips of NTL, O2 sats remained stable       Assessment:  Pt w/ improved mental status, limited cooperation w/ swallow re-assessment; discussed with SEJAL Sinclair- will rec NT full liquid diet; meds as tolerated     Plan/Recommendations:  rec NT full liquids  meds as tolerated  Will cont  To follow for ongoing assessment to advance diet as able    Ramón Graves CCC-SLP  Speech Pathologist  Available via  tiger text

## 2022-03-13 NOTE — ASSESSMENT & PLAN NOTE
Lab Results   Component Value Date    HGB 7 4 (L) 03/13/2022    HGB 7 7 (L) 03/12/2022    HGB 6 7 (LL) 03/12/2022   Per CareEverywhere chart review, patient Hgb baseline 10    Plan:  · Monitor CBC

## 2022-03-13 NOTE — ASSESSMENT & PLAN NOTE
Lab Results   Component Value Date    CREATININE 1 78 (H) 03/13/2022    CREATININE 2 18 (H) 03/12/2022    CREATININE 1 99 (H) 03/12/2022     Per chart review, baseline Cr 1 5-1 6  Plan:  · Monitor Cr daily    · Monitor I/O  · Avoid hypotension, nephrotoxins  · Monitor and replete electrolytes as needed

## 2022-03-13 NOTE — ASSESSMENT & PLAN NOTE
Prostate cancer s/p seed implants in 2006  Plan  · Holding home terazosin   Will resume if able to take po

## 2022-03-14 NOTE — ASSESSMENT & PLAN NOTE
Hypotensive POA    Plan:  · Restart home norvasc 5 mg daily  · BP normotensive, continue to monitor

## 2022-03-14 NOTE — ASSESSMENT & PLAN NOTE
PMHx of prostate cancer 2006 s/p seed implants    Plan  · Restart home terazosin Right sided thoracentesis complete by Dr Navin Finley  1600ml of clear adonay fluid removed  Patient offering no complaints at this time  Bandage applied

## 2022-03-14 NOTE — ASSESSMENT & PLAN NOTE
Hypothermic, hypotensive, encephalopathic, lactic acidosis 2 3    · Started on cefepime 3/11 for concern of aspiration, now on vancomycin D2 for MRSA tracheobronchitis  · Monitor endpoints

## 2022-03-14 NOTE — TREATMENT PLAN
81y o  year old male with history of myasthenia gravis, hypertension, CAD, COPD, prostate cancer, SYED and long term NH resident was admitted with altered mental status and hypoglycemia      Both insulin antobody test and sulphonylurea screen still pending  Monitor for malnutrition as he may not have appetite  Consider megace/ small frequent simple sugars diet  Address goals of therapy and end of life decisions

## 2022-03-14 NOTE — PROGRESS NOTES
[facility]  Progress Note - Pegge Rodrigo 1935, 81 y o  male MRN: [id number]  Unit/Bed#: S -Roc Encounter: [id number]  Primary Care Provider: Muna Gutierrez MD   Date and time admitted to hospital: 3/8/2022 12:14 PM    * Encephalopathy  Assessment & Plan  71-year-old male presented to the ED via AMS from nursing home for hypoglycemia in the 30s that persisted after glucagon administration  Also having altered mental status  Patient was found hypothermic, 92 7  Pulse 30-60s  Blood pressures averaging in 90s/50s, satting >96% on RA  Patient is not on diabetes medications and neither has a history of diabetes  Blood sugars improved after D10 bolus into the 200-300s however continued blood sugar checks were hypoglycemic 22-53  POA unresponsive, was not speaking however improved and was Aaox4  DDx encephalopathy 2/2 metabolic derangements, hypothermia, infection, IVIG noncompliance for treatment of MG, medication error  Plan  · Continue SD 1  · Neurology Following; unclear etiology  · No further hypoglycemia events however continues to require supplemental continuous D10 infusion  · D5 1/2NS with KCl 20 meq at 50 ml/hr  · C-peptide 3 8  · Hypoglycemic protocol  · Thiamine 500 mg IV TID for potential Wernicke's Encephalopathy   · Cortisol Normal  · Blood cultures negative x48 hours  Sputum culture 1+ MRSA - started on vancomycin  · Toxicology consulted; appreciate recs  · Octreotide 50 mcgwas given empirically this admit, should cover for sulfonylurea    Patient's son requesting hospice consult as patient has stated he just wants to die over the past few months       Hypoglycemia  Assessment & Plan  Lab Results   Component Value Date    POCGLU 81 03/14/2022    POCGLU 124 03/14/2022    POCGLU 121 03/13/2022    POCGLU 81 03/13/2022    POCGLU 88 03/13/2022    POCGLU 88 03/13/2022    POCGLU 88 03/13/2022    POCGLU 94 03/13/2022    POCGLU 105 03/13/2022    POCGLU 112 03/12/2022    POCGLU 104 03/12/2022    POCGLU 106 03/12/2022    POCGLU 119 03/12/2022    POCGLU 90 03/12/2022     Ongoing investigation, as to etiology of hypoglycemia  Plan:  · See above for Encephalopathy   · Blood glucose stable  · Endocrinology following  · Continue D51/2 NS + K    MG (myasthenia gravis) ([facility] [address] )  Assessment & Plan  Diagnosed over 6 years ago, follows with [facility] neurology and Hematology  Last IVIG infusion documented December 2021, patient is supposed to receive treatments every 4-6 hours per chart review  Patient has an appointment with [facility] neurology on March 22, 2022 for IVIG infusion  Plan  · Neurology consulted; appreciate recs  · Completed 3 doses IVIG  · Monitor O2  · Ach-R and MUSK antibodies  · Normal cortisol     Elevated TSH  Assessment & Plan  · TSH 4 983, T4 1 21     Sepsis (HCC)  Assessment & Plan   Hypothermic, hypotensive, encephalopathic, lactic acidosis 2 3    · Started on cefepime 3/11 for concern of aspiration, now on vancomycin D2 for MRSA tracheobronchitis  · Monitor endpoints      COPD (chronic obstructive pulmonary disease) ([facility] [address] )  Assessment & Plan  Takes duonebs at home    Plan  · Xopenex and Atrovent TID  · Pulmonary toilet    Hypertension  Assessment & Plan  Hypotensive POA    Plan:  · Restart home norvasc 5 mg daily  · BP normotensive, continue to monitor      Chronic anemia  Assessment & Plan  Lab Results   Component Value Date    HGB 7 4 (L) 03/13/2022    HGB 7 7 (L) 03/12/2022    HGB 6 7 (LL) 03/12/2022   Per CareEverywhere chart review, patient Hgb baseline 10    Plan:  · Monitor CBC       SYED (obstructive sleep apnea)  Assessment & Plan  · CPAP qhs as tolerated  · Not currently using due to difficulty clearing upper airway secretions requiring frequent suctioning    Stage 3 chronic kidney disease [facility])  Assessment & Plan  Lab Results   Component Value Date    CREATININE 1 78 (H) 03/13/2022    CREATININE 1 78 (H) 03/13/2022    CREATININE 2 18 (H) 2022     Per chart review, baseline Cr 1 5-1 6  Plan:  · Monitor Cr daily  · Monitor I/O  · Avoid hypotension, nephrotoxins  · Monitor and replete electrolytes as needed    Pancreatic lesion  Assessment & Plan  Per chart review 2021 - "While inpatient at [facility] he had a CT scan abdomen was concerning for pancreatic mass  MRI noted for 5mm cystic lesion not changed from prior  Continue conservative management at this time "  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)  Assessment & Plan  Prostate cancer s/p seed implants in   Plan  · Resume home terazosin      Benign prostatic hyperplasia  Assessment & Plan  PMHx of prostate cancer 2006 s/p seed implants    Plan  · Restart home terazosin    ----------------------------------------------------------------------------------------  HPI/24hr events: Agitated requiring restraints    Patient appropriate for transfer out of the ICU today?: No  Disposition: Continue Stepdown Level 1 level of care   Code Status: Level 3 - DNAR and DNI  ---------------------------------------------------------------------------------------  SUBJECTIVE  MAGGIE    Review of Systems   Unable to perform ROS: Mental status change     Review of systems was unable to be performed secondary to mental status  ---------------------------------------------------------------------------------------  OBJECTIVE    Vitals   Vitals:    22 2100 22 2304 22 0257 22 0300   BP: 163/74 131/62 132/58 132/58   BP Location: Left arm Right arm Right arm    Pulse: 55 (!) 50 71 56   Resp: 21 17 (!) 24 22   Temp: (!) 95 1 °F (35 1 °C) (!) 96 °F (35 6 °C) (!) 96 8 °F (36 °C)    TempSrc: Rectal Axillary Axillary    SpO2: 98% 98% 95% 93%   Weight:   76 4 kg (168 lb 6 9 oz)    Height:         Temp (24hrs), Av 9 °F (35 5 °C), Min:93 3 °F (34 1 °C), Max:98 2 °F (36 8 °C)  Current: Temperature: (!) 96 8 °F (36 °C)    Respiratory:  SpO2: SpO2: 93 %, SpO2 Device: O2 Device: None (Room air)    Physical Exam  Constitutional:       Appearance: He is ill-appearing  Interventions: He is restrained  Comments: Temporal wasting   HENT:      Head: Atraumatic  Mouth/Throat:      Mouth: Mucous membranes are dry  Cardiovascular:      Rate and Rhythm: Normal rate and regular rhythm  Pulses:           Radial pulses are 2+ on the right side and 2+ on the left side  Dorsalis pedis pulses are 2+ on the right side and 2+ on the left side  Pulmonary:      Effort: Tachypnea present  No respiratory distress  Breath sounds: No wheezing, rhonchi or rales  Abdominal:      General: Bowel sounds are normal  There is no distension  Palpations: Abdomen is soft  Tenderness: There is no abdominal tenderness  Musculoskeletal:      Cervical back: Neck supple  Right lower leg: No edema  Left lower leg: No edema  Skin:     General: Skin is warm and dry  Capillary Refill: Capillary refill takes less than 2 seconds  Neurological:      GCS: GCS eye subscore is 3  GCS verbal subscore is 3  GCS motor subscore is 5        Comments: Needs a lot of prompting to answer questions and only gives short responses       Laboratory and Diagnostics:  Results from last 7 days   Lab Units 03/14/22  0712 03/13/22  0600 03/12/22  1427 03/12/22  0937 03/12/22  0519 03/11/22  0309 03/10/22  0609 03/09/22  0720 03/09/22  0720 03/08/22  2212 03/08/22  1310 03/08/22  1310   WBC Thousand/uL 5 45 5 81  --   --  6 17 5 57 7 31  --  8 61  --   --  9 79   HEMOGLOBIN g/dL 7 0* 7 4* 7 7* 6 7* 6 7* 7 6* 8 5*   < > 8 9*  --    < > 10 3*   HEMATOCRIT % 18 7* 19 9*  --   --  18 3* 20 3* 23 2*  --  25 0*  --   --  28 6*   PLATELETS Thousands/uL 186 159  --   --  138* 140* 142*  --  149 175   < > 165   NEUTROS PCT % 72 77*  --   --  85*  --   --   --  85*  --   --  83*   MONOS PCT % 11 7  --   --  5  --   --   --  7  --   --  7    < > = values in this interval not displayed  Results from last 7 days   Lab Units 03/14/22  0610 03/13/22  1708 03/13/22  0600 03/12/22  1427 03/12/22  0519 03/11/22  0309 03/10/22  0609 03/09/22  0603 03/09/22  0603 03/08/22  1310 03/08/22  1310   SODIUM mmol/L 145 143 145 144 144 138 134*   < > 142   < > 148*   POTASSIUM mmol/L 4 1 3 9 3 2* 3 7 3 4* 3 4* 3 7   < > 3 9   < > 3 4*   CHLORIDE mmol/L 117* 114* 115* 114* 114* 107 106   < > 112*   < > 115*   CO2 mmol/L 17* 20* 18* 16* 16* 19* 17*   < > 20*   < > 23   ANION GAP mmol/L 11 9 12 14* 14* 12 11   < > 10   < > 10   BUN mg/dL 24 24 23 26* 25 30* 30*   < > 36*   < > 51*   CREATININE mg/dL 1 73* 1 78* 1 78* 2 18* 1 99* 2 18* 2 23*   < > 1 70*   < > 1 66*   CALCIUM mg/dL 9 3 8 8 9 2 8 9 8 3 8 2* 8 3   < > 8 5   < > 9 2   GLUCOSE RANDOM mg/dL 74 80 73 107 115 112 88   < > 59*   < > 37*   ALT U/L 69  --  70  --   --   --   --   --  49  --  65   AST U/L 63*  --  62*  --   --   --   --   --  42  --  55*   ALK PHOS U/L 117*  --  114  --   --   --   --   --  95  --  116   ALBUMIN g/dL 1 4*  --  1 6*  --   --   --   --   --  2 0*  --  2 6*   TOTAL BILIRUBIN mg/dL 0 55  --  0 67  --   --   --   --   --  0 28  --  0 31    < > = values in this interval not displayed       Results from last 7 days   Lab Units 03/13/22  0600 03/12/22  1427 03/12/22  0519   MAGNESIUM mg/dL 1 9 1 9 1 8   PHOSPHORUS mg/dL 3 2  --   --       Results from last 7 days   Lab Units 03/13/22  0600 03/08/22  1311   INR  1 28* 1 14   PTT seconds  --  55*          Results from last 7 days   Lab Units 03/13/22  0600 03/12/22  2201 03/12/22  1427 03/12/22  0937 03/08/22  1520 03/08/22  1310   LACTIC ACID mmol/L 2 4* 2 5* 3 6* 2 3* 2 0 2 3*     ABG:    VBG:    Results from last 7 days   Lab Units 03/14/22  0610 03/11/22  0327 03/10/22  0609 03/09/22  0603 03/08/22  1311   PROCALCITONIN ng/ml 0 85* 1 09* 1 06* 0 60* 0 08       Micro  Results from last 7 days   Lab Units 03/11/22  1333 03/08/22  1316   BLOOD CULTURE   --  No Growth After 5 Days  No Growth After 5 Days  SPUTUM CULTURE  1+ Growth of Methicillin Resistant Staphylococcus aureus*  1+ Growth of   --    GRAM STAIN RESULT  2+ Epithelial cells per low power field*  4+ Polys*  1+ Gram positive rods*  1+ Gram positive cocci in pairs*  1+ Gram positive cocci in clusters*  --        Imaging: I have personally reviewed pertinent films in PACS    Intake and Output  I/O       03/12 0701 03/13 0700 03/13 0701 03/14 0700    I V  (mL/kg) 1848 8 (24) 1737 5 (22 7)    IV Piggyback 100 150    Total Intake(mL/kg) 1948 8 (25 3) 1887 5 (24 7)    Net +1948 8 +1887 5          Unmeasured Urine Occurrence 3 x 2 x    Unmeasured Stool Occurrence 0 x           Height and Weights   Height: 5' 5" (165 1 cm)  IBW (Ideal Body Weight): 61 5 kg  Body mass index is 28 03 kg/m²  Weight (last 2 days)     Date/Time Weight    03/14/22 0257 76 4 (168 43)    03/13/22 0533 77 (169 75)    03/13/22 0503 77 (169 75)    03/13/22 0321 76 6 (168 87)            Nutrition       Diet Orders   (From admission, onward)             Start     Ordered    03/13/22 1534  Diet Dysphagia/Modified Consistency; Dysphagia 1-Pureed; Nectar Thick Liquid; Full Liquid  Diet effective now        References:    Nutrtion Support Algorithm Enteral vs  Parenteral   Question Answer Comment   Diet Type Dysphagia/Modified Consistency    Dysphagia/Modified Consistency Dysphagia 1-Pureed    Liquid Modifier Nectar Thick Liquid    Other Restriction(s): Full Liquid    RD to adjust diet per protocol?  Yes        03/13/22 1539    03/08/22 1830  Room Service  Once        Question:  Type of Service  Answer:  Room Service - Appropriate with Assistance    03/08/22 1829                  Active Medications  Scheduled Meds:  Current Facility-Administered Medications   Medication Dose Route Frequency Provider Last Rate    acetaminophen  650 mg Oral Q6H [address] Sonja Miller PA-C  dextrose 5 % and sodium chloride 0 45 % with KCl 20 mEq/L  50 mL/hr Intravenous Continuous [facility], PA-C 50 mL/hr (03/13/22 1831)    heparin (porcine)  5,000 Units Subcutaneous Q8H [address] Smitha Canales MD      ipratropium  0 5 mg Nebulization TID PRN Yariel Aclazar MD      levalbuterol  1 25 mg Nebulization TID PRN Yariel Alcazar MD      lidocaine  1 patch Topical Daily [facility], [place]      pantoprazole  40 mg Intravenous Q12H [address] Smitha Canales MD      potassium chloride  40 mEq Oral Once [facility], [place]      sodium chloride (PF)  3 mL Intravenous Q1H PRN Smitha Canales MD     Shoshana Riis thiamine  500 mg Intravenous TID Yariel Alcazar  mg (03/14/22 0746)    vancomycin  12 5 mg/kg Intravenous Q24H Abhay Flores MD       Continuous Infusions:  dextrose 5 % and sodium chloride 0 45 % with KCl 20 mEq/L, 50 mL/hr, Last Rate: 50 mL/hr (03/13/22 1831)      PRN Meds:   ipratropium, 0 5 mg, TID PRN  levalbuterol, 1 25 mg, TID PRN  sodium chloride (PF), 3 mL, Q1H PRN        Invasive Devices Review  Invasive Devices  Report    Peripheral Intravenous Line            Peripheral IV 03/13/22 Left;Upper;Ventral (anterior) Arm <1 day                Rationale for remaining devices: IV access  ---------------------------------------------------------------------------------------  Advance Directive and Living Will:      Power of :    POLST:    ---------------------------------------------------------------------------------------  Care Time Delivered:   No Critical Care time spent       Lee Mena PA-C      Portions of the record may have been created with voice recognition software  Occasional wrong word or "sound a like" substitutions may have occurred due to the inherent limitations of voice recognition software    Read the chart carefully and recognize, using context, where substitutions have occurred

## 2022-03-14 NOTE — PROGRESS NOTES
Discussed with patient's son, Michaela Lennox, at bedside transition to hospice care  Hospice liaison states it may take a day or two for him to return to has facility  Michaela Lennox okay with transitioning to comfort care while he is in the hospital with understanding that he may pass away prior to transfer back to [facility]  Will place comfort care order and discontinue treatment focused care      Shine Fortune PA-C

## 2022-03-14 NOTE — HOSPICE NOTE
Hospice referral received  Son had actually called inquiring about hospice services for his father  Came and met him at bedside and reviewed hospice care and services  Son is in agreement with hospice care for his father back at the facility  Pt [place] coverage waiting for approval from the [place] before we can proceed  Pt is approved for RLOC by Dr Orta Shells

## 2022-03-14 NOTE — CASE MANAGEMENT
Case Management Discharge Planning Note    Patient name Bridgette Wallace  Location S /S -01 MRN [id number]  : 1935 Date 3/14/2022       Current Admission Date: 3/8/2022  Current Admission Diagnosis:Encephalopathy   Patient Active Problem List    Diagnosis Date Noted    Encephalopathy 2022    Hypoglycemia 2022    Elevated TSH 2022    SYED (obstructive sleep apnea)     Chronic anemia     Hypertension     COPD [facility] disease) ([facility] [address] [facility]     Sepsis ([facility] [address] )     Intravenous infiltration 2022    Frailty syndrome in geriatric patient 2022    Hearing loss 2021    Antral ulcer 2021    Pancreatic lesion 2021    Ambulatory dysfunction 2021    MG (myasthenia gravis) ([facility] [address] ) 2021    Stage 3 chronic kidney disease ([facility] [address] ) 2021    Degeneration of thoracic intervertebral disc 2016    Malignant neoplasm of prostate ([facility] [address] ) 2004    Benign prostatic hyperplasia 2002      LOS (days): 6  Geometric Mean LOS (GMLOS) (days): 4 80  Days to GMLOS:-1 1     OBJECTIVE:  Risk of Unplanned Readmission Score: 21         Current admission status: Inpatient   Preferred Pharmacy:   [address] [place] [address],  [address] [address]  Phone: [phone] Fax: [phone]    Primary Care Provider: Muna Gutierrez MD    Primary Insurance: [address] [place] [address],[address]  Secondary Insurance: [facility] HOSPITAL REP    DISCHARGE DETAILS:    Discharge planning discussed with[de-identified] patient's son  Freedom of Choice: Yes  Comments - Freedom of Choice: Son is in agreement with referralto  hospice for care back at [facility]    CM contacted family/caregiver?: Yes  Were Treatment Team discharge recommendations reviewed with patient/caregiver?: Yes  Did patient/caregiver verbalize understanding of patient care needs?: Yes  Were patient/caregiver advised of the risks associated with not following Treatment Team discharge recommendations?: Yes    Contacts  Patient Contacts: Jonathon Due  Relationship to Patient[de-identified] Family  Contact Method: Phone  Phone Number: [phone]  Reason/Outcome: Discharge [address]         Is the patient interested in [facility] at discharge?: No    DME Referral Provided  Referral made for DME?: No    Other Referral/Resources/Interventions Provided:  Interventions: SNF  Referral Comments: Patient's son would liek to move forward with hospice care  Referral to  hospice and then [facility] for placement  Cm awaiting confirmation if authorization is required for hospice placement through the [place]

## 2022-03-14 NOTE — PROGRESS NOTES
Vancomycin IV Pharmacy-to-Dose Consultation    Giana Borrero is a 81 y o  male who is currently receiving Vancomycin IV with management by the Pharmacy Consult service  Assessment/Plan:  The patient was reviewed  Renal function is stable and no signs or symptoms of nephrotoxicity and/or infusion reactions were documented in the chart  Based on todays assessment, continue current vancomycin dosing of 1000 mg IV q24h  with a plan for trough to be drawn at 1300 on 03/16  We will continue to follow the patients culture results and clinical progress daily      Farnaz Vegas, Pharmacist

## 2022-03-14 NOTE — ASSESSMENT & PLAN NOTE
Lab Results   Component Value Date    CREATININE 1 78 (H) 03/13/2022    CREATININE 1 78 (H) 03/13/2022    CREATININE 2 18 (H) 03/12/2022     Per chart review, baseline Cr 1 5-1 6  Plan:  · Monitor Cr daily    · Monitor I/O  · Avoid hypotension, nephrotoxins  · Monitor and replete electrolytes as needed

## 2022-03-14 NOTE — ASSESSMENT & PLAN NOTE
Diagnosed over 6 years ago, follows with [facility] neurology and Hematology  Last IVIG infusion documented December 2021, patient is supposed to receive treatments every 4-6 hours per chart review  Patient has an appointment with [facility] neurology on March 22, 2022 for IVIG infusion      Plan  · Neurology consulted; appreciate recs  · Completed 3 doses IVIG  · Monitor O2  · Ach-R and MUSK antibodies  · Normal cortisol

## 2022-03-14 NOTE — CASE MANAGEMENT
Case Management Assessment    Patient name Smith Stapleton  Location S /S -01 MRN [id number]  : 1935 Date 3/14/2022       Current Admission Date: 3/8/2022  Current Admission Diagnosis:Encephalopathy   Patient Active Problem List    Diagnosis Date Noted    Encephalopathy 2022    Hypoglycemia 2022    Elevated TSH 2022    SYED (obstructive sleep apnea)     Chronic anemia     Hypertension     [facility] disease) [facility] )     Sepsis ([facility] )     Intravenous infiltration 2022    Frailty syndrome in geriatric patient 2022    Hearing loss 2021    Antral ulcer 2021    Pancreatic lesion 2021    Ambulatory dysfunction 2021    MG (myasthenia gravis) ([facility] ) 2021    Stage 3 chronic [facility] disease ([facility] ) 2021    Degeneration of thoracic intervertebral disc 2016    Malignant neoplasm of prostate ([facility] ) 2004    Benign prostatic hyperplasia 2002      LOS (days): 6  Geometric Mean LOS (GMLOS) (days): 4 80  Days to GMLOS:-1 1     OBJECTIVE:    Risk of Unplanned Readmission Score: 21         Current admission status: Inpatient       Preferred Pharmacy:   [facility]/pharmacy #[id number],  [place] [address]  Phone: [phone] Fax: [phone]    Primary Care Provider: Pankaj Jain MD    Primary Insurance: [id number] [facility]  Secondary Insurance: [facility] REP    ASSESSMENT:  Karena 26 Proxies    There are no active Health Care Proxies on file  Patient Information  Admitted from[de-identified] Facility  Mental Status: Confused  During Assessment patient was accompanied by:  Son  Assessment information provided by[de-identified] Son  Support Systems: Son  Type of Current Residence: [facility] ([facility])  In the last 12 months, was there a time when you were not able to pay the mortgage or rent on time?: No  In the last 12 months, was there a time when you did not have a steady place to sleep or slept in a shelter (including now)?: No  Homeless/housing insecurity resource given?: N/A  Living Arrangements: Other (Comment) (resides at [facility])    Activities of Daily Living Prior to Admission  Functional Status: Assistance  Completes ADLs independently?: No  Level of ADL dependence: Assistance  Ambulates independently?: No  Level of ambulatory dependence: Assistance  Does patient use assisted devices?: No  Does patient currently own DME?: No  Does patient have a history of Outpatient Therapy (PT/OT)?: No  Does the patient have a history of Short-Term Rehab?: Yes ([facility])  Does patient have a history of HHC?: No  [facility] have [facility]?: No         Patient Information Continued  Income Source: Pension/[employer]  Does patient have prescription coverage?: No  Within the past 12 months, you worried that your food would run out before you got the money to buy more : Never true  Within the past 12 months, the food you bought just didnt last and you didnt have money to get more : Never true  Food insecurity resource given?: N/A  Does patient receive dialysis treatments?: No  Does patient have a history of substance abuse?: No    PHQ 2/9 Screening   Reviewed PHQ 2/9 Depression Screening Score?: No    Means of Transportation  Means of Transport to Appts[de-identified] Family transport  In the past 12 months, has lack of transportation kept you from medical appointments or from getting medications?: No  In the past 12 months, has lack of transportation kept you from meetings, work, or from getting things needed for daily living?: No  Was application for public transport provided?: N/A

## 2022-03-14 NOTE — ASSESSMENT & PLAN NOTE
Lab Results   Component Value Date    POCGLU 81 03/14/2022    POCGLU 124 03/14/2022    POCGLU 121 03/13/2022    POCGLU 81 03/13/2022    POCGLU 88 03/13/2022    POCGLU 88 03/13/2022    POCGLU 88 03/13/2022    POCGLU 94 03/13/2022    POCGLU 105 03/13/2022    POCGLU 112 03/12/2022    POCGLU 104 03/12/2022    POCGLU 106 03/12/2022    POCGLU 119 03/12/2022    POCGLU 90 03/12/2022     Ongoing investigation, as to etiology of hypoglycemia      Plan:  · See above for Encephalopathy   · Blood glucose stable  · Endocrinology following  · Continue D51/2 NS + K

## 2022-03-14 NOTE — ASSESSMENT & PLAN NOTE
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

## 2022-03-14 NOTE — SPEECH THERAPY NOTE
Speech Language/Pathology    Speech/Language Pathology Progress Note    Patient Name: Khari De Los Santos  UJORC'X Date: 3/14/2022       Subjective:  Patient seen for dysphagia therapy at breakfast   Patient was started on nectar thick full liquid diet  Per nursing patient was lethargic this morning    Objective:  Patient was easily awakened and seated upright for breakfast   Patient noted to have wet voice prior to p o , patient took sips of nectar thick by cup with mild labial leakage noted on the left  Patient also took sips by straw but appeared to have more difficulty than yesterday  Oral control and transfer were good  Patient took 2 tsp of cream of wheat and 1 of chocolate pudding then refused further p o  Small initiation was adequate but complete  Delayed cough noted x1 after nectar thick by cup  Vocal quality continued to be wet  Assessment:  Patient continues with poor p o  Intake, refusing to take p o  Patient with questionable aspiration risk but with limited cooperation for assessment    Plan/Recommendations:  Continue with nectar thick full liquids for now    Will continue to follow    Ramón Graves CCC-SLP  Speech Pathologist  Available via  tiger text

## 2022-03-14 NOTE — ASSESSMENT & PLAN NOTE
81-year-old male presented to the ED via AMS from nursing home for hypoglycemia in the 30s that persisted after glucagon administration  Also having altered mental status  Patient was found hypothermic, 92 7  Pulse 30-60s  Blood pressures averaging in 90s/50s, satting >96% on RA  Patient is not on diabetes medications and neither has a history of diabetes  Blood sugars improved after D10 bolus into the 200-300s however continued blood sugar checks were hypoglycemic 22-53  POA unresponsive, was not speaking however improved and was Aaox4  DDx encephalopathy 2/2 metabolic derangements, hypothermia, infection, IVIG noncompliance for treatment of MG, medication error  Plan  · Continue SD 1  · Neurology Following; unclear etiology  · No further hypoglycemia events however continues to require supplemental continuous D10 infusion  · D5 1/2NS with KCl 20 meq at 50 ml/hr  · C-peptide 3 8  · Hypoglycemic protocol  · Thiamine 500 mg IV TID for potential Wernicke's Encephalopathy   · Cortisol Normal  · Blood cultures negative x48 hours  Sputum culture 1+ MRSA - started on vancomycin  · Toxicology consulted; appreciate recs  · Octreotide 50 mcgwas given empirically this admit, should cover for sulfonylurea    Patient's son requesting hospice consult as patient has stated he just wants to die over the past few months

## 2022-03-15 NOTE — ASSESSMENT & PLAN NOTE
73-year-old male presented to the ED via AMS from nursing home for hypoglycemia in the 30s that persisted after glucagon administration  Also having altered mental status  Patient was found hypothermic, 92 7  Pulse 30-60s  Blood pressures averaging in 90s/50s, satting >96% on RA  Patient is not on diabetes medications and neither has a history of diabetes  Blood sugars improved after D10 bolus into the 200-300s however continued blood sugar checks were hypoglycemic 22-53  POA unresponsive, was not speaking however improved and was Aaox4  DDx encephalopathy 2/2 metabolic derangements, hypothermia, infection, IVIG noncompliance for treatment of MG, medication error  Unable to determine clear etiology  Possibly prolonged hypoglycemia  Discussions held with patient's son who requested hospice consult as patient has stated he just wants to die over the past few months  Patient transitioned to comfort care 3/14  Awaiting insurance clearance to return to [address] [place] [address]      · PRN morphine and haldol

## 2022-03-15 NOTE — CASE MANAGEMENT
Case Management Progress Note    Patient name Idalia Treviño  Location S /S -01 MRN [id number]  : 1935 Date 3/15/2022       LOS (days): 7  Geometric Mean LOS (GMLOS) (days): 4 80  Days to GMLOS:-1 9        OBJECTIVE:        Current admission status: Inpatient  Preferred Pharmacy:   [id number], [address] [address] [address]  Phone: [phone] Fax: [phone]    Primary Care Provider: Aamir Huitron MD    Primary Insurance: [id number] [facility]  Secondary Insurance: [employer]  [address] REP    PROGRESS NOTE:  Cm continues to work on [facility]  Cm confirmed that patient is on comfort care measures  CM confirmed that patient's son is signing onto hospice with [facility] hospice  Cm left a voice message with patient's [facility] PCP to obtain authorization for hospice services  Cm placed another phone call to the [facility] this AM to requesting update on authorization  Cm again only able to leave message from [facility] PCP again  Cm awaiting return phone call  Cm informed by Rianna Sanchez, hospice social worker with the [facility], that after review by [facility] PCP, they are also in agreement with hospice referral  Cm informed that [facility] to work with another department to finalize and transportation department  Hoping for dc tomorrow  Anticipated start of hospice services on Thursday  Anticipated dc on Wednesday

## 2022-03-15 NOTE — HOSPICE NOTE
Was informed that the [place] has given authorization for [facility]  Will get consents   Pt to be opened to hospice care at [facility] on Thursday

## 2022-03-15 NOTE — PROGRESS NOTES
[facility]  Progress Note - Rosalino Leyva 1935, 81 y o  male MRN: [id number]  Unit/Bed#: S -Roc Encounter: [id number]  Primary Care Provider: Larry Welch MD   Date and time admitted to hospital: 3/8/2022 12:14 PM    * Encephalopathy  Assessment & Plan  50-year-old male presented to the ED via AMS from nursing home for hypoglycemia in the 30s that persisted after glucagon administration  Also having altered mental status  Patient was found hypothermic, 92 7  Pulse 30-60s  Blood pressures averaging in 90s/50s, satting >96% on RA  Patient is not on diabetes medications and neither has a history of diabetes  Blood sugars improved after D10 bolus into the 200-300s however continued blood sugar checks were hypoglycemic 22-53  POA unresponsive, was not speaking however improved and was Aaox4  DDx encephalopathy 2/2 metabolic derangements, hypothermia, infection, IVIG noncompliance for treatment of MG, medication error  Unable to determine clear etiology  Possibly prolonged hypoglycemia  Discussions held with patient's son who requested hospice consult as patient has stated he just wants to die over the past few months  Patient transitioned to comfort care 3/14  Awaiting insurance clearance to return to [address] [place] [address] [address]  · PRN morphine and haldol      ----------------------------------------------------------------------------------------  HPI/24hr events: Discussions held with patient's son - decision made to transition to comfort care      Disposition: Transfer to Hospice  Code Status: Level 4 - Comfort Care  ---------------------------------------------------------------------------------------  SUBJECTIVE  MAGGIE    Review of Systems   Unable to perform ROS: Mental status change     ---------------------------------------------------------------------------------------  OBJECTIVE    Vitals   Vitals:    03/14/22 0257 03/14/22 0300 03/14/22 0735 03/14/22 1011   BP: 132/58 132/58 129/59    BP Location: Right arm      Pulse: 71 56 74    Resp: (!) 24 22 (!) 30    Temp: (!) 96 8 °F (36 °C)  100 °F (37 8 °C) 99 1 °F (37 3 °C)   TempSrc: Axillary  Axillary    SpO2: 95% 93%     Weight: 76 4 kg (168 lb 6 9 oz)      Height:         Temp (24hrs), Av 6 °F (37 6 °C), Min:99 1 °F (37 3 °C), Max:100 °F (37 8 °C)  Current: Temperature: 99 1 °F (37 3 °C)    Respiratory:  SpO2: SpO2: 93 %, SpO2 Device: O2 Device: None (Room air)    Physical Exam  Constitutional:       General: He is not in acute distress  Appearance: He is ill-appearing  HENT:      Mouth/Throat:      Mouth: Mucous membranes are dry  Cardiovascular:      Rate and Rhythm: Normal rate and regular rhythm  Pulmonary:      Effort: No respiratory distress  Breath sounds: No wheezing, rhonchi or rales  Musculoskeletal:      Right lower leg: No edema  Left lower leg: No edema  Skin:     General: Skin is warm and dry  Neurological:      Comments: Eyes open  Moving all extremities spontaneously  Does not follow commands or verbalize       Laboratory and Diagnostics:  Results from last 7 days   Lab Units 22  0712 22  0600 22  1427 22  0937 22  0519 22  0309 03/10/22  0609 22  0720 22  0720 22  2212 22  1310 22  1310   WBC Thousand/uL 5 45 5 81  --   --  6 17 5 57 7 31  --  8 61  --   --  9 79   HEMOGLOBIN g/dL 7 0* 7 4* 7 7* 6 7* 6 7* 7 6* 8 5*   < > 8 9*  --    < > 10 3*   HEMATOCRIT % 18 7* 19 9*  --   --  18 3* 20 3* 23 2*  --  25 0*  --   --  28 6*   PLATELETS Thousands/uL 186 159  --   --  138* 140* 142*  --  149 175   < > 165   NEUTROS PCT % 72 77*  --   --  85*  --   --   --  85*  --   --  83*   MONOS PCT % 11 7  --   --  5  --   --   --  7  --   --  7    < > = values in this interval not displayed       Results from last 7 days   Lab Units 22  0610 22  1708 22  0600 22  1427 22  0519 22  0309 03/10/22  4025 03/09/22  0603 03/09/22  0603 03/08/22  1310 03/08/22  1310   SODIUM mmol/L 145 143 145 144 144 138 134*   < > 142   < > 148*   POTASSIUM mmol/L 4 1 3 9 3 2* 3 7 3 4* 3 4* 3 7   < > 3 9   < > 3 4*   CHLORIDE mmol/L 117* 114* 115* 114* 114* 107 106   < > 112*   < > 115*   CO2 mmol/L 17* 20* 18* 16* 16* 19* 17*   < > 20*   < > 23   ANION GAP mmol/L 11 9 12 14* 14* 12 11   < > 10   < > 10   BUN mg/dL 24 24 23 26* 25 30* 30*   < > 36*   < > 51*   CREATININE mg/dL 1 73* 1 78* 1 78* 2 18* 1 99* 2 18* 2 23*   < > 1 70*   < > 1 66*   CALCIUM mg/dL 9 3 8 8 9 2 8 9 8 3 8 2* 8 3   < > 8 5   < > 9 2   GLUCOSE RANDOM mg/dL 74 80 73 107 115 112 88   < > 59*   < > 37*   ALT U/L 69  --  70  --   --   --   --   --  49  --  65   AST U/L 63*  --  62*  --   --   --   --   --  42  --  55*   ALK PHOS U/L 117*  --  114  --   --   --   --   --  95  --  116   ALBUMIN g/dL 1 4*  --  1 6*  --   --   --   --   --  2 0*  --  2 6*   TOTAL BILIRUBIN mg/dL 0 55  --  0 67  --   --   --   --   --  0 28  --  0 31    < > = values in this interval not displayed  Results from last 7 days   Lab Units 03/13/22  0600 03/12/22  1427 03/12/22  0519   MAGNESIUM mg/dL 1 9 1 9 1 8   PHOSPHORUS mg/dL 3 2  --   --       Results from last 7 days   Lab Units 03/13/22  0600 03/08/22  1311   INR  1 28* 1 14   PTT seconds  --  55*          Results from last 7 days   Lab Units 03/13/22  0600 03/12/22  2201 03/12/22  1427 03/12/22  0937 03/08/22  1520 03/08/22  1310   LACTIC ACID mmol/L 2 4* 2 5* 3 6* 2 3* 2 0 2 3*     ABG:    VBG:    Results from last 7 days   Lab Units 03/14/22  0610 03/11/22  0327 03/10/22  0609 03/09/22  0603 03/08/22  1311   PROCALCITONIN ng/ml 0 85* 1 09* 1 06* 0 60* 0 08       Micro  Results from last 7 days   Lab Units 03/11/22  1333 03/08/22  1316   BLOOD CULTURE   --  No Growth After 5 Days  No Growth After 5 Days     SPUTUM CULTURE  1+ Growth of Methicillin Resistant Staphylococcus aureus*  1+ Growth of   --    GRAM STAIN RESULT  2+ Epithelial cells per low power field*  4+ Polys*  1+ Gram positive rods*  1+ Gram positive cocci in pairs*  1+ Gram positive cocci in clusters*  --      Intake and Output  I/O       03/13 0701 03/14 0700 03/14 0701  03/15 0700    I V  (mL/kg) 1737 5 (22 7)     IV Piggyback 150     Total Intake(mL/kg) 1887 5 (24 7)     Net +1887 5           Unmeasured Urine Occurrence 2 x 2 x    Unmeasured Stool Occurrence  1 x          Height and Weights   Height: 5' 5" (165 1 cm)  IBW (Ideal Body Weight): 61 5 kg  Body mass index is 28 03 kg/m²  Weight (last 2 days)     Date/Time Weight    03/14/22 0257 76 4 (168 43)    03/13/22 0533 77 (169 75)    03/13/22 0503 77 (169 75)    03/13/22 0321 76 6 (168 87)            Nutrition       Diet Orders   (From admission, onward)             Start     Ordered    03/14/22 1351  Diet Regular; Pleasure Feed  Diet effective now        References:    Nutrtion Support Algorithm Enteral vs  Parenteral   Question Answer Comment   Diet Type Regular    Regular Pleasure Feed    RD to adjust diet per protocol?  No        03/14/22 1351    03/08/22 1830  Room Service  Once        Question:  Type of Service  Answer:  Room Service - Appropriate with Assistance    03/08/22 1829                  Active Medications  Scheduled Meds:  Current Facility-Administered Medications   Medication Dose Route Frequency Provider Last Rate    glycopyrrolate  0 1 mg Intravenous Q4H PRN Johnie Fothergill, PA-C      haloperidol lactate  0 5 mg Intravenous Q2H PRN Johnie Fothergill, PA-C      ipratropium  0 5 mg Nebulization TID PRN Nay Larsen MD      levalbuterol  1 25 mg Nebulization TID PRN Nay Larsen MD      lidocaine  1 patch Topical Daily Chely Ignacio PA-C      morphine injection  2 mg Intravenous Q1H PRN Johnie Fothergill, PA-C      sodium chloride (PF)  3 mL Intravenous Q1H PRN Cookie Forrester MD       Continuous Infusions:     PRN Meds:   glycopyrrolate, 0 1 mg, Q4H PRN  haloperidol lactate, 0 5 mg, Q2H PRN  ipratropium, 0 5 mg, TID PRN  levalbuterol, 1 25 mg, TID PRN  morphine injection, 2 mg, Q1H PRN  sodium chloride (PF), 3 mL, Q1H PRN        Invasive Devices Review  Invasive Devices  Report    Peripheral Intravenous Line            Peripheral IV 03/14/22 Left Forearm <1 day                Rationale for remaining devices: IV access  ---------------------------------------------------------------------------------------  Advance Directive and Living Will:      Power of :    POLST:    ---------------------------------------------------------------------------------------  Care Time Delivered:   No Critical Care time spent       Ashley Lee PA-C      Portions of the record may have been created with voice recognition software  Occasional wrong word or "sound a like" substitutions may have occurred due to the inherent limitations of voice recognition software    Read the chart carefully and recognize, using context, where substitutions have occurred

## 2022-03-16 NOTE — CASE MANAGEMENT
Case Management Discharge Planning Note    Patient name Melinda Howard  Location S /S -01 MRN [id number]  : 1935 Date 3/16/2022       Current Admission Date: 3/8/2022  Current Admission Diagnosis:Encephalopathy   Patient Active Problem List    Diagnosis Date Noted    Encephalopathy 2022    Hypoglycemia 2022    Elevated TSH 2022    SYED (obstructive sleep apnea)     Chronic anemia     Hypertension     [facility]     Sepsis [facility]     Intravenous infiltration 2022    Frailty syndrome in geriatric patient 2022    Hearing loss 2021    Antral ulcer 2021    Pancreatic lesion 2021    Ambulatory dysfunction 2021    MG (myasthenia gravis) ([facility] ) 2021    Stage 3 chronic kidney disease ([facility] ) 2021    Degeneration of thoracic intervertebral disc 2016    [facility] 2004    Benign prostatic hyperplasia 2002      LOS (days): 8  Geometric Mean LOS (GMLOS) (days): 4 80  Days to GMLOS:-3 1     OBJECTIVE:  Risk of Unplanned Readmission Score: 19         Current admission status: Inpatient   Preferred Pharmacy:   [address] [place] [address],  [address] [address]  Phone: [phone] Fax: [phone]    Primary Care Provider: Jasmin Levine MD    Primary Insurance: [address] [place] [address],[address]  Secondary Insurance: [facility] REP    DISCHARGE DETAILS:    Discharge planning discussed with[de-identified] patient's son  Freedom of Choice: Yes     CM contacted family/caregiver?: Yes  Were Treatment Team discharge recommendations reviewed with patient/caregiver?: Yes  Did patient/caregiver verbalize understanding of patient care needs?: Yes  Were patient/caregiver advised of the risks associated with not following Treatment Team discharge recommendations?: Yes    Contacts  Patient Contacts: Mali Linton  Relationship to Patient[de-identified] Family  Contact Method: Phone  Phone Number: [phone]  Reason/Outcome: Discharge [address]         Is the patient interested in [facility] at discharge?: No    DME Referral Provided  Referral made for DME?: No    Other Referral/Resources/Interventions Provided:  Interventions: Hospice  Referral Comments: Cm was able to confirm bed at [facility]  Cm was able to confirm VA authorization after removal of posey this AM  Per [facility], they will accept today as long as patient's behaviors can be controlled by medications  Cm spoke wt MD about medications orders  Cm obtained VA approval, informed son and medical team about dc today  Cm requesting transportation for today  Treatment Team Recommendation: Hospice  Discharge Destination Plan[de-identified] Hospice  Transport at Discharge : BLS Ambulance     Patient to be transported to [facility] at [address] by Jae, patient's son, and medical team aware of transport time for patient  Facility also aware of transport and request by son to be contacted once patient is settled at rehab unit

## 2022-03-16 NOTE — DISCHARGE SUMMARY
[facility]  Discharge- Giana Borrero 1935, 81 y o  male MRN: [id number]  Unit/Bed#: S -01 Encounter: [id number]  Primary Care Provider: Chapito San MD   Date and time admitted to hospital: 3/8/2022 12:14 PM    * Encephalopathy  Assessment & Plan  POA, from nursing home for hypoglycemia in the 30s that persisted after glucagon administration  Also having altered mental status  Patient was found hypothermic, 92 7  Pulse 30-60s  Blood pressures averaging in 90s/50s, satting >96% on RA  Patient is not on diabetes medications and neither has a history of diabetes  Blood sugars improved after D10 bolus into the 200-300s however continued blood sugar checks were hypoglycemic 22-53  POA unresponsive, was not speaking however improved and was Aaox4  DDx encephalopathy 2/2 metabolic derangements, hypothermia, infection, IVIG noncompliance for treatment of MG, medication error       Unable to determine clear etiology  Possibly prolonged hypoglycemia  Discussions held with patient's son who requested hospice consult as patient has stated he just wants to die over the past few months  Patient transitioned to comfort care 3/14  Awaiting insurance clearance to return to [facility]      Patient was under the care of ICU/Critical care, now under medsurge       Plan  Hospice consulted, appreciate recommendations   Case management -  DC to [facility] in [place] today  PRN morphine   [address] haldol - for agitation     Discharging Resident: Francisco Gonzalez MD  Attending: Jemal Zimmer MD  PCP: Chapito San MD  Admission Date: 3/8/2022  Discharge Date: 03/16/22    Disposition:      [address] at [facility] for hospice care    For Discharges to [facility] SNF:   · Not Applicable to this Patient - Not Applicable to this Patient    Reason for Admission:  Hypoglycemia presenting as with [address] Stay:  · Toxicology  · Neurology  · Case management  · Endocrinology  · Hospice     Procedures Performed:     · None    Medication Adjustments and Discharge Medications:  · Summary of Medication Adjustments made as a result of this hospitalization:  Patient comfort care, discontinued off of treatment focus care  · Medication Dosing Tapers - Please refer to Discharge Medication List for details on any medication dosing tapers (if applicable to patient)  · Medications being temporarily held (include recommended restart time):   · Discharge Medication List: See after visit summary for reconciled discharge medications  Wound Care Recommendations:  When applicable, please see wound care section of After Visit Summary  Diet Recommendations at Discharge:  Diet -        Diet Orders   (From admission, onward)             Start     Ordered    03/14/22 1351  Diet Regular; Pleasure Feed  Diet effective now        References:    Nutrtion Support Algorithm Enteral vs  Parenteral   Question Answer Comment   Diet Type Regular    Regular Pleasure Feed    RD to adjust diet per protocol? No        03/14/22 1351    03/08/22 1830  Room Service  Once        Question:  Type of Service  Answer:  Room Service - Appropriate with Assistance    03/08/22 1829              Fluid Restriction - No Fluid Restriction at Discharge  Instructions for any Catheters / Lines Present at Discharge (including removal date, if applicable):  None    Significant Findings / Test Results:     CT head without contrast    Result Date: 3/8/2022  Impression: No acute intracranial abnormality  Workstation performed: [id number]     CT chest abdomen pelvis w contrast    Result Date: 3/8/2022  Impression: Small bibasilar pleural effusions with cardiomegaly  13 mm right upper lobe groundglass opacity #3/29  Follow-up with repeat CT chest in one year  No acute findings in the abdomen or the pelvis  The study was marked in [facility] for immediate notification  Workstation performed: [id number]       · No Chest XR results available for this patient  ·     Incidental Findings:   · None     Test Results Pending at Discharge (will require follow up): · None     Outpatient Tests Requested:  · None    Complications:  None    Hospital Course:     Travis Puga is a 81 y o  male patient who originally presented to the hospital on 3/8/2022 via AMS from nursing home for hypoglycemia in the 30s that persisted after glucagon administration  Also having altered mental status  Patient was found hypothermic, 92 7  Pulse 30-60s  Blood pressures averaging in 90s/50s, satting >96% on RA  Patient is not on diabetes medications and neither has a history of diabetes  Blood sugars improved after D10 bolus into the 200-300s however continued blood sugar checks were hypoglycemic 22-53  POA unresponsive, was not speaking however improved and was Aaox4  DDx encephalopathy 2/2 metabolic derangements, hypothermia, infection, IVIG noncompliance for treatment of MG, medication error       Unable to determine clear etiology  Possibly prolonged hypoglycemia  Discussions held with patient's son who requested hospice consult as patient has stated he just wants to die over the past few months  Patient transitioned to comfort care 3/14  Disposition is discharge to rehab facility for hospice care  Condition at Discharge: stable     Discharge Day Visit / Exam:     Subjective:  No overnight events as reported by nursing team   Patient nonverbal, unable to assess  Vitals: Blood Pressure: 129/59 (03/14/22 0735)  Pulse: 74 (03/14/22 0735)  Temperature: 99 1 °F (37 3 °C) (03/14/22 1011)  Temp Source: Axillary (03/14/22 0735)  Respirations: (!) 30 (03/14/22 0735)  Height: 5' 5" (165 1 cm) (03/08/22 1220)  Weight - Scale: 76 4 kg (168 lb 6 9 oz) (03/14/22 0257)  SpO2: 93 % (03/14/22 0300)  Exam:   Physical Exam  Vitals and nursing note reviewed  Constitutional:       Appearance: He is overweight  He is ill-appearing  He is not diaphoretic  HENT:      Head: Normocephalic and atraumatic  Mouth/Throat:      Mouth: Mucous membranes are dry  Pharynx: Oropharynx is clear  Eyes:      General: No scleral icterus  Conjunctiva/sclera: Conjunctivae normal    Cardiovascular:      Rate and Rhythm: Normal rate and regular rhythm  Pulses: Normal pulses  Heart sounds: Normal heart sounds  No murmur heard  No gallop  Pulmonary:      Effort: Pulmonary effort is normal  No respiratory distress  Breath sounds: Normal breath sounds  No wheezing or rales  Abdominal:      General: Bowel sounds are normal  There is no distension  Palpations: Abdomen is soft  Musculoskeletal:      Cervical back: Neck supple  Right lower leg: No edema  Left lower leg: No edema  Skin:     General: Skin is warm and dry  Capillary Refill: Capillary refill takes 2 to 3 seconds  Coloration: Skin is not jaundiced or pale  Neurological:      Mental Status: He is alert  Comments: Not following commands  Psychiatric:      Comments: Nonverbal          Discussion with Family:  Updated son Stewart Vidal via phone    Goals of Care Discussions:  · Code Status at Discharge: Level 4 - Comfort Care  · Were there any Goals of Care Discussions during Hospitalization?: Yes  · Results of any General Goals of Care Discussions:  Hospice care   · POLST Completed: No   · If POLST Completed, Summary of POLST Agreement Provided Here:    · OK to Rehospitalize if Needed? No    Discharge instructions/Information to patient and family:   See after visit summary section titled Discharge Instructions for information provided to patient and family        Planned Readmission:  None      ** Please Note: This note has been constructed using a voice recognition system **

## 2022-03-16 NOTE — ASSESSMENT & PLAN NOTE
POA, from nursing home for hypoglycemia in the 30s that persisted after glucagon administration  Also having altered mental status  Patient was found hypothermic, 92 7  Pulse 30-60s  Blood pressures averaging in 90s/50s, satting >96% on RA  Patient is not on diabetes medications and neither has a history of diabetes  Blood sugars improved after D10 bolus into the 200-300s however continued blood sugar checks were hypoglycemic 22-53  POA unresponsive, was not speaking however improved and was Aaox4  DDx encephalopathy 2/2 metabolic derangements, hypothermia, infection, IVIG noncompliance for treatment of MG, medication error       Unable to determine clear etiology  Possibly prolonged hypoglycemia  Discussions held with patient's son who requested hospice consult as patient has stated he just wants to die over the past few months  Patient transitioned to comfort care 3/14  Awaiting insurance clearance to return to [facility] [address]      Patient was under the care of ICU/Critical care, now under medsurge       Plan  Hospice consulted, appreciate recommendations   Case management -  DC to [address] in [place] today  PRN morphine   [facility]

## 2022-03-16 NOTE — TREATMENT PLAN
Pt is being evaluated by hospice team   Will sign off and remove from census  Please consult as needed

## 2022-03-16 NOTE — PROGRESS NOTES
Pt dc'd to [facility], RN on days gave report to [facility], per report  IV removed  Script and summary sent with pt

## 2022-03-17 NOTE — DISCHARGE INSTRUCTIONS
Comfort Measures   WHAT YOU NEED TO KNOW:   What are comfort measures? Comfort measures are ways suffering can be eased during end-of-life care  Care can be provided at home or in a hospital  It can also be provided in a hospice or long-term care facility  Comfort measures are sometimes called palliative measures, but they are different  Palliative care can be given for any disease or illness, even if the person recovers  Comfort measures are only given when treatment will no longer be effective  Who provides comfort measures? Your comfort care team may include any of the following:  · Doctors, nurses, and dietitians    · Pain specialists and pharmacists    · Chaplains, social workers, and counselors    How will my symptoms be managed? · Pain  may be relieved with medicine  You may instead choose other methods, such as massage, music, or aromatherapy  Your team will ask how the pain is affecting your quality of life  You can describe where and how often you feel pain, and when it started  You can describe the pain as sharp, achy, or throbbing  You can also describe how bad it is  This may be on a pain scale from 0 to 10, or by choosing a face on a pain scale picture  · Shortness of breath  may be relieved with extra oxygen or breathing treatments  You can describe any problems with breathing  You may feel short of breath all the time, or only with activity  You may feel a little winded, or like you are struggling to breathe  Comfort care will depend on what is causing your shortness of breath  · Physical symptoms  can be relieved or treated to improve your comfort  Examples include nausea, fatigue, and sleep problems  Your skin can be treated or soothed if it is irritated or you develop a pressure injury from lying in bed  Some treatment may help relieve your symptoms even though it will not cure the disease  An example is radiation   Radiation will not cure the cancer, but it can make the tumor smaller  This may help relieve pain or other symptoms caused by the tumor  · Emotional symptoms  such as anxiety, sadness, or depression may affect your quality of life  These symptoms may be treated with medicines  Other methods include deep breathing and meditation  You may also want to talk to a counselor or   What support services may be offered? · Care support  is given to help you and your family  Your team can answer any questions you or family members have  You may have an advanced directive or other legal document that explains your wishes for care  Your care team will use this document if you are not able to tell them your wishes  You can also choose a person to make decisions for you  The person can be a family member or friend  If you are not able to make decisions, the person works with your team to provide the care you want  · Emotional and psychological support  is provided to help you and those close to you  Team members will meet with you and your family regularly about your condition  You and those close to you may also join support groups or meet others receiving comfort measures  · Spiritual and cultural support  helps your child and his or her family evaluate Shinto values and cultural beliefs  This may make it easier to understand and accept your child's condition  Where can I find more information? · [address] [facility])  Merged with [facility], [address] [facility] [address]  [facility] , [address] [place] [address]  Phone: [phone]  Web Address: [employer] com br    · [address] [facility] [address] and [address] [facility] [address] [facility], [address] [facility] [address] 100  [facility] , [address] [facility]  Phone: [phone]  Web Address: [email or web address]  [email or web address]    CARE AGREEMENT:   You have the right to help plan your care  Learn about your health condition and how it may be treated   Discuss treatment options with your healthcare providers to decide what care you want to receive  You always have the right to refuse treatment  The above information is an  only  It is not intended as medical advice for individual conditions or treatments  Talk to your doctor, nurse or pharmacist before following any medical regimen to see if it is safe and effective for you  © Copyright [employer] 2022 Information is for End User's use only and may not be sold, redistributed or otherwise used for commercial purposes   All illustrations and images included in CareNotes® are the copyrighted property of [employer]  or [address] [place] [address]

## 2022-03-17 NOTE — DISCHARGE INSTR - AVS FIRST PAGE
Dear Melinda Howard,     It was our pleasure to care for you here at [facility], [address]  It is our hope that we were always able to exceed the expected standards for your care during your stay  You were hospitalized due to altered mental status due to hypoglycemia  You were cared for on the [place] 2nd floor by Marck Danielle MD under the service of No att  providers found with the [facility] Hospitalist [facility] who covers for your primary care physician (PCP), Jasmin Levine MD, while you were hospitalized  If you have any questions or concerns related to this hospitalization, you may contact us at [phone]  For follow up as well as any medication refills, we recommend that you follow up with your primary care physician  A registered nurse will reach out to you by phone within a few days after your discharge to answer any additional questions that you may have after going home  However, at this time we provide for you here, the most important instructions / recommendations at discharge:     Notable Medication Adjustments -   Continuing morphine for pain management  Continuing Ativan for agitation management  Continue Robinul for secretions management  Continue DuoNeb nebulizer for respiratory comfort  Holding off on disease oriented care  Testing Required after Discharge -   None  Important follow up information -   Follow-up with rehab physician  Other Instructions -   None  Please review this entire after visit summary as additional general instructions including medication list, appointments, activity, diet, any pertinent wound care, and other additional recommendations from your care team that may be provided for you        Sincerely,     Marck Danielle MD

## 2022-03-18 NOTE — TELEPHONE ENCOUNTER
[address], NP and confirmed patient was discharged back to the facility as hospice patient and will no longer be doing IVIG treatments     Called Verónica Ramirez and confirmed above information as well  He is hopefull that the patient will get better over time and hopefully revisit the option of IVIG  I made him aware that if they wanted to pursue IVIG they may need to discuss this with the NP at the facility as I am not sure if that would cause a change in his level of care  Son verbalized understanding and is okay with canceling IVIG treatments at this time

## 2022-03-18 NOTE — TELEPHONE ENCOUNTER
Call received from facility RN, Ehsan Lewis  Wanted to notify office they will be cancelling IVIG infusion and wanted to cancel office visit as well as patient is now under hospice care  Appt cancelled

## 2022-03-21 NOTE — UTILIZATION REVIEW
Notification of Discharge   This is a Notification of Discharge from our facility [facility]  Please be advised that this patient has been discharge from our facility  Below you will find the admission and discharge date and time including the patients disposition  UTILIZATION REVIEW CONTACT:  Marlyn Gonzalez MA  Utilization   Network Utilization Review Department  Phone: [phone] x carefully listen to the prompts  All voicemails are confidential   Email: [email or web address]     PHYSICIAN ADVISORY SERVICES:  FOR LFIZ-BO-UMJA REVIEW - MEDICAL NECESSITY DENIAL  Phone: [phone]  Fax: [phone]  Email: [email or web address]     PRESENTATION DATE: 3/8/2022 12:14 PM  OBERVATION ADMISSION DATE:   INPATIENT ADMISSION DATE: 3/8/22  5:00 PM   DISCHARGE DATE: 3/16/2022 10:36 PM  DISPOSITION: Home with [address] with Hospice Care      IMPORTANT INFORMATION:  Send all requests for admission clinical reviews, approved or denied determinations and any other requests to dedicated fax number below belonging to the campus where the patient is receiving treatment   List of dedicated fax numbers:  [address] [place] [address] DENIALS (Administrative/Medical Necessity) [phone]   [address] [place] [address] (Maternity/NICU/Pediatrics) [phone]   [address] [place] [phone]   [address] [facility] [phone]   [address] [place] [address] [phone]   [address] [place] [address] [address] [place] [address],4Th Floor [address] [phone]   [facility]  [phone]   [address] [place] [address], [facility]  [address] [facility] [address] [facility] [phone]

## 2025-04-12 NOTE — CONSULTS
Consultation - Kenny Powers 81 y o  male MRN: [id number]    Unit/Bed#: S -01 Encounter: [id number]      Assessment/Plan     1  Altered mental status  2  Hypoglycemia  3  Hypothermia  4  Hypotension  Unclear etiology at this time  Random cortisol at 5:00 p m  21 8  Pancreas unremarkable on CT 03/2022  TSH just above normal, free T4 within normal limits-does not need further workup  Currently vitals are stable    -will check 8:00 a m  cortisol tomorrow  -check A1c  -will add on sulfonylurea panel to prior labs  Discussed with ICU team, will try to get more information regarding timing of glucagon and subsequent blood sugars  If blood sugars were checked appropriately after giving glucagon, there was an inappropriate response, it would go against hyperinsulinemic mediated hypoglycemia  - continue D10 to maintain euglycemia for now  If patient has hypoglycemia on while on the drip, please send labs for glucose, C-peptide, insulin, proinsulin, beta hydroxybutyrate  -will check insulin antibodies    5  Myasthenia gravis-care per neurology  6  CKD      Inpatient consult to Endocrinology  Consult performed by: Michaelle Ball MD  Consult ordered by: Mateo Fish PA-C          CC:  Hypoglycemia    History of Present Illness     HPI: Kenny Powers is a 81y o  year old male with past medical history of myasthenia gravis, hypertension, CAD, COPD, prostate cancer, SYED, nursing home resident who was brought to the ER yesterday in view of altered mental status, hypoglycemia  Patient is unable to provide any history  History obtained from chart review, ICU team   Noted to have low blood sugars in the 30s, was given glucagon  Unknown what triggered the repeat blood sugars and after how long these were checked  Apparently did not have a good response, remained hypoglycemic; was given D50 and was brought to the ER  Also noted to be hypothermic, hypotensive      History of myasthenia gravis, IVIG however has had difficulty in obtaining due to insurance reasons, last received in November 2021  In December was not feeling well, after which, patient was discharged to rehab/nursing home  [facility] by [facility] primary team   Appears that patient has been having a gradual decline in function, mentation over the last 1 week  He has had similar presentations when he has not been able to receive IVIG infusions  It is not known however if he has also been hypoglycemic at these times  Patient was given octreotide 50 mcg IV x1 on 03/08 at 3:24 p m  Initially started on D5 and then switched to D10 currently at 125 mL/hour  Blood sugars while on the drip have ranged  mg/dL  No history of diabetes mellitus  Patient has been agitated, currently in restraints  [employer] being used for warming        Review of Systems    Historical Information   Past Medical History:   Diagnosis Date    Cancer [facility])     Kidney disease     SYED (obstructive sleep apnea)     Prostate cancer ([facility] [address] )     Rotator cuff rupture 10/20/2006     History reviewed  No pertinent surgical history    Social History   Social History     Substance and Sexual Activity   Alcohol Use Never     Social History     Substance and Sexual Activity   Drug Use Never     Social History     Tobacco Use   Smoking Status Former Smoker    Types: Pipe   Smokeless Tobacco Former User     Family History:   Family History   Problem Relation Age of Onset    Diabetes Mother     Cancer Father        Meds/Allergies   Current Facility-Administered Medications   Medication Dose Route Frequency Provider Last Rate Last Admin    dextrose infusion 10 %  125 mL/hr Intravenous Continuous Domingo Gutierrez PA-C 125 mL/hr at 03/09/22 1457 125 mL/hr at 03/09/22 1457    heparin (porcine) subcutaneous injection 5,000 Units  5,000 Units Subcutaneous [facility] Raeann Rees MD   5,000 Units at 03/09/22 1351    immune globulin, human (GAMUNEX-C) 20 g 200 mL IV soln  333 mg/kg (Ideal) Intravenous Q24H Sara Kim MD   Rate Change at 03/09/22 0220    ipratropium (ATROVENT) 0 02 % inhalation solution 0 5 mg  0 5 mg Nebulization TID Gajose Avendano DO   0 5 mg at 03/09/22 1359    levalbuterol (Randall Row) inhalation solution 1 25 mg  1 25 mg Nebulization TID Sara Kim MD   1 25 mg at 03/09/22 1359    pantoprazole (PROTONIX) injection 40 mg  40 mg Intravenous Q12H [facility] Sara Kim MD   40 mg at 03/09/22 0809    sodium chloride (PF) 0 9 % injection 3 mL  3 mL Intravenous Q1H PRN Sara Kim MD        vancomycin [facility]) IVPB (premix in dextrose) 1,000 mg 200 mL  15 mg/kg Intravenous Once Sara Kim  mL/hr at 03/09/22 1610 1,000 mg at 03/09/22 1610     No Known Allergies    Objective   Vitals: Blood pressure 97/66, pulse 99, temperature 97 7 °F (36 5 °C), temperature source Bladder, resp  rate 22, height 5' 5" (1 651 m), weight 73 kg (160 lb 15 oz), SpO2 95 %  Intake/Output Summary (Last 24 hours) at 3/9/2022 1629  Last data filed at 3/9/2022 1200  Gross per 24 hour   Intake 1677 92 ml   Output 1100 ml   Net 577 92 ml     Invasive Devices  Report    Peripheral Intravenous Line            Peripheral IV 03/08/22 Left Forearm 1 day    Peripheral IV 03/08/22 Distal;Right;Upper;Ventral (anterior) Arm <1 day          Drain            Urethral Catheter Temperature probe 16 Fr  1 day                Physical Exam    Constitutional:confused, alert but not oriented  Appears well-developed;    HENT:   Head: Normocephalic and atraumatic  Neck: Normal range of motion  Pulmonary/Chest: Effort normal/ breathing on room air   Musculoskeletal: Normal range of motion  , gurgling +  Skin: Not diaphoretic         The history was obtained from the review of the chart, ICU team, RN     Lab Results:       Lab Results   Component Value Date    WBC 8 61 03/09/2022    HGB 8 9 (L) 03/09/2022    HCT 25 0 (L) 03/09/2022    MCV 95 03/09/2022     03/09/2022     Lab Results   Component Value Date/Time    BUN 36 (H) 03/09/2022 06:03 AM    K 3 9 03/09/2022 06:03 AM     (H) 03/09/2022 06:03 AM    CO2 20 (L) 03/09/2022 06:03 AM    CREATININE 1 70 (H) 03/09/2022 06:03 AM    AST 42 03/09/2022 06:03 AM    ALT 49 03/09/2022 06:03 AM    ALB 2 0 (L) 03/09/2022 06:03 AM     No results for input(s): CHOL, HDL, LDL, TRIG, VLDL in the last 72 hours  No results found for: Iman Raymundo  POC Glucose (mg/dl)   Date Value   03/09/2022 166 (H)   03/09/2022 108       Imaging Studies: I have personally reviewed pertinent reports  Portions of the record may have been created with voice recognition software  Occasional wrong word or "sound a like" substitutions may have occurred due to the inherent limitations of voice recognition software  Read the chart carefully and recognize, using context, where substitutions have occurred  ?Hx of Osteoporosis on Alendronate 70 weekly and Calcitriol 0.25 qd    Plan  - Hold Alendronate Hx of CKD. SCr on admission 0.94. On Kerendia.    Plan  - Trend SCr  - c/w Kerendia Hx of CKD. SCr on admission 0.94. On Kerendia.    Plan  - Trend SCr  - c/w Kerendia (if in formulary)